# Patient Record
Sex: FEMALE | Race: BLACK OR AFRICAN AMERICAN | NOT HISPANIC OR LATINO | URBAN - METROPOLITAN AREA
[De-identification: names, ages, dates, MRNs, and addresses within clinical notes are randomized per-mention and may not be internally consistent; named-entity substitution may affect disease eponyms.]

---

## 2018-01-01 ENCOUNTER — INPATIENT (INPATIENT)
Facility: HOSPITAL | Age: 73
LOS: 5 days | End: 2018-10-06
Attending: INTERNAL MEDICINE | Admitting: HOSPITALIST
Payer: MEDICARE

## 2018-01-01 VITALS
WEIGHT: 154.1 LBS | HEIGHT: 61 IN | HEART RATE: 134 BPM | TEMPERATURE: 102 F | DIASTOLIC BLOOD PRESSURE: 78 MMHG | OXYGEN SATURATION: 97 % | SYSTOLIC BLOOD PRESSURE: 141 MMHG | RESPIRATION RATE: 17 BRPM

## 2018-01-01 DIAGNOSIS — Z92.3 PERSONAL HISTORY OF IRRADIATION: ICD-10-CM

## 2018-01-01 DIAGNOSIS — R50.9 FEVER, UNSPECIFIED: ICD-10-CM

## 2018-01-01 DIAGNOSIS — Z87.891 PERSONAL HISTORY OF NICOTINE DEPENDENCE: ICD-10-CM

## 2018-01-01 DIAGNOSIS — N17.0 ACUTE KIDNEY FAILURE WITH TUBULAR NECROSIS: ICD-10-CM

## 2018-01-01 DIAGNOSIS — A39.4: ICD-10-CM

## 2018-01-01 DIAGNOSIS — D69.6 THROMBOCYTOPENIA, UNSPECIFIED: ICD-10-CM

## 2018-01-01 DIAGNOSIS — B17.9 ACUTE VIRAL HEPATITIS, UNSPECIFIED: ICD-10-CM

## 2018-01-01 DIAGNOSIS — Z85.3 PERSONAL HISTORY OF MALIGNANT NEOPLASM OF BREAST: ICD-10-CM

## 2018-01-01 DIAGNOSIS — M31.1 THROMBOTIC MICROANGIOPATHY: ICD-10-CM

## 2018-01-01 DIAGNOSIS — I10 ESSENTIAL (PRIMARY) HYPERTENSION: ICD-10-CM

## 2018-01-01 DIAGNOSIS — D72.823 LEUKEMOID REACTION: ICD-10-CM

## 2018-01-01 DIAGNOSIS — E11.9 TYPE 2 DIABETES MELLITUS WITHOUT COMPLICATIONS: ICD-10-CM

## 2018-01-01 DIAGNOSIS — K72.00 ACUTE AND SUBACUTE HEPATIC FAILURE WITHOUT COMA: ICD-10-CM

## 2018-01-01 DIAGNOSIS — A41.9 SEPSIS, UNSPECIFIED ORGANISM: ICD-10-CM

## 2018-01-01 DIAGNOSIS — Z98.890 OTHER SPECIFIED POSTPROCEDURAL STATES: Chronic | ICD-10-CM

## 2018-01-01 DIAGNOSIS — Z92.21 PERSONAL HISTORY OF ANTINEOPLASTIC CHEMOTHERAPY: ICD-10-CM

## 2018-01-01 DIAGNOSIS — D68.9 COAGULATION DEFECT, UNSPECIFIED: ICD-10-CM

## 2018-01-01 LAB
-  CANDIDA ALBICANS: SIGNIFICANT CHANGE UP
-  CANDIDA GLABRATA: SIGNIFICANT CHANGE UP
-  CANDIDA KRUSEI: SIGNIFICANT CHANGE UP
-  CANDIDA PARAPSILOSIS: SIGNIFICANT CHANGE UP
-  CANDIDA TROPICALIS: SIGNIFICANT CHANGE UP
-  CEFTRIAXONE: SIGNIFICANT CHANGE UP
-  COAGULASE NEGATIVE STAPHYLOCOCCUS: SIGNIFICANT CHANGE UP
-  K. PNEUMONIAE GROUP: SIGNIFICANT CHANGE UP
-  KPC RESISTANCE GENE: SIGNIFICANT CHANGE UP
-  PENICILLIN: SIGNIFICANT CHANGE UP
-  STREPTOCOCCUS SP. (NOT GRP A, B OR S PNEUMONIAE): SIGNIFICANT CHANGE UP
A BAUMANNII DNA SPEC QL NAA+PROBE: SIGNIFICANT CHANGE UP
ABO RH CONFIRMATION: SIGNIFICANT CHANGE UP
ADAMTS13 ACTIVITY: 51.9 % — LOW
ALBUMIN SERPL ELPH-MCNC: 1.5 G/DL — LOW (ref 3.3–5)
ALBUMIN SERPL ELPH-MCNC: 1.7 G/DL — LOW (ref 3.3–5)
ALBUMIN SERPL ELPH-MCNC: 2 G/DL — LOW (ref 3.3–5)
ALBUMIN SERPL ELPH-MCNC: 2.1 G/DL — LOW (ref 3.3–5)
ALBUMIN SERPL ELPH-MCNC: 2.2 G/DL — LOW (ref 3.3–5)
ALBUMIN SERPL ELPH-MCNC: 2.3 G/DL — LOW (ref 3.3–5)
ALBUMIN SERPL ELPH-MCNC: 2.3 G/DL — LOW (ref 3.3–5)
ALBUMIN SERPL ELPH-MCNC: 2.4 G/DL — LOW (ref 3.3–5)
ALBUMIN SERPL ELPH-MCNC: 4.1 G/DL — SIGNIFICANT CHANGE UP (ref 3.3–5)
ALP SERPL-CCNC: 120 U/L — SIGNIFICANT CHANGE UP (ref 40–120)
ALP SERPL-CCNC: 129 U/L — HIGH (ref 40–120)
ALP SERPL-CCNC: 132 U/L — HIGH (ref 40–120)
ALP SERPL-CCNC: 134 U/L — HIGH (ref 40–120)
ALP SERPL-CCNC: 241 U/L — HIGH (ref 40–120)
ALP SERPL-CCNC: 403 U/L — HIGH (ref 40–120)
ALP SERPL-CCNC: 459 U/L — HIGH (ref 40–120)
ALP SERPL-CCNC: 469 U/L — HIGH (ref 40–120)
ALP SERPL-CCNC: 58 U/L — SIGNIFICANT CHANGE UP (ref 40–120)
ALP SERPL-CCNC: 64 U/L — SIGNIFICANT CHANGE UP (ref 40–120)
ALP SERPL-CCNC: 67 U/L — SIGNIFICANT CHANGE UP (ref 40–120)
ALP SERPL-CCNC: 74 U/L — SIGNIFICANT CHANGE UP (ref 40–120)
ALP SERPL-CCNC: 74 U/L — SIGNIFICANT CHANGE UP (ref 40–120)
ALP SERPL-CCNC: 77 U/L — SIGNIFICANT CHANGE UP (ref 40–120)
ALT FLD-CCNC: 1109 U/L — HIGH (ref 12–78)
ALT FLD-CCNC: 1139 U/L — HIGH (ref 12–78)
ALT FLD-CCNC: 18 U/L — SIGNIFICANT CHANGE UP (ref 12–78)
ALT FLD-CCNC: 2529 U/L — HIGH (ref 12–78)
ALT FLD-CCNC: 2787 U/L — HIGH (ref 12–78)
ALT FLD-CCNC: 29 U/L — SIGNIFICANT CHANGE UP (ref 12–78)
ALT FLD-CCNC: 471 U/L — HIGH (ref 12–78)
ALT FLD-CCNC: 475 U/L — HIGH (ref 12–78)
ALT FLD-CCNC: 575 U/L — HIGH (ref 12–78)
ALT FLD-CCNC: 65 U/L — SIGNIFICANT CHANGE UP (ref 12–78)
ALT FLD-CCNC: 69 U/L — SIGNIFICANT CHANGE UP (ref 12–78)
ALT FLD-CCNC: 80 U/L — HIGH (ref 12–78)
ALT FLD-CCNC: 80 U/L — HIGH (ref 12–78)
ALT FLD-CCNC: 906 U/L — HIGH (ref 12–78)
AMMONIA BLD-MCNC: 44 UMOL/L — HIGH (ref 11–32)
AMMONIA BLD-MCNC: 77 UMOL/L — HIGH (ref 11–32)
AMPHET UR-MCNC: NEGATIVE — SIGNIFICANT CHANGE UP
AMYLASE P1 CFR SERPL: 231 U/L — HIGH (ref 25–115)
ANION GAP SERPL CALC-SCNC: 12 MMOL/L — SIGNIFICANT CHANGE UP (ref 5–17)
ANION GAP SERPL CALC-SCNC: 15 MMOL/L — SIGNIFICANT CHANGE UP (ref 5–17)
ANION GAP SERPL CALC-SCNC: 17 MMOL/L — SIGNIFICANT CHANGE UP (ref 5–17)
ANION GAP SERPL CALC-SCNC: 18 MMOL/L — HIGH (ref 5–17)
ANION GAP SERPL CALC-SCNC: 20 MMOL/L — HIGH (ref 5–17)
ANION GAP SERPL CALC-SCNC: 21 MMOL/L — HIGH (ref 5–17)
ANION GAP SERPL CALC-SCNC: 23 MMOL/L — HIGH (ref 5–17)
ANION GAP SERPL CALC-SCNC: 25 MMOL/L — HIGH (ref 5–17)
ANION GAP SERPL CALC-SCNC: 27 MMOL/L — HIGH (ref 5–17)
ANION GAP SERPL CALC-SCNC: 29 MMOL/L — HIGH (ref 5–17)
ANISOCYTOSIS BLD QL: SLIGHT — SIGNIFICANT CHANGE UP
APAP SERPL-MCNC: 10 UG/ML — SIGNIFICANT CHANGE UP (ref 10–30)
APPEARANCE UR: CLEAR — SIGNIFICANT CHANGE UP
APTT BLD: 187.2 SEC — CRITICAL HIGH (ref 27.5–37.4)
APTT BLD: 23 SEC — LOW (ref 27.5–37.4)
APTT BLD: 24.9 SEC — LOW (ref 27.5–37.4)
APTT BLD: 37.4 SEC — SIGNIFICANT CHANGE UP (ref 27.5–37.4)
APTT BLD: 38.1 SEC — HIGH (ref 27.5–37.4)
APTT BLD: 40.3 SEC — HIGH (ref 27.5–37.4)
APTT BLD: 40.7 SEC — HIGH (ref 27.5–37.4)
APTT BLD: 44.1 SEC — HIGH (ref 27.5–37.4)
AST SERPL-CCNC: 1107 U/L — HIGH (ref 15–37)
AST SERPL-CCNC: 114 U/L — HIGH (ref 15–37)
AST SERPL-CCNC: 17 U/L — SIGNIFICANT CHANGE UP (ref 15–37)
AST SERPL-CCNC: 2244 U/L — HIGH (ref 15–37)
AST SERPL-CCNC: 2910 U/L — HIGH (ref 15–37)
AST SERPL-CCNC: 5685 U/L — HIGH (ref 15–37)
AST SERPL-CCNC: 62 U/L — HIGH (ref 15–37)
AST SERPL-CCNC: 802 U/L — HIGH (ref 15–37)
AST SERPL-CCNC: 809 U/L — HIGH (ref 15–37)
AST SERPL-CCNC: 8267 U/L — HIGH (ref 15–37)
AST SERPL-CCNC: 85 U/L — HIGH (ref 15–37)
AST SERPL-CCNC: 85 U/L — HIGH (ref 15–37)
AST SERPL-CCNC: 88 U/L — HIGH (ref 15–37)
AST SERPL-CCNC: 8823 U/L — HIGH (ref 15–37)
BACTERIA # UR AUTO: ABNORMAL
BARBITURATES UR SCN-MCNC: NEGATIVE — SIGNIFICANT CHANGE UP
BASE EXCESS BLDA CALC-SCNC: -10.1 MMOL/L — LOW (ref -2–2)
BASE EXCESS BLDA CALC-SCNC: -10.5 MMOL/L — LOW (ref -2–2)
BASE EXCESS BLDA CALC-SCNC: -10.8 MMOL/L — LOW (ref -2–2)
BASE EXCESS BLDA CALC-SCNC: -11 MMOL/L — LOW (ref -2–2)
BASE EXCESS BLDA CALC-SCNC: -11.1 MMOL/L — LOW (ref -2–2)
BASE EXCESS BLDA CALC-SCNC: -13.4 MMOL/L — LOW (ref -2–2)
BASE EXCESS BLDA CALC-SCNC: -16.5 MMOL/L — LOW (ref -2–2)
BASE EXCESS BLDA CALC-SCNC: -17 MMOL/L — LOW (ref -2–2)
BASE EXCESS BLDA CALC-SCNC: -18 MMOL/L — LOW (ref -2–2)
BASE EXCESS BLDA CALC-SCNC: -21 MMOL/L — LOW (ref -2–2)
BASE EXCESS BLDA CALC-SCNC: -9.4 MMOL/L — LOW (ref -2–2)
BASE EXCESS BLDA CALC-SCNC: -9.6 MMOL/L — LOW (ref -2–2)
BASE EXCESS BLDV CALC-SCNC: -11.7 MMOL/L — LOW (ref -2–2)
BASE EXCESS BLDV CALC-SCNC: -9.6 MMOL/L — LOW (ref -2–2)
BASOPHILS # BLD AUTO: 0 K/UL — SIGNIFICANT CHANGE UP (ref 0–0.2)
BASOPHILS # BLD AUTO: 0.01 K/UL — SIGNIFICANT CHANGE UP (ref 0–0.2)
BASOPHILS # BLD AUTO: 0.11 K/UL — SIGNIFICANT CHANGE UP (ref 0–0.2)
BASOPHILS NFR BLD AUTO: 0 % — SIGNIFICANT CHANGE UP (ref 0–2)
BASOPHILS NFR BLD AUTO: 0.1 % — SIGNIFICANT CHANGE UP (ref 0–2)
BASOPHILS NFR BLD AUTO: 0.4 % — SIGNIFICANT CHANGE UP (ref 0–2)
BENZODIAZ UR-MCNC: NEGATIVE — SIGNIFICANT CHANGE UP
BILIRUB DIRECT SERPL-MCNC: 0.59 MG/DL — HIGH (ref 0.05–0.2)
BILIRUB DIRECT SERPL-MCNC: 0.77 MG/DL — HIGH (ref 0.05–0.2)
BILIRUB DIRECT SERPL-MCNC: 1.31 MG/DL — HIGH (ref 0.05–0.2)
BILIRUB DIRECT SERPL-MCNC: 1.35 MG/DL — HIGH (ref 0.05–0.2)
BILIRUB DIRECT SERPL-MCNC: 1.36 MG/DL — HIGH (ref 0.05–0.2)
BILIRUB DIRECT SERPL-MCNC: 5.49 MG/DL — HIGH (ref 0.05–0.2)
BILIRUB DIRECT SERPL-MCNC: 5.56 MG/DL — HIGH (ref 0.05–0.2)
BILIRUB DIRECT SERPL-MCNC: 6.61 MG/DL — HIGH (ref 0.05–0.2)
BILIRUB INDIRECT FLD-MCNC: 0.1 MG/DL — LOW (ref 0.2–1)
BILIRUB INDIRECT FLD-MCNC: 0.2 MG/DL — SIGNIFICANT CHANGE UP (ref 0.2–1)
BILIRUB INDIRECT FLD-MCNC: 0.3 MG/DL — SIGNIFICANT CHANGE UP (ref 0.2–1)
BILIRUB INDIRECT FLD-MCNC: 0.4 MG/DL — SIGNIFICANT CHANGE UP (ref 0.2–1)
BILIRUB INDIRECT FLD-MCNC: 1 MG/DL — SIGNIFICANT CHANGE UP (ref 0.2–1)
BILIRUB INDIRECT FLD-MCNC: 1.3 MG/DL — HIGH (ref 0.2–1)
BILIRUB SERPL-MCNC: 0.5 MG/DL — SIGNIFICANT CHANGE UP (ref 0.2–1.2)
BILIRUB SERPL-MCNC: 0.5 MG/DL — SIGNIFICANT CHANGE UP (ref 0.2–1.2)
BILIRUB SERPL-MCNC: 0.8 MG/DL — SIGNIFICANT CHANGE UP (ref 0.2–1.2)
BILIRUB SERPL-MCNC: 1.1 MG/DL — SIGNIFICANT CHANGE UP (ref 0.2–1.2)
BILIRUB SERPL-MCNC: 1.1 MG/DL — SIGNIFICANT CHANGE UP (ref 0.2–1.2)
BILIRUB SERPL-MCNC: 1.2 MG/DL — SIGNIFICANT CHANGE UP (ref 0.2–1.2)
BILIRUB SERPL-MCNC: 1.6 MG/DL — HIGH (ref 0.2–1.2)
BILIRUB SERPL-MCNC: 1.7 MG/DL — HIGH (ref 0.2–1.2)
BILIRUB SERPL-MCNC: 1.7 MG/DL — HIGH (ref 0.2–1.2)
BILIRUB SERPL-MCNC: 2.2 MG/DL — HIGH (ref 0.2–1.2)
BILIRUB SERPL-MCNC: 4.3 MG/DL — HIGH (ref 0.2–1.2)
BILIRUB SERPL-MCNC: 5.7 MG/DL — HIGH (ref 0.2–1.2)
BILIRUB SERPL-MCNC: 6.8 MG/DL — HIGH (ref 0.2–1.2)
BILIRUB SERPL-MCNC: 7.6 MG/DL — HIGH (ref 0.2–1.2)
BILIRUB UR-MCNC: NEGATIVE — SIGNIFICANT CHANGE UP
BLD GP AB SCN SERPL QL: SIGNIFICANT CHANGE UP
BLD GP AB SCN SERPL QL: SIGNIFICANT CHANGE UP
BLOOD GAS COMMENTS, VENOUS: SIGNIFICANT CHANGE UP
BLOOD GAS COMMENTS: SIGNIFICANT CHANGE UP
BLOOD GAS SOURCE: SIGNIFICANT CHANGE UP
BUN SERPL-MCNC: 11 MG/DL — SIGNIFICANT CHANGE UP (ref 7–23)
BUN SERPL-MCNC: 14 MG/DL — SIGNIFICANT CHANGE UP (ref 7–23)
BUN SERPL-MCNC: 14 MG/DL — SIGNIFICANT CHANGE UP (ref 7–23)
BUN SERPL-MCNC: 20 MG/DL — SIGNIFICANT CHANGE UP (ref 7–23)
BUN SERPL-MCNC: 22 MG/DL — SIGNIFICANT CHANGE UP (ref 7–23)
BUN SERPL-MCNC: 22 MG/DL — SIGNIFICANT CHANGE UP (ref 7–23)
BUN SERPL-MCNC: 23 MG/DL — SIGNIFICANT CHANGE UP (ref 7–23)
BUN SERPL-MCNC: 24 MG/DL — HIGH (ref 7–23)
BUN SERPL-MCNC: 30 MG/DL — HIGH (ref 7–23)
BUN SERPL-MCNC: 35 MG/DL — HIGH (ref 7–23)
BUN SERPL-MCNC: 38 MG/DL — HIGH (ref 7–23)
BUN SERPL-MCNC: 38 MG/DL — HIGH (ref 7–23)
BUN SERPL-MCNC: 39 MG/DL — HIGH (ref 7–23)
BUN SERPL-MCNC: 42 MG/DL — HIGH (ref 7–23)
C3 SERPL-MCNC: 80 MG/DL — LOW (ref 81–157)
C4 SERPL-MCNC: 17 MG/DL — SIGNIFICANT CHANGE UP (ref 13–39)
CA-I BLD-SCNC: 1.12 MMOL/L — SIGNIFICANT CHANGE UP (ref 1.12–1.3)
CALCIUM SERPL-MCNC: 6.3 MG/DL — CRITICAL LOW (ref 8.5–10.1)
CALCIUM SERPL-MCNC: 6.5 MG/DL — CRITICAL LOW (ref 8.5–10.1)
CALCIUM SERPL-MCNC: 6.5 MG/DL — CRITICAL LOW (ref 8.5–10.1)
CALCIUM SERPL-MCNC: 6.6 MG/DL — LOW (ref 8.5–10.1)
CALCIUM SERPL-MCNC: 6.7 MG/DL — LOW (ref 8.5–10.1)
CALCIUM SERPL-MCNC: 6.8 MG/DL — LOW (ref 8.5–10.1)
CALCIUM SERPL-MCNC: 6.8 MG/DL — LOW (ref 8.5–10.1)
CALCIUM SERPL-MCNC: 6.9 MG/DL — LOW (ref 8.5–10.1)
CALCIUM SERPL-MCNC: 7 MG/DL — LOW (ref 8.5–10.1)
CALCIUM SERPL-MCNC: 7 MG/DL — LOW (ref 8.5–10.1)
CALCIUM SERPL-MCNC: 7.6 MG/DL — LOW (ref 8.5–10.1)
CALCIUM SERPL-MCNC: 7.6 MG/DL — LOW (ref 8.5–10.1)
CALCIUM SERPL-MCNC: 8.1 MG/DL — LOW (ref 8.5–10.1)
CALCIUM SERPL-MCNC: 8.9 MG/DL — SIGNIFICANT CHANGE UP (ref 8.5–10.1)
CHLORIDE SERPL-SCNC: 100 MMOL/L — SIGNIFICANT CHANGE UP (ref 96–108)
CHLORIDE SERPL-SCNC: 102 MMOL/L — SIGNIFICANT CHANGE UP (ref 96–108)
CHLORIDE SERPL-SCNC: 104 MMOL/L — SIGNIFICANT CHANGE UP (ref 96–108)
CHLORIDE SERPL-SCNC: 105 MMOL/L — SIGNIFICANT CHANGE UP (ref 96–108)
CHLORIDE SERPL-SCNC: 108 MMOL/L — SIGNIFICANT CHANGE UP (ref 96–108)
CHLORIDE SERPL-SCNC: 109 MMOL/L — HIGH (ref 96–108)
CHLORIDE SERPL-SCNC: 110 MMOL/L — HIGH (ref 96–108)
CHLORIDE SERPL-SCNC: 111 MMOL/L — HIGH (ref 96–108)
CHLORIDE SERPL-SCNC: 111 MMOL/L — HIGH (ref 96–108)
CHLORIDE SERPL-SCNC: 112 MMOL/L — HIGH (ref 96–108)
CHLORIDE SERPL-SCNC: 113 MMOL/L — HIGH (ref 96–108)
CHLORIDE SERPL-SCNC: 113 MMOL/L — HIGH (ref 96–108)
CHLORIDE SERPL-SCNC: 116 MMOL/L — HIGH (ref 96–108)
CHLORIDE SERPL-SCNC: 93 MMOL/L — LOW (ref 96–108)
CK MB BLD-MCNC: 0.5 % — SIGNIFICANT CHANGE UP (ref 0–3.5)
CK MB BLD-MCNC: 1 % — SIGNIFICANT CHANGE UP (ref 0–3.5)
CK MB BLD-MCNC: 2 % — SIGNIFICANT CHANGE UP (ref 0–3.5)
CK MB BLD-MCNC: 2.8 % — SIGNIFICANT CHANGE UP (ref 0–3.5)
CK MB BLD-MCNC: <1.1 % — SIGNIFICANT CHANGE UP (ref 0–3.5)
CK MB CFR SERPL CALC: 1.5 NG/ML — SIGNIFICANT CHANGE UP (ref 0.5–3.6)
CK MB CFR SERPL CALC: 26.8 NG/ML — HIGH (ref 0.5–3.6)
CK MB CFR SERPL CALC: 4.8 NG/ML — HIGH (ref 0.5–3.6)
CK MB CFR SERPL CALC: 53.3 NG/ML — HIGH (ref 0.5–3.6)
CK MB CFR SERPL CALC: <1 NG/ML — SIGNIFICANT CHANGE UP (ref 0.5–3.6)
CK SERPL-CCNC: 149 U/L — SIGNIFICANT CHANGE UP (ref 26–192)
CK SERPL-CCNC: 1831 U/L — HIGH (ref 26–192)
CK SERPL-CCNC: 245 U/L — HIGH (ref 26–192)
CK SERPL-CCNC: 479 U/L — HIGH (ref 26–192)
CK SERPL-CCNC: 90 U/L — SIGNIFICANT CHANGE UP (ref 26–192)
CK SERPL-CCNC: 953 U/L — HIGH (ref 26–192)
CK SERPL-CCNC: CRITICAL HIGH U/L (ref 26–192)
CO2 SERPL-SCNC: 10 MMOL/L — CRITICAL LOW (ref 22–31)
CO2 SERPL-SCNC: 11 MMOL/L — LOW (ref 22–31)
CO2 SERPL-SCNC: 14 MMOL/L — LOW (ref 22–31)
CO2 SERPL-SCNC: 14 MMOL/L — LOW (ref 22–31)
CO2 SERPL-SCNC: 15 MMOL/L — LOW (ref 22–31)
CO2 SERPL-SCNC: 16 MMOL/L — LOW (ref 22–31)
CO2 SERPL-SCNC: 17 MMOL/L — LOW (ref 22–31)
CO2 SERPL-SCNC: 18 MMOL/L — LOW (ref 22–31)
CO2 SERPL-SCNC: 25 MMOL/L — SIGNIFICANT CHANGE UP (ref 22–31)
CO2 SERPL-SCNC: 8 MMOL/L — CRITICAL LOW (ref 22–31)
COCAINE METAB.OTHER UR-MCNC: NEGATIVE — SIGNIFICANT CHANGE UP
COLOR SPEC: YELLOW — SIGNIFICANT CHANGE UP
COMMENT - URINE: SIGNIFICANT CHANGE UP
CREAT ?TM UR-MCNC: 78 MG/DL — SIGNIFICANT CHANGE UP
CREAT SERPL-MCNC: 1 MG/DL — SIGNIFICANT CHANGE UP (ref 0.5–1.3)
CREAT SERPL-MCNC: 1.31 MG/DL — HIGH (ref 0.5–1.3)
CREAT SERPL-MCNC: 1.54 MG/DL — HIGH (ref 0.5–1.3)
CREAT SERPL-MCNC: 1.68 MG/DL — HIGH (ref 0.5–1.3)
CREAT SERPL-MCNC: 1.76 MG/DL — HIGH (ref 0.5–1.3)
CREAT SERPL-MCNC: 1.88 MG/DL — HIGH (ref 0.5–1.3)
CREAT SERPL-MCNC: 2.02 MG/DL — HIGH (ref 0.5–1.3)
CREAT SERPL-MCNC: 2.19 MG/DL — HIGH (ref 0.5–1.3)
CREAT SERPL-MCNC: 2.36 MG/DL — HIGH (ref 0.5–1.3)
CREAT SERPL-MCNC: 3.73 MG/DL — HIGH (ref 0.5–1.3)
CREAT SERPL-MCNC: 3.73 MG/DL — HIGH (ref 0.5–1.3)
CREAT SERPL-MCNC: 3.82 MG/DL — HIGH (ref 0.5–1.3)
CREAT SERPL-MCNC: 4.06 MG/DL — HIGH (ref 0.5–1.3)
CREAT SERPL-MCNC: 4.78 MG/DL — HIGH (ref 0.5–1.3)
CRP SERPL-MCNC: 31.55 MG/DL — HIGH (ref 0–0.4)
CRP SERPL-MCNC: 35.47 MG/DL — HIGH (ref 0–0.4)
CULTURE RESULTS: NO GROWTH — SIGNIFICANT CHANGE UP
CULTURE RESULTS: SIGNIFICANT CHANGE UP
D DIMER BLD IA.RAPID-MCNC: 7543 NG/ML DDU — HIGH
DIFF PNL FLD: ABNORMAL
E CLOAC COMP DNA BLD POS QL NAA+PROBE: SIGNIFICANT CHANGE UP
E COLI DNA BLD POS QL NAA+NON-PROBE: SIGNIFICANT CHANGE UP
ELLIPTOCYTES BLD QL SMEAR: SLIGHT — SIGNIFICANT CHANGE UP
ENTEROCOC DNA BLD POS QL NAA+NON-PROBE: SIGNIFICANT CHANGE UP
ENTEROCOC DNA BLD POS QL NAA+NON-PROBE: SIGNIFICANT CHANGE UP
EOSINOPHIL # BLD AUTO: 0 K/UL — SIGNIFICANT CHANGE UP (ref 0–0.5)
EOSINOPHIL # BLD AUTO: 0.01 K/UL — SIGNIFICANT CHANGE UP (ref 0–0.5)
EOSINOPHIL # BLD AUTO: 0.01 K/UL — SIGNIFICANT CHANGE UP (ref 0–0.5)
EOSINOPHIL NFR BLD AUTO: 0 % — SIGNIFICANT CHANGE UP (ref 0–6)
EOSINOPHIL NFR BLD AUTO: 0.1 % — SIGNIFICANT CHANGE UP (ref 0–6)
EPI CELLS # UR: SIGNIFICANT CHANGE UP
ERYTHROCYTE [SEDIMENTATION RATE] IN BLOOD: 23 MM/HR — HIGH (ref 0–20)
ERYTHROCYTE [SEDIMENTATION RATE] IN BLOOD: 9 MM/HR — SIGNIFICANT CHANGE UP (ref 0–20)
ETHANOL SERPL-MCNC: <10 MG/DL — SIGNIFICANT CHANGE UP (ref 0–10)
FERRITIN SERPL-MCNC: 2539 NG/ML — HIGH (ref 15–150)
FERRITIN SERPL-MCNC: 285 NG/ML — HIGH (ref 15–150)
FERRITIN SERPL-MCNC: 3478 NG/ML — HIGH (ref 15–150)
FIBRINOGEN PPP-MCNC: 255 MG/DL — LOW (ref 310–510)
FIBRINOGEN PPP-MCNC: 294 MG/DL — LOW (ref 310–510)
FIBRINOGEN PPP-MCNC: 294 MG/DL — LOW (ref 310–510)
FIBRINOGEN PPP-MCNC: 524 MG/DL — HIGH (ref 310–510)
FIBRINOGEN PPP-MCNC: 691 MG/DL — HIGH (ref 310–510)
FLUAV SPEC QL CULT: NEGATIVE — SIGNIFICANT CHANGE UP
FLUAV SPEC QL CULT: NEGATIVE — SIGNIFICANT CHANGE UP
FLUBV AG SPEC QL IA: NEGATIVE — SIGNIFICANT CHANGE UP
FLUBV AG SPEC QL IA: NEGATIVE — SIGNIFICANT CHANGE UP
GAS PNL BLDV: SIGNIFICANT CHANGE UP
GIANT PLATELETS BLD QL SMEAR: PRESENT — SIGNIFICANT CHANGE UP
GLUCOSE BLDC GLUCOMTR-MCNC: 106 MG/DL — HIGH (ref 70–99)
GLUCOSE BLDC GLUCOMTR-MCNC: 144 MG/DL — HIGH (ref 70–99)
GLUCOSE BLDC GLUCOMTR-MCNC: 152 MG/DL — HIGH (ref 70–99)
GLUCOSE BLDC GLUCOMTR-MCNC: 166 MG/DL — HIGH (ref 70–99)
GLUCOSE BLDC GLUCOMTR-MCNC: 168 MG/DL — HIGH (ref 70–99)
GLUCOSE BLDC GLUCOMTR-MCNC: 170 MG/DL — HIGH (ref 70–99)
GLUCOSE BLDC GLUCOMTR-MCNC: 180 MG/DL — HIGH (ref 70–99)
GLUCOSE BLDC GLUCOMTR-MCNC: 181 MG/DL — HIGH (ref 70–99)
GLUCOSE BLDC GLUCOMTR-MCNC: 188 MG/DL — HIGH (ref 70–99)
GLUCOSE BLDC GLUCOMTR-MCNC: 190 MG/DL — HIGH (ref 70–99)
GLUCOSE BLDC GLUCOMTR-MCNC: 207 MG/DL — HIGH (ref 70–99)
GLUCOSE BLDC GLUCOMTR-MCNC: 246 MG/DL — HIGH (ref 70–99)
GLUCOSE BLDC GLUCOMTR-MCNC: 252 MG/DL — HIGH (ref 70–99)
GLUCOSE BLDC GLUCOMTR-MCNC: 262 MG/DL — HIGH (ref 70–99)
GLUCOSE BLDC GLUCOMTR-MCNC: 269 MG/DL — HIGH (ref 70–99)
GLUCOSE BLDC GLUCOMTR-MCNC: 274 MG/DL — HIGH (ref 70–99)
GLUCOSE BLDC GLUCOMTR-MCNC: 278 MG/DL — HIGH (ref 70–99)
GLUCOSE BLDC GLUCOMTR-MCNC: 28 MG/DL — CRITICAL LOW (ref 70–99)
GLUCOSE BLDC GLUCOMTR-MCNC: 288 MG/DL — HIGH (ref 70–99)
GLUCOSE BLDC GLUCOMTR-MCNC: 290 MG/DL — HIGH (ref 70–99)
GLUCOSE BLDC GLUCOMTR-MCNC: 297 MG/DL — HIGH (ref 70–99)
GLUCOSE BLDC GLUCOMTR-MCNC: 329 MG/DL — HIGH (ref 70–99)
GLUCOSE BLDC GLUCOMTR-MCNC: 334 MG/DL — HIGH (ref 70–99)
GLUCOSE BLDC GLUCOMTR-MCNC: 423 MG/DL — HIGH (ref 70–99)
GLUCOSE BLDC GLUCOMTR-MCNC: 53 MG/DL — LOW (ref 70–99)
GLUCOSE BLDC GLUCOMTR-MCNC: 57 MG/DL — LOW (ref 70–99)
GLUCOSE SERPL-MCNC: 134 MG/DL — HIGH (ref 70–99)
GLUCOSE SERPL-MCNC: 150 MG/DL — HIGH (ref 70–99)
GLUCOSE SERPL-MCNC: 151 MG/DL — HIGH (ref 70–99)
GLUCOSE SERPL-MCNC: 172 MG/DL — HIGH (ref 70–99)
GLUCOSE SERPL-MCNC: 186 MG/DL — HIGH (ref 70–99)
GLUCOSE SERPL-MCNC: 223 MG/DL — HIGH (ref 70–99)
GLUCOSE SERPL-MCNC: 223 MG/DL — HIGH (ref 70–99)
GLUCOSE SERPL-MCNC: 226 MG/DL — HIGH (ref 70–99)
GLUCOSE SERPL-MCNC: 229 MG/DL — HIGH (ref 70–99)
GLUCOSE SERPL-MCNC: 247 MG/DL — HIGH (ref 70–99)
GLUCOSE SERPL-MCNC: 252 MG/DL — HIGH (ref 70–99)
GLUCOSE SERPL-MCNC: 284 MG/DL — HIGH (ref 70–99)
GLUCOSE SERPL-MCNC: 314 MG/DL — HIGH (ref 70–99)
GLUCOSE SERPL-MCNC: 691 MG/DL — CRITICAL HIGH (ref 70–99)
GLUCOSE UR QL: NEGATIVE MG/DL — SIGNIFICANT CHANGE UP
GP B STREP DNA BLD POS QL NAA+NON-PROBE: SIGNIFICANT CHANGE UP
GRAM STN FLD: SIGNIFICANT CHANGE UP
GRAN CASTS # UR COMP ASSIST: ABNORMAL /LPF
HAEM INFLU DNA BLD POS QL NAA+NON-PROBE: SIGNIFICANT CHANGE UP
HAPTOGLOB SERPL-MCNC: 95 MG/DL — SIGNIFICANT CHANGE UP (ref 34–200)
HAV IGM SER-ACNC: SIGNIFICANT CHANGE UP
HAV IGM SER-ACNC: SIGNIFICANT CHANGE UP
HBA1C BLD-MCNC: 6.9 % — HIGH (ref 4–5.6)
HBV CORE IGM SER-ACNC: SIGNIFICANT CHANGE UP
HBV CORE IGM SER-ACNC: SIGNIFICANT CHANGE UP
HBV SURFACE AB SER-ACNC: 13.1 MIU/ML — SIGNIFICANT CHANGE UP
HBV SURFACE AB SER-ACNC: REACTIVE
HBV SURFACE AG SER-ACNC: SIGNIFICANT CHANGE UP
HCO3 BLDA-SCNC: 10 MMOL/L — LOW (ref 21–29)
HCO3 BLDA-SCNC: 11 MMOL/L — LOW (ref 21–29)
HCO3 BLDA-SCNC: 14 MMOL/L — LOW (ref 21–29)
HCO3 BLDA-SCNC: 14 MMOL/L — LOW (ref 21–29)
HCO3 BLDA-SCNC: 15 MMOL/L — LOW (ref 21–29)
HCO3 BLDA-SCNC: 16 MMOL/L — LOW (ref 21–29)
HCO3 BLDA-SCNC: 17 MMOL/L — LOW (ref 21–29)
HCO3 BLDA-SCNC: 8 MMOL/L — LOW (ref 21–29)
HCO3 BLDV-SCNC: 16 MMOL/L — LOW (ref 21–29)
HCO3 BLDV-SCNC: 16 MMOL/L — LOW (ref 21–29)
HCT VFR BLD CALC: 15.9 % — CRITICAL LOW (ref 34.5–45)
HCT VFR BLD CALC: 21.3 % — LOW (ref 34.5–45)
HCT VFR BLD CALC: 23.6 % — LOW (ref 34.5–45)
HCT VFR BLD CALC: 29.2 % — LOW (ref 34.5–45)
HCT VFR BLD CALC: 29.4 % — LOW (ref 34.5–45)
HCT VFR BLD CALC: 30.5 % — LOW (ref 34.5–45)
HCT VFR BLD CALC: 32.3 % — LOW (ref 34.5–45)
HCT VFR BLD CALC: 33 % — LOW (ref 34.5–45)
HCT VFR BLD CALC: 33.3 % — LOW (ref 34.5–45)
HCT VFR BLD CALC: 37.8 % — SIGNIFICANT CHANGE UP (ref 34.5–45)
HCT VFR BLD CALC: 40.9 % — SIGNIFICANT CHANGE UP (ref 34.5–45)
HCV AB S/CO SERPL IA: 0.08 S/CO — SIGNIFICANT CHANGE UP
HCV AB S/CO SERPL IA: 0.08 S/CO — SIGNIFICANT CHANGE UP
HCV AB S/CO SERPL IA: 0.09 S/CO — SIGNIFICANT CHANGE UP
HCV AB SERPL-IMP: SIGNIFICANT CHANGE UP
HGB BLD-MCNC: 10 G/DL — LOW (ref 11.5–15.5)
HGB BLD-MCNC: 10.7 G/DL — LOW (ref 11.5–15.5)
HGB BLD-MCNC: 10.8 G/DL — LOW (ref 11.5–15.5)
HGB BLD-MCNC: 11.3 G/DL — LOW (ref 11.5–15.5)
HGB BLD-MCNC: 12.8 G/DL — SIGNIFICANT CHANGE UP (ref 11.5–15.5)
HGB BLD-MCNC: 4.8 G/DL — CRITICAL LOW (ref 11.5–15.5)
HGB BLD-MCNC: 6.6 G/DL — CRITICAL LOW (ref 11.5–15.5)
HGB BLD-MCNC: 7.5 G/DL — LOW (ref 11.5–15.5)
HGB BLD-MCNC: 8.6 G/DL — LOW (ref 11.5–15.5)
HGB BLD-MCNC: 8.9 G/DL — LOW (ref 11.5–15.5)
HGB BLD-MCNC: 9.6 G/DL — LOW (ref 11.5–15.5)
HOROWITZ INDEX BLDA+IHG-RTO: 0.35 — SIGNIFICANT CHANGE UP
HOROWITZ INDEX BLDA+IHG-RTO: 0.5 — SIGNIFICANT CHANGE UP
HOROWITZ INDEX BLDA+IHG-RTO: 21 — SIGNIFICANT CHANGE UP
HOROWITZ INDEX BLDA+IHG-RTO: 30 — SIGNIFICANT CHANGE UP
HOROWITZ INDEX BLDA+IHG-RTO: 30 — SIGNIFICANT CHANGE UP
HOROWITZ INDEX BLDA+IHG-RTO: 35 — SIGNIFICANT CHANGE UP
HOROWITZ INDEX BLDA+IHG-RTO: 40 — SIGNIFICANT CHANGE UP
HOROWITZ INDEX BLDA+IHG-RTO: 40 — SIGNIFICANT CHANGE UP
HOROWITZ INDEX BLDA+IHG-RTO: 45 — SIGNIFICANT CHANGE UP
HOROWITZ INDEX BLDV+IHG-RTO: 100 — SIGNIFICANT CHANGE UP
HOROWITZ INDEX BLDV+IHG-RTO: 28 — SIGNIFICANT CHANGE UP
HOROWITZ INDEX BLDV+IHG-RTO: 28 — SIGNIFICANT CHANGE UP
IMM GRANULOCYTES NFR BLD AUTO: 0.2 % — SIGNIFICANT CHANGE UP (ref 0–1.5)
IMM GRANULOCYTES NFR BLD AUTO: 1.1 % — SIGNIFICANT CHANGE UP (ref 0–1.5)
IMM GRANULOCYTES NFR BLD AUTO: 8.8 % — HIGH (ref 0–1.5)
INR BLD: 1.03 RATIO — SIGNIFICANT CHANGE UP (ref 0.88–1.16)
INR BLD: 1.55 RATIO — HIGH (ref 0.88–1.16)
INR BLD: 1.7 RATIO — HIGH (ref 0.88–1.16)
INR BLD: 1.8 RATIO — HIGH (ref 0.88–1.16)
INR BLD: 2.08 RATIO — HIGH (ref 0.88–1.16)
INR BLD: 2.33 RATIO — HIGH (ref 0.88–1.16)
INR BLD: 2.48 RATIO — HIGH (ref 0.88–1.16)
INR BLD: 3.22 RATIO — HIGH (ref 0.88–1.16)
INR BLD: 3.47 RATIO — HIGH (ref 0.88–1.16)
K OXYTOCA DNA BLD POS QL NAA+NON-PROBE: SIGNIFICANT CHANGE UP
KETONES UR-MCNC: NEGATIVE — SIGNIFICANT CHANGE UP
L MONOCYTOG DNA BLD POS QL NAA+NON-PROBE: SIGNIFICANT CHANGE UP
LACTATE SERPL-SCNC: 10.3 MMOL/L — CRITICAL HIGH (ref 0.7–2)
LACTATE SERPL-SCNC: 10.4 MMOL/L — CRITICAL HIGH (ref 0.7–2)
LACTATE SERPL-SCNC: 13.5 MMOL/L — CRITICAL HIGH (ref 0.7–2)
LACTATE SERPL-SCNC: 14.3 MMOL/L — CRITICAL HIGH (ref 0.7–2)
LACTATE SERPL-SCNC: 24.7 MMOL/L — CRITICAL HIGH (ref 0.7–2)
LACTATE SERPL-SCNC: 3 MMOL/L — HIGH (ref 0.7–2)
LACTATE SERPL-SCNC: 3 MMOL/L — HIGH (ref 0.7–2)
LACTATE SERPL-SCNC: 6.8 MMOL/L — CRITICAL HIGH (ref 0.7–2)
LACTATE SERPL-SCNC: 7.3 MMOL/L — CRITICAL HIGH (ref 0.7–2)
LACTATE SERPL-SCNC: 7.5 MMOL/L — CRITICAL HIGH (ref 0.7–2)
LACTATE SERPL-SCNC: 7.5 MMOL/L — CRITICAL HIGH (ref 0.7–2)
LACTATE SERPL-SCNC: 8.1 MMOL/L — CRITICAL HIGH (ref 0.7–2)
LACTATE SERPL-SCNC: 8.2 MMOL/L — CRITICAL HIGH (ref 0.7–2)
LACTATE SERPL-SCNC: 8.4 MMOL/L — CRITICAL HIGH (ref 0.7–2)
LACTATE SERPL-SCNC: 8.9 MMOL/L — CRITICAL HIGH (ref 0.7–2)
LACTATE SERPL-SCNC: 9.8 MMOL/L — CRITICAL HIGH (ref 0.7–2)
LDH SERPL L TO P-CCNC: 1307 U/L — HIGH (ref 50–242)
LEUKOCYTE ESTERASE UR-ACNC: NEGATIVE — SIGNIFICANT CHANGE UP
LG PLATELETS BLD QL AUTO: SLIGHT — SIGNIFICANT CHANGE UP
LIDOCAIN IGE QN: 49 U/L — LOW (ref 73–393)
LYMPHOCYTES # BLD AUTO: 0.22 K/UL — LOW (ref 1–3.3)
LYMPHOCYTES # BLD AUTO: 0.67 K/UL — LOW (ref 1–3.3)
LYMPHOCYTES # BLD AUTO: 0.87 K/UL — LOW (ref 1–3.3)
LYMPHOCYTES # BLD AUTO: 1 % — LOW (ref 13–44)
LYMPHOCYTES # BLD AUTO: 1.13 K/UL — SIGNIFICANT CHANGE UP (ref 1–3.3)
LYMPHOCYTES # BLD AUTO: 1.78 K/UL — SIGNIFICANT CHANGE UP (ref 1–3.3)
LYMPHOCYTES # BLD AUTO: 14 % — SIGNIFICANT CHANGE UP (ref 13–44)
LYMPHOCYTES # BLD AUTO: 20 % — SIGNIFICANT CHANGE UP (ref 13–44)
LYMPHOCYTES # BLD AUTO: 4.5 % — LOW (ref 13–44)
LYMPHOCYTES # BLD AUTO: 6.8 % — LOW (ref 13–44)
MACROCYTES BLD QL: SLIGHT — SIGNIFICANT CHANGE UP
MAGNESIUM SERPL-MCNC: 0.9 MG/DL — CRITICAL LOW (ref 1.6–2.6)
MAGNESIUM SERPL-MCNC: 1.2 MG/DL — LOW (ref 1.6–2.6)
MAGNESIUM SERPL-MCNC: 1.3 MG/DL — LOW (ref 1.6–2.6)
MAGNESIUM SERPL-MCNC: 1.6 MG/DL — SIGNIFICANT CHANGE UP (ref 1.6–2.6)
MAGNESIUM SERPL-MCNC: 1.7 MG/DL — SIGNIFICANT CHANGE UP (ref 1.6–2.6)
MAGNESIUM SERPL-MCNC: 1.9 MG/DL — SIGNIFICANT CHANGE UP (ref 1.6–2.6)
MAGNESIUM SERPL-MCNC: 2 MG/DL — SIGNIFICANT CHANGE UP (ref 1.6–2.6)
MAGNESIUM SERPL-MCNC: 2.2 MG/DL — SIGNIFICANT CHANGE UP (ref 1.6–2.6)
MAGNESIUM SERPL-MCNC: 2.4 MG/DL — SIGNIFICANT CHANGE UP (ref 1.6–2.6)
MAGNESIUM SERPL-MCNC: 2.9 MG/DL — HIGH (ref 1.6–2.6)
MANUAL SMEAR VERIFICATION: SIGNIFICANT CHANGE UP
MANUAL SMEAR VERIFICATION: SIGNIFICANT CHANGE UP
MCHC RBC-ENTMCNC: 26.8 PG — LOW (ref 27–34)
MCHC RBC-ENTMCNC: 27.2 PG — SIGNIFICANT CHANGE UP (ref 27–34)
MCHC RBC-ENTMCNC: 27.3 PG — SIGNIFICANT CHANGE UP (ref 27–34)
MCHC RBC-ENTMCNC: 27.4 PG — SIGNIFICANT CHANGE UP (ref 27–34)
MCHC RBC-ENTMCNC: 27.5 PG — SIGNIFICANT CHANGE UP (ref 27–34)
MCHC RBC-ENTMCNC: 27.5 PG — SIGNIFICANT CHANGE UP (ref 27–34)
MCHC RBC-ENTMCNC: 27.7 PG — SIGNIFICANT CHANGE UP (ref 27–34)
MCHC RBC-ENTMCNC: 28.1 PG — SIGNIFICANT CHANGE UP (ref 27–34)
MCHC RBC-ENTMCNC: 28.2 PG — SIGNIFICANT CHANGE UP (ref 27–34)
MCHC RBC-ENTMCNC: 28.8 PG — SIGNIFICANT CHANGE UP (ref 27–34)
MCHC RBC-ENTMCNC: 29.5 GM/DL — LOW (ref 32–36)
MCHC RBC-ENTMCNC: 29.9 GM/DL — LOW (ref 32–36)
MCHC RBC-ENTMCNC: 30.2 GM/DL — LOW (ref 32–36)
MCHC RBC-ENTMCNC: 30.3 GM/DL — LOW (ref 32–36)
MCHC RBC-ENTMCNC: 31 GM/DL — LOW (ref 32–36)
MCHC RBC-ENTMCNC: 31 GM/DL — LOW (ref 32–36)
MCHC RBC-ENTMCNC: 31 PG — SIGNIFICANT CHANGE UP (ref 27–34)
MCHC RBC-ENTMCNC: 31.3 GM/DL — LOW (ref 32–36)
MCHC RBC-ENTMCNC: 31.5 GM/DL — LOW (ref 32–36)
MCHC RBC-ENTMCNC: 31.8 GM/DL — LOW (ref 32–36)
MCHC RBC-ENTMCNC: 32.4 GM/DL — SIGNIFICANT CHANGE UP (ref 32–36)
MCHC RBC-ENTMCNC: 32.4 GM/DL — SIGNIFICANT CHANGE UP (ref 32–36)
MCV RBC AUTO: 102.6 FL — HIGH (ref 80–100)
MCV RBC AUTO: 85.7 FL — SIGNIFICANT CHANGE UP (ref 80–100)
MCV RBC AUTO: 86.6 FL — SIGNIFICANT CHANGE UP (ref 80–100)
MCV RBC AUTO: 86.9 FL — SIGNIFICANT CHANGE UP (ref 80–100)
MCV RBC AUTO: 87.1 FL — SIGNIFICANT CHANGE UP (ref 80–100)
MCV RBC AUTO: 88.7 FL — SIGNIFICANT CHANGE UP (ref 80–100)
MCV RBC AUTO: 89.5 FL — SIGNIFICANT CHANGE UP (ref 80–100)
MCV RBC AUTO: 90.7 FL — SIGNIFICANT CHANGE UP (ref 80–100)
MCV RBC AUTO: 90.8 FL — SIGNIFICANT CHANGE UP (ref 80–100)
MCV RBC AUTO: 91.1 FL — SIGNIFICANT CHANGE UP (ref 80–100)
MCV RBC AUTO: 92.7 FL — SIGNIFICANT CHANGE UP (ref 80–100)
METAMYELOCYTES # FLD: 2 % — HIGH (ref 0–0)
METAMYELOCYTES # FLD: 3 % — HIGH (ref 0–0)
METFORMIN LEVEL REPORTING LIMIT: 0.1 MCG/ML — SIGNIFICANT CHANGE UP
METFORMIN SERPL-MCNC: 0.25 MCG/ML — SIGNIFICANT CHANGE UP
METHADONE UR-MCNC: NEGATIVE — SIGNIFICANT CHANGE UP
METHOD TYPE: SIGNIFICANT CHANGE UP
METHOD TYPE: SIGNIFICANT CHANGE UP
MICROCYTES BLD QL: SLIGHT — SIGNIFICANT CHANGE UP
MONOCYTES # BLD AUTO: 0 K/UL — SIGNIFICANT CHANGE UP (ref 0–0.9)
MONOCYTES # BLD AUTO: 0.03 K/UL — SIGNIFICANT CHANGE UP (ref 0–0.9)
MONOCYTES # BLD AUTO: 0.25 K/UL — SIGNIFICANT CHANGE UP (ref 0–0.9)
MONOCYTES # BLD AUTO: 0.28 K/UL — SIGNIFICANT CHANGE UP (ref 0–0.9)
MONOCYTES # BLD AUTO: 1.01 K/UL — HIGH (ref 0–0.9)
MONOCYTES NFR BLD AUTO: 0 % — LOW (ref 2–14)
MONOCYTES NFR BLD AUTO: 0.7 % — LOW (ref 2–14)
MONOCYTES NFR BLD AUTO: 2 % — SIGNIFICANT CHANGE UP (ref 2–14)
MONOCYTES NFR BLD AUTO: 2.8 % — SIGNIFICANT CHANGE UP (ref 2–14)
MONOCYTES NFR BLD AUTO: 4 % — SIGNIFICANT CHANGE UP (ref 2–14)
MRSA SPEC QL CULT: SIGNIFICANT CHANGE UP
MSSA DNA SPEC QL NAA+PROBE: SIGNIFICANT CHANGE UP
N MEN ISLT CULT: SIGNIFICANT CHANGE UP
NEUTROPHILS # BLD AUTO: 10.45 K/UL — HIGH (ref 1.8–7.4)
NEUTROPHILS # BLD AUTO: 20.58 K/UL — HIGH (ref 1.8–7.4)
NEUTROPHILS # BLD AUTO: 21.56 K/UL — HIGH (ref 1.8–7.4)
NEUTROPHILS # BLD AUTO: 3.41 K/UL — SIGNIFICANT CHANGE UP (ref 1.8–7.4)
NEUTROPHILS # BLD AUTO: 8.85 K/UL — HIGH (ref 1.8–7.4)
NEUTROPHILS NFR BLD AUTO: 68 % — SIGNIFICANT CHANGE UP (ref 43–77)
NEUTROPHILS NFR BLD AUTO: 77 % — SIGNIFICANT CHANGE UP (ref 43–77)
NEUTROPHILS NFR BLD AUTO: 78.2 % — HIGH (ref 43–77)
NEUTROPHILS NFR BLD AUTO: 82.3 % — HIGH (ref 43–77)
NEUTROPHILS NFR BLD AUTO: 90 % — HIGH (ref 43–77)
NEUTS BAND # BLD: 14 % — HIGH (ref 0–8)
NEUTS BAND # BLD: 19 % — HIGH (ref 0–8)
NITRITE UR-MCNC: NEGATIVE — SIGNIFICANT CHANGE UP
NRBC # BLD: 0 /100 WBCS — SIGNIFICANT CHANGE UP (ref 0–0)
NRBC # BLD: 0 /100 — SIGNIFICANT CHANGE UP (ref 0–0)
NRBC # BLD: 0 /100 — SIGNIFICANT CHANGE UP (ref 0–0)
NRBC # BLD: 10 /100 WBCS — HIGH (ref 0–0)
NRBC # BLD: 12 /100 WBCS — HIGH (ref 0–0)
NRBC # BLD: 3 /100 WBCS — HIGH (ref 0–0)
NRBC # BLD: 7 /100 WBCS — HIGH (ref 0–0)
NRBC # BLD: SIGNIFICANT CHANGE UP /100 WBCS (ref 0–0)
NT-PROBNP SERPL-SCNC: HIGH PG/ML (ref 0–125)
OPIATES UR-MCNC: NEGATIVE — SIGNIFICANT CHANGE UP
ORGANISM # SPEC MICROSCOPIC CNT: SIGNIFICANT CHANGE UP
OSMOLALITY SERPL: 310 MOS/KG — HIGH (ref 275–300)
OVALOCYTES BLD QL SMEAR: SLIGHT — SIGNIFICANT CHANGE UP
P AERUGINOSA DNA BLD POS NAA+NON-PROBE: SIGNIFICANT CHANGE UP
PCO2 BLDA: 24 MMHG — LOW (ref 32–46)
PCO2 BLDA: 24 MMHG — LOW (ref 32–46)
PCO2 BLDA: 28 MMHG — LOW (ref 32–46)
PCO2 BLDA: 29 MMHG — LOW (ref 32–46)
PCO2 BLDA: 30 MMHG — LOW (ref 32–46)
PCO2 BLDA: 34 MMHG — SIGNIFICANT CHANGE UP (ref 32–46)
PCO2 BLDA: 35 MMHG — SIGNIFICANT CHANGE UP (ref 32–46)
PCO2 BLDA: 36 MMHG — SIGNIFICANT CHANGE UP (ref 32–46)
PCO2 BLDA: 36 MMHG — SIGNIFICANT CHANGE UP (ref 32–46)
PCO2 BLDA: 42 MMHG — SIGNIFICANT CHANGE UP (ref 32–46)
PCO2 BLDV: 37 MMHG — SIGNIFICANT CHANGE UP (ref 35–50)
PCO2 BLDV: 42 MMHG — SIGNIFICANT CHANGE UP (ref 35–50)
PCO2 BLDV: 48 MMHG — SIGNIFICANT CHANGE UP (ref 35–50)
PCP SPEC-MCNC: SIGNIFICANT CHANGE UP
PCP UR-MCNC: NEGATIVE — SIGNIFICANT CHANGE UP
PH BLD: 7.02 — CRITICAL LOW (ref 7.35–7.45)
PH BLD: 7.15 — CRITICAL LOW (ref 7.35–7.45)
PH BLD: 7.18 — CRITICAL LOW (ref 7.35–7.45)
PH BLD: 7.23 — LOW (ref 7.35–7.45)
PH BLD: 7.24 — LOW (ref 7.35–7.45)
PH BLD: 7.25 — LOW (ref 7.35–7.45)
PH BLD: 7.25 — LOW (ref 7.35–7.45)
PH BLD: 7.27 — LOW (ref 7.35–7.45)
PH BLD: 7.3 — LOW (ref 7.35–7.45)
PH BLD: 7.32 — LOW (ref 7.35–7.45)
PH BLDV: 7.19 — CRITICAL LOW (ref 7.35–7.45)
PH BLDV: 7.27 — LOW (ref 7.35–7.45)
PH BLDV: <6.8 — CRITICAL LOW (ref 7.35–7.45)
PH UR: 5 — SIGNIFICANT CHANGE UP (ref 5–8)
PHOSPHATE SERPL-MCNC: 1.5 MG/DL — LOW (ref 2.5–4.5)
PHOSPHATE SERPL-MCNC: 13.1 MG/DL — SIGNIFICANT CHANGE UP (ref 2.5–4.5)
PHOSPHATE SERPL-MCNC: 2.6 MG/DL — SIGNIFICANT CHANGE UP (ref 2.5–4.5)
PHOSPHATE SERPL-MCNC: 2.7 MG/DL — SIGNIFICANT CHANGE UP (ref 2.5–4.5)
PHOSPHATE SERPL-MCNC: 3.7 MG/DL — SIGNIFICANT CHANGE UP (ref 2.5–4.5)
PHOSPHATE SERPL-MCNC: 4 MG/DL — SIGNIFICANT CHANGE UP (ref 2.5–4.5)
PHOSPHATE SERPL-MCNC: 4 MG/DL — SIGNIFICANT CHANGE UP (ref 2.5–4.5)
PHOSPHATE SERPL-MCNC: 6 MG/DL — HIGH (ref 2.5–4.5)
PHOSPHATE SERPL-MCNC: 7 MG/DL — HIGH (ref 2.5–4.5)
PLAT MORPH BLD: ABNORMAL
PLAT MORPH BLD: NORMAL — SIGNIFICANT CHANGE UP
PLATELET # BLD AUTO: 210 K/UL — SIGNIFICANT CHANGE UP (ref 150–400)
PLATELET # BLD AUTO: 25 K/UL — LOW (ref 150–400)
PLATELET # BLD AUTO: 52 K/UL — LOW (ref 150–400)
PLATELET # BLD AUTO: 52 K/UL — LOW (ref 150–400)
PLATELET # BLD AUTO: 56 K/UL — LOW (ref 150–400)
PLATELET # BLD AUTO: 63 K/UL — LOW (ref 150–400)
PLATELET # BLD AUTO: 69 K/UL — LOW (ref 150–400)
PLATELET # BLD AUTO: 71 K/UL — LOW (ref 150–400)
PLATELET # BLD AUTO: 77 K/UL — LOW (ref 150–400)
PLATELET # BLD AUTO: 84 K/UL — LOW (ref 150–400)
PLATELET # BLD AUTO: 91 K/UL — LOW (ref 150–400)
PLATELET COUNT - ESTIMATE: ABNORMAL
PO2 BLDA: 100 MMHG — SIGNIFICANT CHANGE UP (ref 74–108)
PO2 BLDA: 112 MMHG — HIGH (ref 74–108)
PO2 BLDA: 139 MMHG — HIGH (ref 74–108)
PO2 BLDA: 159 MMHG — HIGH (ref 74–108)
PO2 BLDA: 53 MMHG — LOW (ref 74–108)
PO2 BLDA: 67 MMHG — LOW (ref 74–108)
PO2 BLDA: 72 MMHG — LOW (ref 74–108)
PO2 BLDA: 89 MMHG — SIGNIFICANT CHANGE UP (ref 74–108)
PO2 BLDA: 90 MMHG — SIGNIFICANT CHANGE UP (ref 74–108)
PO2 BLDA: 90 MMHG — SIGNIFICANT CHANGE UP (ref 74–108)
PO2 BLDA: 91 MMHG — SIGNIFICANT CHANGE UP (ref 74–108)
PO2 BLDA: 94 MMHG — SIGNIFICANT CHANGE UP (ref 74–108)
PO2 BLDV: <44 MMHG — SIGNIFICANT CHANGE UP (ref 25–45)
POLYCHROMASIA BLD QL SMEAR: SLIGHT — SIGNIFICANT CHANGE UP
POTASSIUM SERPL-MCNC: 2.7 MMOL/L — CRITICAL LOW (ref 3.5–5.3)
POTASSIUM SERPL-MCNC: 3.3 MMOL/L — LOW (ref 3.5–5.3)
POTASSIUM SERPL-MCNC: 3.6 MMOL/L — SIGNIFICANT CHANGE UP (ref 3.5–5.3)
POTASSIUM SERPL-MCNC: 3.9 MMOL/L — SIGNIFICANT CHANGE UP (ref 3.5–5.3)
POTASSIUM SERPL-MCNC: 4.1 MMOL/L — SIGNIFICANT CHANGE UP (ref 3.5–5.3)
POTASSIUM SERPL-MCNC: 4.3 MMOL/L — SIGNIFICANT CHANGE UP (ref 3.5–5.3)
POTASSIUM SERPL-MCNC: 4.4 MMOL/L — SIGNIFICANT CHANGE UP (ref 3.5–5.3)
POTASSIUM SERPL-MCNC: 4.6 MMOL/L — SIGNIFICANT CHANGE UP (ref 3.5–5.3)
POTASSIUM SERPL-MCNC: 4.6 MMOL/L — SIGNIFICANT CHANGE UP (ref 3.5–5.3)
POTASSIUM SERPL-MCNC: 4.9 MMOL/L — SIGNIFICANT CHANGE UP (ref 3.5–5.3)
POTASSIUM SERPL-MCNC: 5 MMOL/L — SIGNIFICANT CHANGE UP (ref 3.5–5.3)
POTASSIUM SERPL-MCNC: 5.3 MMOL/L — SIGNIFICANT CHANGE UP (ref 3.5–5.3)
POTASSIUM SERPL-MCNC: 5.5 MMOL/L — HIGH (ref 3.5–5.3)
POTASSIUM SERPL-MCNC: 8.5 MMOL/L — CRITICAL HIGH (ref 3.5–5.3)
POTASSIUM SERPL-SCNC: 2.7 MMOL/L — CRITICAL LOW (ref 3.5–5.3)
POTASSIUM SERPL-SCNC: 3.3 MMOL/L — LOW (ref 3.5–5.3)
POTASSIUM SERPL-SCNC: 3.6 MMOL/L — SIGNIFICANT CHANGE UP (ref 3.5–5.3)
POTASSIUM SERPL-SCNC: 3.9 MMOL/L — SIGNIFICANT CHANGE UP (ref 3.5–5.3)
POTASSIUM SERPL-SCNC: 4.1 MMOL/L — SIGNIFICANT CHANGE UP (ref 3.5–5.3)
POTASSIUM SERPL-SCNC: 4.3 MMOL/L — SIGNIFICANT CHANGE UP (ref 3.5–5.3)
POTASSIUM SERPL-SCNC: 4.4 MMOL/L — SIGNIFICANT CHANGE UP (ref 3.5–5.3)
POTASSIUM SERPL-SCNC: 4.6 MMOL/L — SIGNIFICANT CHANGE UP (ref 3.5–5.3)
POTASSIUM SERPL-SCNC: 4.6 MMOL/L — SIGNIFICANT CHANGE UP (ref 3.5–5.3)
POTASSIUM SERPL-SCNC: 4.9 MMOL/L — SIGNIFICANT CHANGE UP (ref 3.5–5.3)
POTASSIUM SERPL-SCNC: 5 MMOL/L — SIGNIFICANT CHANGE UP (ref 3.5–5.3)
POTASSIUM SERPL-SCNC: 5.3 MMOL/L — SIGNIFICANT CHANGE UP (ref 3.5–5.3)
POTASSIUM SERPL-SCNC: 5.5 MMOL/L — HIGH (ref 3.5–5.3)
POTASSIUM SERPL-SCNC: 8.5 MMOL/L — CRITICAL HIGH (ref 3.5–5.3)
PROCALCITONIN SERPL-MCNC: >100 NG/ML — HIGH (ref 0.02–0.1)
PROT ?TM UR-MCNC: 109 MG/DL — HIGH (ref 0–12)
PROT SERPL-MCNC: 3.4 GM/DL — LOW (ref 6–8.3)
PROT SERPL-MCNC: 3.7 GM/DL — LOW (ref 6–8.3)
PROT SERPL-MCNC: 4.6 GM/DL — LOW (ref 6–8.3)
PROT SERPL-MCNC: 4.7 GM/DL — LOW (ref 6–8.3)
PROT SERPL-MCNC: 4.7 GM/DL — LOW (ref 6–8.3)
PROT SERPL-MCNC: 4.9 GM/DL — LOW (ref 6–8.3)
PROT SERPL-MCNC: 4.9 GM/DL — LOW (ref 6–8.3)
PROT SERPL-MCNC: 5 GM/DL — LOW (ref 6–8.3)
PROT SERPL-MCNC: 5.2 GM/DL — LOW (ref 6–8.3)
PROT SERPL-MCNC: 5.3 GM/DL — LOW (ref 6–8.3)
PROT SERPL-MCNC: 5.5 GM/DL — LOW (ref 6–8.3)
PROT SERPL-MCNC: 7.8 GM/DL — SIGNIFICANT CHANGE UP (ref 6–8.3)
PROT UR-MCNC: 30 MG/DL
PROTHROM AB SERPL-ACNC: 11.2 SEC — SIGNIFICANT CHANGE UP (ref 9.8–12.7)
PROTHROM AB SERPL-ACNC: 17.1 SEC — HIGH (ref 9.8–12.7)
PROTHROM AB SERPL-ACNC: 18.7 SEC — HIGH (ref 9.8–12.7)
PROTHROM AB SERPL-ACNC: 19.9 SEC — HIGH (ref 9.8–12.7)
PROTHROM AB SERPL-ACNC: 23 SEC — HIGH (ref 9.8–12.7)
PROTHROM AB SERPL-ACNC: 25.8 SEC — HIGH (ref 9.8–12.7)
PROTHROM AB SERPL-ACNC: 27.5 SEC — HIGH (ref 9.8–12.7)
PROTHROM AB SERPL-ACNC: 36 SEC — HIGH (ref 9.8–12.7)
PROTHROM AB SERPL-ACNC: 38.8 SEC — HIGH (ref 9.8–12.7)
RBC # BLD: 1.55 M/UL — LOW (ref 3.8–5.2)
RBC # BLD: 2.38 M/UL — LOW (ref 3.8–5.2)
RBC # BLD: 2.6 M/UL — LOW (ref 3.8–5.2)
RBC # BLD: 3.15 M/UL — LOW (ref 3.8–5.2)
RBC # BLD: 3.24 M/UL — LOW (ref 3.8–5.2)
RBC # BLD: 3.5 M/UL — LOW (ref 3.8–5.2)
RBC # BLD: 3.64 M/UL — LOW (ref 3.8–5.2)
RBC # BLD: 3.81 M/UL — SIGNIFICANT CHANGE UP (ref 3.8–5.2)
RBC # BLD: 3.83 M/UL — SIGNIFICANT CHANGE UP (ref 3.8–5.2)
RBC # BLD: 4.15 M/UL — SIGNIFICANT CHANGE UP (ref 3.8–5.2)
RBC # BLD: 4.77 M/UL — SIGNIFICANT CHANGE UP (ref 3.8–5.2)
RBC # FLD: 14.6 % — HIGH (ref 10.3–14.5)
RBC # FLD: 15 % — HIGH (ref 10.3–14.5)
RBC # FLD: 15.2 % — HIGH (ref 10.3–14.5)
RBC # FLD: 16 % — HIGH (ref 10.3–14.5)
RBC # FLD: 16.5 % — HIGH (ref 10.3–14.5)
RBC # FLD: 16.7 % — HIGH (ref 10.3–14.5)
RBC # FLD: 17 % — HIGH (ref 10.3–14.5)
RBC # FLD: 18.7 % — HIGH (ref 10.3–14.5)
RBC # FLD: 19.3 % — HIGH (ref 10.3–14.5)
RBC # FLD: 21 % — HIGH (ref 10.3–14.5)
RBC # FLD: SIGNIFICANT CHANGE UP (ref 10.3–14.5)
RBC BLD AUTO: ABNORMAL
RBC BLD AUTO: ABNORMAL
RBC CASTS # UR COMP ASSIST: SIGNIFICANT CHANGE UP /HPF (ref 0–4)
S MARCESCENS DNA BLD POS NAA+NON-PROBE: SIGNIFICANT CHANGE UP
S PNEUM DNA BLD POS QL NAA+NON-PROBE: SIGNIFICANT CHANGE UP
S PYO DNA BLD POS QL NAA+NON-PROBE: SIGNIFICANT CHANGE UP
SALICYLATES SERPL-MCNC: <1.7 MG/DL — LOW (ref 2.8–20)
SAO2 % BLDA: 79 % — LOW (ref 92–96)
SAO2 % BLDA: 89 % — LOW (ref 92–96)
SAO2 % BLDA: 92 % — SIGNIFICANT CHANGE UP (ref 92–96)
SAO2 % BLDA: 93 % — SIGNIFICANT CHANGE UP (ref 92–96)
SAO2 % BLDA: 95 % — SIGNIFICANT CHANGE UP (ref 92–96)
SAO2 % BLDA: 95 % — SIGNIFICANT CHANGE UP (ref 92–96)
SAO2 % BLDA: 96 % — SIGNIFICANT CHANGE UP (ref 92–96)
SAO2 % BLDA: 96 % — SIGNIFICANT CHANGE UP (ref 92–96)
SAO2 % BLDA: 97 % — HIGH (ref 92–96)
SAO2 % BLDA: 98 % — HIGH (ref 92–96)
SAO2 % BLDA: 98 % — HIGH (ref 92–96)
SAO2 % BLDA: 99 % — HIGH (ref 92–96)
SAO2 % BLDV: 35 % — LOW (ref 67–88)
SAO2 % BLDV: 43 % — LOW (ref 67–88)
SAO2 % BLDV: 55 % — LOW (ref 67–88)
SCHISTOCYTES BLD QL AUTO: SLIGHT — SIGNIFICANT CHANGE UP
SMUDGE CELLS # BLD: PRESENT — SIGNIFICANT CHANGE UP
SMUDGE CELLS # BLD: SIGNIFICANT CHANGE UP
SODIUM SERPL-SCNC: 132 MMOL/L — LOW (ref 135–145)
SODIUM SERPL-SCNC: 139 MMOL/L — SIGNIFICANT CHANGE UP (ref 135–145)
SODIUM SERPL-SCNC: 142 MMOL/L — SIGNIFICANT CHANGE UP (ref 135–145)
SODIUM SERPL-SCNC: 143 MMOL/L — SIGNIFICANT CHANGE UP (ref 135–145)
SODIUM SERPL-SCNC: 144 MMOL/L — SIGNIFICANT CHANGE UP (ref 135–145)
SODIUM SERPL-SCNC: 144 MMOL/L — SIGNIFICANT CHANGE UP (ref 135–145)
SODIUM SERPL-SCNC: 145 MMOL/L — SIGNIFICANT CHANGE UP (ref 135–145)
SODIUM SERPL-SCNC: 145 MMOL/L — SIGNIFICANT CHANGE UP (ref 135–145)
SODIUM SERPL-SCNC: 147 MMOL/L — HIGH (ref 135–145)
SODIUM UR-SCNC: 21 MMOL/L — SIGNIFICANT CHANGE UP
SP GR SPEC: 1 — LOW (ref 1.01–1.02)
SPECIMEN SOURCE: SIGNIFICANT CHANGE UP
THC UR QL: NEGATIVE — SIGNIFICANT CHANGE UP
TRIGL SERPL-MCNC: 562 MG/DL — HIGH (ref 10–149)
TROPONIN I SERPL-MCNC: 0.04 NG/ML — SIGNIFICANT CHANGE UP (ref 0.01–0.04)
TROPONIN I SERPL-MCNC: 0.62 NG/ML — HIGH (ref 0.01–0.04)
TROPONIN I SERPL-MCNC: 3.31 NG/ML — HIGH (ref 0.01–0.04)
TROPONIN I SERPL-MCNC: 4.52 NG/ML — HIGH (ref 0.01–0.04)
TROPONIN I SERPL-MCNC: 4.58 NG/ML — HIGH (ref 0.01–0.04)
TROPONIN I SERPL-MCNC: 4.59 NG/ML — HIGH (ref 0.01–0.04)
TROPONIN I SERPL-MCNC: 5.67 NG/ML — HIGH (ref 0.01–0.04)
TROPONIN I SERPL-MCNC: 6.12 NG/ML — HIGH (ref 0.01–0.04)
TROPONIN I SERPL-MCNC: 6.22 NG/ML — HIGH (ref 0.01–0.04)
TROPONIN I SERPL-MCNC: 7.43 NG/ML — HIGH (ref 0.01–0.04)
TROPONIN I SERPL-MCNC: <.015 NG/ML — SIGNIFICANT CHANGE UP (ref 0.01–0.04)
UROBILINOGEN FLD QL: NEGATIVE MG/DL — SIGNIFICANT CHANGE UP
VANCOMYCIN FLD-MCNC: 5.4 UG/ML — SIGNIFICANT CHANGE UP
VANCOMYCIN FLD-MCNC: 6.6 UG/ML — SIGNIFICANT CHANGE UP
VANCOMYCIN TROUGH SERPL-MCNC: 12.2 UG/ML — SIGNIFICANT CHANGE UP (ref 10–20)
WBC # BLD: 12.74 K/UL — HIGH (ref 3.8–10.5)
WBC # BLD: 16.22 K/UL — HIGH (ref 3.8–10.5)
WBC # BLD: 19.07 K/UL — HIGH (ref 3.8–10.5)
WBC # BLD: 22.46 K/UL — HIGH (ref 3.8–10.5)
WBC # BLD: 22.6 K/UL — HIGH (ref 3.8–10.5)
WBC # BLD: 25.05 K/UL — HIGH (ref 3.8–10.5)
WBC # BLD: 31.49 K/UL — HIGH (ref 3.8–10.5)
WBC # BLD: 4.36 K/UL — SIGNIFICANT CHANGE UP (ref 3.8–10.5)
WBC # BLD: 43.43 K/UL — CRITICAL HIGH (ref 3.8–10.5)
WBC # BLD: 45.97 K/UL — CRITICAL HIGH (ref 3.8–10.5)
WBC # BLD: 9.84 K/UL — SIGNIFICANT CHANGE UP (ref 3.8–10.5)
WBC # FLD AUTO: 12.74 K/UL — HIGH (ref 3.8–10.5)
WBC # FLD AUTO: 16.22 K/UL — HIGH (ref 3.8–10.5)
WBC # FLD AUTO: 19.07 K/UL — HIGH (ref 3.8–10.5)
WBC # FLD AUTO: 22.46 K/UL — HIGH (ref 3.8–10.5)
WBC # FLD AUTO: 22.6 K/UL — HIGH (ref 3.8–10.5)
WBC # FLD AUTO: 25.05 K/UL — HIGH (ref 3.8–10.5)
WBC # FLD AUTO: 31.49 K/UL — HIGH (ref 3.8–10.5)
WBC # FLD AUTO: 4.36 K/UL — SIGNIFICANT CHANGE UP (ref 3.8–10.5)
WBC # FLD AUTO: 43.43 K/UL — CRITICAL HIGH (ref 3.8–10.5)
WBC # FLD AUTO: 45.97 K/UL — CRITICAL HIGH (ref 3.8–10.5)
WBC # FLD AUTO: 9.84 K/UL — SIGNIFICANT CHANGE UP (ref 3.8–10.5)

## 2018-01-01 PROCEDURE — 99291 CRITICAL CARE FIRST HOUR: CPT | Mod: 25

## 2018-01-01 PROCEDURE — 74177 CT ABD & PELVIS W/CONTRAST: CPT | Mod: 26

## 2018-01-01 PROCEDURE — 93010 ELECTROCARDIOGRAM REPORT: CPT

## 2018-01-01 PROCEDURE — 93306 TTE W/DOPPLER COMPLETE: CPT | Mod: 26

## 2018-01-01 PROCEDURE — 76700 US EXAM ABDOM COMPLETE: CPT | Mod: 26

## 2018-01-01 PROCEDURE — 71045 X-RAY EXAM CHEST 1 VIEW: CPT | Mod: 26

## 2018-01-01 PROCEDURE — 99292 CRITICAL CARE ADDL 30 MIN: CPT

## 2018-01-01 PROCEDURE — 99291 CRITICAL CARE FIRST HOUR: CPT

## 2018-01-01 PROCEDURE — 36556 INSERT NON-TUNNEL CV CATH: CPT

## 2018-01-01 PROCEDURE — 71275 CT ANGIOGRAPHY CHEST: CPT | Mod: 26

## 2018-01-01 PROCEDURE — 76937 US GUIDE VASCULAR ACCESS: CPT | Mod: 26

## 2018-01-01 PROCEDURE — 71045 X-RAY EXAM CHEST 1 VIEW: CPT | Mod: 26,76

## 2018-01-01 PROCEDURE — 36800 INSERTION OF CANNULA: CPT

## 2018-01-01 PROCEDURE — 99221 1ST HOSP IP/OBS SF/LOW 40: CPT

## 2018-01-01 PROCEDURE — 74018 RADEX ABDOMEN 1 VIEW: CPT | Mod: 26

## 2018-01-01 PROCEDURE — 71046 X-RAY EXAM CHEST 2 VIEWS: CPT | Mod: 26

## 2018-01-01 PROCEDURE — 76705 ECHO EXAM OF ABDOMEN: CPT | Mod: 26

## 2018-01-01 RX ORDER — SODIUM CHLORIDE 9 MG/ML
2000 INJECTION INTRAMUSCULAR; INTRAVENOUS; SUBCUTANEOUS ONCE
Qty: 0 | Refills: 0 | Status: COMPLETED | OUTPATIENT
Start: 2018-01-01 | End: 2018-01-01

## 2018-01-01 RX ORDER — HYDROCORTISONE 20 MG
50 TABLET ORAL EVERY 6 HOURS
Qty: 0 | Refills: 0 | Status: DISCONTINUED | OUTPATIENT
Start: 2018-01-01 | End: 2018-01-01

## 2018-01-01 RX ORDER — AMIODARONE HYDROCHLORIDE 400 MG/1
1 TABLET ORAL
Qty: 900 | Refills: 0 | Status: DISCONTINUED | OUTPATIENT
Start: 2018-01-01 | End: 2018-01-01

## 2018-01-01 RX ORDER — FENTANYL CITRATE 50 UG/ML
50 INJECTION INTRAVENOUS ONCE
Qty: 0 | Refills: 0 | Status: DISCONTINUED | OUTPATIENT
Start: 2018-01-01 | End: 2018-01-01

## 2018-01-01 RX ORDER — ALTEPLASE 100 MG
62 KIT INTRAVENOUS ONCE
Qty: 0 | Refills: 0 | Status: DISCONTINUED | OUTPATIENT
Start: 2018-01-01 | End: 2018-01-01

## 2018-01-01 RX ORDER — DOBUTAMINE HCL 250MG/20ML
5 VIAL (ML) INTRAVENOUS
Qty: 1000 | Refills: 0 | Status: DISCONTINUED | OUTPATIENT
Start: 2018-01-01 | End: 2018-01-01

## 2018-01-01 RX ORDER — ACETAMINOPHEN 500 MG
650 TABLET ORAL ONCE
Qty: 0 | Refills: 0 | Status: COMPLETED | OUTPATIENT
Start: 2018-01-01 | End: 2018-01-01

## 2018-01-01 RX ORDER — ACETAMINOPHEN 500 MG
650 TABLET ORAL EVERY 6 HOURS
Qty: 0 | Refills: 0 | Status: DISCONTINUED | OUTPATIENT
Start: 2018-01-01 | End: 2018-01-01

## 2018-01-01 RX ORDER — SODIUM CHLORIDE 9 MG/ML
1000 INJECTION INTRAMUSCULAR; INTRAVENOUS; SUBCUTANEOUS ONCE
Qty: 0 | Refills: 0 | Status: COMPLETED | OUTPATIENT
Start: 2018-01-01 | End: 2018-01-01

## 2018-01-01 RX ORDER — SODIUM BICARBONATE 1 MEQ/ML
0.2 SYRINGE (ML) INTRAVENOUS
Qty: 150 | Refills: 0 | Status: DISCONTINUED | OUTPATIENT
Start: 2018-01-01 | End: 2018-01-01

## 2018-01-01 RX ORDER — DEXTROSE 50 % IN WATER 50 %
12.5 SYRINGE (ML) INTRAVENOUS ONCE
Qty: 0 | Refills: 0 | Status: DISCONTINUED | OUTPATIENT
Start: 2018-01-01 | End: 2018-01-01

## 2018-01-01 RX ORDER — MAGNESIUM SULFATE 500 MG/ML
2 VIAL (ML) INJECTION ONCE
Qty: 0 | Refills: 0 | Status: COMPLETED | OUTPATIENT
Start: 2018-01-01 | End: 2018-01-01

## 2018-01-01 RX ORDER — PROPOFOL 10 MG/ML
5 INJECTION, EMULSION INTRAVENOUS
Qty: 1000 | Refills: 0 | Status: DISCONTINUED | OUTPATIENT
Start: 2018-01-01 | End: 2018-01-01

## 2018-01-01 RX ORDER — LANOLIN ALCOHOL/MO/W.PET/CERES
5 CREAM (GRAM) TOPICAL AT BEDTIME
Qty: 0 | Refills: 0 | Status: DISCONTINUED | OUTPATIENT
Start: 2018-01-01 | End: 2018-01-01

## 2018-01-01 RX ORDER — FLUCONAZOLE 150 MG/1
100 TABLET ORAL ONCE
Qty: 0 | Refills: 0 | Status: COMPLETED | OUTPATIENT
Start: 2018-01-01 | End: 2018-01-01

## 2018-01-01 RX ORDER — INSULIN LISPRO 100/ML
VIAL (ML) SUBCUTANEOUS AT BEDTIME
Qty: 0 | Refills: 0 | Status: DISCONTINUED | OUTPATIENT
Start: 2018-01-01 | End: 2018-01-01

## 2018-01-01 RX ORDER — AMIODARONE HYDROCHLORIDE 400 MG/1
200 TABLET ORAL DAILY
Qty: 0 | Refills: 0 | Status: DISCONTINUED | OUTPATIENT
Start: 2018-01-01 | End: 2018-01-01

## 2018-01-01 RX ORDER — SODIUM CHLORIDE 9 MG/ML
2000 INJECTION, SOLUTION INTRAVENOUS
Qty: 0 | Refills: 0 | Status: DISCONTINUED | OUTPATIENT
Start: 2018-01-01 | End: 2018-01-01

## 2018-01-01 RX ORDER — HEPARIN SODIUM 5000 [USP'U]/ML
INJECTION INTRAVENOUS; SUBCUTANEOUS
Qty: 25000 | Refills: 0 | Status: DISCONTINUED | OUTPATIENT
Start: 2018-01-01 | End: 2018-01-01

## 2018-01-01 RX ORDER — SODIUM CHLORIDE 9 MG/ML
1000 INJECTION, SOLUTION INTRAVENOUS
Qty: 0 | Refills: 0 | Status: DISCONTINUED | OUTPATIENT
Start: 2018-01-01 | End: 2018-01-01

## 2018-01-01 RX ORDER — TOBRAMYCIN SULFATE 40 MG/ML
80 VIAL (ML) INJECTION ONCE
Qty: 0 | Refills: 0 | Status: COMPLETED | OUTPATIENT
Start: 2018-01-01 | End: 2018-01-01

## 2018-01-01 RX ORDER — VANCOMYCIN HCL 1 G
750 VIAL (EA) INTRAVENOUS ONCE
Qty: 0 | Refills: 0 | Status: COMPLETED | OUTPATIENT
Start: 2018-01-01 | End: 2018-01-01

## 2018-01-01 RX ORDER — ALTEPLASE 100 MG
100 KIT INTRAVENOUS ONCE
Qty: 0 | Refills: 0 | Status: DISCONTINUED | OUTPATIENT
Start: 2018-01-01 | End: 2018-01-01

## 2018-01-01 RX ORDER — VANCOMYCIN HCL 1 G
VIAL (EA) INTRAVENOUS
Qty: 0 | Refills: 0 | Status: DISCONTINUED | OUTPATIENT
Start: 2018-01-01 | End: 2018-01-01

## 2018-01-01 RX ORDER — SODIUM BICARBONATE 1 MEQ/ML
100 SYRINGE (ML) INTRAVENOUS ONCE
Qty: 0 | Refills: 0 | Status: COMPLETED | OUTPATIENT
Start: 2018-01-01 | End: 2018-01-01

## 2018-01-01 RX ORDER — METOPROLOL TARTRATE 50 MG
5 TABLET ORAL ONCE
Qty: 0 | Refills: 0 | Status: DISCONTINUED | OUTPATIENT
Start: 2018-01-01 | End: 2018-01-01

## 2018-01-01 RX ORDER — PHYTONADIONE (VIT K1) 5 MG
5 TABLET ORAL DAILY
Qty: 0 | Refills: 0 | Status: DISCONTINUED | OUTPATIENT
Start: 2018-01-01 | End: 2018-01-01

## 2018-01-01 RX ORDER — PIPERACILLIN AND TAZOBACTAM 4; .5 G/20ML; G/20ML
3.38 INJECTION, POWDER, LYOPHILIZED, FOR SOLUTION INTRAVENOUS ONCE
Qty: 0 | Refills: 0 | Status: COMPLETED | OUTPATIENT
Start: 2018-01-01 | End: 2018-01-01

## 2018-01-01 RX ORDER — CEFTRIAXONE 500 MG/1
2 INJECTION, POWDER, FOR SOLUTION INTRAMUSCULAR; INTRAVENOUS ONCE
Qty: 0 | Refills: 0 | Status: COMPLETED | OUTPATIENT
Start: 2018-01-01 | End: 2018-01-01

## 2018-01-01 RX ORDER — PHYTONADIONE (VIT K1) 5 MG
10 TABLET ORAL ONCE
Qty: 0 | Refills: 0 | Status: COMPLETED | OUTPATIENT
Start: 2018-01-01 | End: 2018-01-01

## 2018-01-01 RX ORDER — POTASSIUM CHLORIDE 20 MEQ
40 PACKET (EA) ORAL EVERY 4 HOURS
Qty: 0 | Refills: 0 | Status: COMPLETED | OUTPATIENT
Start: 2018-01-01 | End: 2018-01-01

## 2018-01-01 RX ORDER — METRONIDAZOLE 500 MG
500 TABLET ORAL ONCE
Qty: 0 | Refills: 0 | Status: COMPLETED | OUTPATIENT
Start: 2018-01-01 | End: 2018-01-01

## 2018-01-01 RX ORDER — SODIUM BICARBONATE 1 MEQ/ML
50 SYRINGE (ML) INTRAVENOUS
Qty: 0 | Refills: 0 | Status: COMPLETED | OUTPATIENT
Start: 2018-01-01 | End: 2018-01-01

## 2018-01-01 RX ORDER — NOREPINEPHRINE BITARTRATE/D5W 8 MG/250ML
1 PLASTIC BAG, INJECTION (ML) INTRAVENOUS
Qty: 16 | Refills: 0 | Status: DISCONTINUED | OUTPATIENT
Start: 2018-01-01 | End: 2018-01-01

## 2018-01-01 RX ORDER — IBUPROFEN 200 MG
600 TABLET ORAL ONCE
Qty: 0 | Refills: 0 | Status: COMPLETED | OUTPATIENT
Start: 2018-01-01 | End: 2018-01-01

## 2018-01-01 RX ORDER — DEXTROSE 50 % IN WATER 50 %
15 SYRINGE (ML) INTRAVENOUS ONCE
Qty: 0 | Refills: 0 | Status: DISCONTINUED | OUTPATIENT
Start: 2018-01-01 | End: 2018-01-01

## 2018-01-01 RX ORDER — ASCORBIC ACID 60 MG
1500 TABLET,CHEWABLE ORAL EVERY 6 HOURS
Qty: 0 | Refills: 0 | Status: COMPLETED | OUTPATIENT
Start: 2018-01-01 | End: 2018-01-01

## 2018-01-01 RX ORDER — AMIODARONE HYDROCHLORIDE 400 MG/1
1 TABLET ORAL
Qty: 900 | Refills: 0 | Status: COMPLETED | OUTPATIENT
Start: 2018-01-01 | End: 2018-01-01

## 2018-01-01 RX ORDER — ONDANSETRON 8 MG/1
4 TABLET, FILM COATED ORAL ONCE
Qty: 0 | Refills: 0 | Status: COMPLETED | OUTPATIENT
Start: 2018-01-01 | End: 2018-01-01

## 2018-01-01 RX ORDER — PANTOPRAZOLE SODIUM 20 MG/1
40 TABLET, DELAYED RELEASE ORAL DAILY
Qty: 0 | Refills: 0 | Status: DISCONTINUED | OUTPATIENT
Start: 2018-01-01 | End: 2018-01-01

## 2018-01-01 RX ORDER — CALCIUM GLUCONATE 100 MG/ML
2 VIAL (ML) INTRAVENOUS DAILY
Qty: 0 | Refills: 0 | Status: DISCONTINUED | OUTPATIENT
Start: 2018-01-01 | End: 2018-01-01

## 2018-01-01 RX ORDER — ALBUTEROL 90 UG/1
2.5 AEROSOL, METERED ORAL EVERY 6 HOURS
Qty: 0 | Refills: 0 | Status: DISCONTINUED | OUTPATIENT
Start: 2018-01-01 | End: 2018-01-01

## 2018-01-01 RX ORDER — VANCOMYCIN HCL 1 G
1000 VIAL (EA) INTRAVENOUS ONCE
Qty: 0 | Refills: 0 | Status: COMPLETED | OUTPATIENT
Start: 2018-01-01 | End: 2018-01-01

## 2018-01-01 RX ORDER — CALCIUM GLUCONATE 100 MG/ML
1 VIAL (ML) INTRAVENOUS ONCE
Qty: 0 | Refills: 0 | Status: COMPLETED | OUTPATIENT
Start: 2018-01-01 | End: 2018-01-01

## 2018-01-01 RX ORDER — HEPARIN SODIUM 5000 [USP'U]/ML
3000 INJECTION INTRAVENOUS; SUBCUTANEOUS EVERY 6 HOURS
Qty: 0 | Refills: 0 | Status: DISCONTINUED | OUTPATIENT
Start: 2018-01-01 | End: 2018-01-01

## 2018-01-01 RX ORDER — MEROPENEM 1 G/30ML
1000 INJECTION INTRAVENOUS ONCE
Qty: 0 | Refills: 0 | Status: DISCONTINUED | OUTPATIENT
Start: 2018-01-01 | End: 2018-01-01

## 2018-01-01 RX ORDER — AMIODARONE HYDROCHLORIDE 400 MG/1
0.5 TABLET ORAL
Qty: 900 | Refills: 0 | Status: DISCONTINUED | OUTPATIENT
Start: 2018-01-01 | End: 2018-01-01

## 2018-01-01 RX ORDER — IPRATROPIUM/ALBUTEROL SULFATE 18-103MCG
3 AEROSOL WITH ADAPTER (GRAM) INHALATION ONCE
Qty: 0 | Refills: 0 | Status: COMPLETED | OUTPATIENT
Start: 2018-01-01 | End: 2018-01-01

## 2018-01-01 RX ORDER — CHLORHEXIDINE GLUCONATE 213 G/1000ML
15 SOLUTION TOPICAL
Qty: 0 | Refills: 0 | Status: DISCONTINUED | OUTPATIENT
Start: 2018-01-01 | End: 2018-01-01

## 2018-01-01 RX ORDER — ALTEPLASE 100 MG
7 KIT INTRAVENOUS ONCE
Qty: 0 | Refills: 0 | Status: DISCONTINUED | OUTPATIENT
Start: 2018-01-01 | End: 2018-01-01

## 2018-01-01 RX ORDER — SODIUM BICARBONATE 1 MEQ/ML
0.16 SYRINGE (ML) INTRAVENOUS
Qty: 150 | Refills: 0 | Status: DISCONTINUED | OUTPATIENT
Start: 2018-01-01 | End: 2018-01-01

## 2018-01-01 RX ORDER — KETOROLAC TROMETHAMINE 30 MG/ML
15 SYRINGE (ML) INJECTION ONCE
Qty: 0 | Refills: 0 | Status: DISCONTINUED | OUTPATIENT
Start: 2018-01-01 | End: 2018-01-01

## 2018-01-01 RX ORDER — AMIODARONE HYDROCHLORIDE 400 MG/1
150 TABLET ORAL ONCE
Qty: 0 | Refills: 0 | Status: COMPLETED | OUTPATIENT
Start: 2018-01-01 | End: 2018-01-01

## 2018-01-01 RX ORDER — SODIUM BICARBONATE 1 MEQ/ML
50 SYRINGE (ML) INTRAVENOUS ONCE
Qty: 0 | Refills: 0 | Status: COMPLETED | OUTPATIENT
Start: 2018-01-01 | End: 2018-01-01

## 2018-01-01 RX ORDER — LANOLIN ALCOHOL/MO/W.PET/CERES
3 CREAM (GRAM) TOPICAL AT BEDTIME
Qty: 0 | Refills: 0 | Status: COMPLETED | OUTPATIENT
Start: 2018-01-01 | End: 2018-01-01

## 2018-01-01 RX ORDER — FLUCONAZOLE 150 MG/1
100 TABLET ORAL EVERY 24 HOURS
Qty: 0 | Refills: 0 | Status: DISCONTINUED | OUTPATIENT
Start: 2018-01-01 | End: 2018-01-01

## 2018-01-01 RX ORDER — FLUCONAZOLE 150 MG/1
TABLET ORAL
Qty: 0 | Refills: 0 | Status: DISCONTINUED | OUTPATIENT
Start: 2018-01-01 | End: 2018-01-01

## 2018-01-01 RX ORDER — SODIUM BICARBONATE 1 MEQ/ML
50 SYRINGE (ML) INTRAVENOUS ONCE
Qty: 0 | Refills: 0 | Status: DISCONTINUED | OUTPATIENT
Start: 2018-01-01 | End: 2018-01-01

## 2018-01-01 RX ORDER — DOBUTAMINE HCL 250MG/20ML
5 VIAL (ML) INTRAVENOUS
Qty: 500 | Refills: 0 | Status: DISCONTINUED | OUTPATIENT
Start: 2018-01-01 | End: 2018-01-01

## 2018-01-01 RX ORDER — DEXMEDETOMIDINE HYDROCHLORIDE IN 0.9% SODIUM CHLORIDE 4 UG/ML
0.7 INJECTION INTRAVENOUS
Qty: 200 | Refills: 0 | Status: DISCONTINUED | OUTPATIENT
Start: 2018-01-01 | End: 2018-01-01

## 2018-01-01 RX ORDER — ACETAMINOPHEN 500 MG
975 TABLET ORAL ONCE
Qty: 0 | Refills: 0 | Status: COMPLETED | OUTPATIENT
Start: 2018-01-01 | End: 2018-01-01

## 2018-01-01 RX ORDER — DIPHENHYDRAMINE HCL 50 MG
50 CAPSULE ORAL DAILY
Qty: 0 | Refills: 0 | Status: DISCONTINUED | OUTPATIENT
Start: 2018-01-01 | End: 2018-01-01

## 2018-01-01 RX ORDER — MEROPENEM 1 G/30ML
INJECTION INTRAVENOUS
Qty: 0 | Refills: 0 | Status: DISCONTINUED | OUTPATIENT
Start: 2018-01-01 | End: 2018-01-01

## 2018-01-01 RX ORDER — ENOXAPARIN SODIUM 100 MG/ML
40 INJECTION SUBCUTANEOUS EVERY 24 HOURS
Qty: 0 | Refills: 0 | Status: DISCONTINUED | OUTPATIENT
Start: 2018-01-01 | End: 2018-01-01

## 2018-01-01 RX ORDER — CALCIUM CHLORIDE
1000 POWDER (GRAM) MISCELLANEOUS ONCE
Qty: 0 | Refills: 0 | Status: DISCONTINUED | OUTPATIENT
Start: 2018-01-01 | End: 2018-01-01

## 2018-01-01 RX ORDER — MIDODRINE HYDROCHLORIDE 2.5 MG/1
10 TABLET ORAL EVERY 8 HOURS
Qty: 0 | Refills: 0 | Status: DISCONTINUED | OUTPATIENT
Start: 2018-01-01 | End: 2018-01-01

## 2018-01-01 RX ORDER — CEFTRIAXONE 500 MG/1
INJECTION, POWDER, FOR SOLUTION INTRAMUSCULAR; INTRAVENOUS
Qty: 0 | Refills: 0 | Status: DISCONTINUED | OUTPATIENT
Start: 2018-01-01 | End: 2018-01-01

## 2018-01-01 RX ORDER — NOREPINEPHRINE BITARTRATE/D5W 8 MG/250ML
0.1 PLASTIC BAG, INJECTION (ML) INTRAVENOUS
Qty: 8 | Refills: 0 | Status: DISCONTINUED | OUTPATIENT
Start: 2018-01-01 | End: 2018-01-01

## 2018-01-01 RX ORDER — POTASSIUM CHLORIDE 20 MEQ
40 PACKET (EA) ORAL ONCE
Qty: 0 | Refills: 0 | Status: COMPLETED | OUTPATIENT
Start: 2018-01-01 | End: 2018-01-01

## 2018-01-01 RX ORDER — ALBUMIN HUMAN 25 %
50 VIAL (ML) INTRAVENOUS ONCE
Qty: 0 | Refills: 0 | Status: COMPLETED | OUTPATIENT
Start: 2018-01-01 | End: 2018-01-01

## 2018-01-01 RX ORDER — DEXTROSE 50 % IN WATER 50 %
50 SYRINGE (ML) INTRAVENOUS ONCE
Qty: 0 | Refills: 0 | Status: COMPLETED | OUTPATIENT
Start: 2018-01-01 | End: 2018-01-01

## 2018-01-01 RX ORDER — METRONIDAZOLE 500 MG
TABLET ORAL
Qty: 0 | Refills: 0 | Status: DISCONTINUED | OUTPATIENT
Start: 2018-01-01 | End: 2018-01-01

## 2018-01-01 RX ORDER — VASOPRESSIN 20 [USP'U]/ML
0.04 INJECTION INTRAVENOUS
Qty: 100 | Refills: 0 | Status: DISCONTINUED | OUTPATIENT
Start: 2018-01-01 | End: 2018-01-01

## 2018-01-01 RX ORDER — MAGNESIUM SULFATE 500 MG/ML
2 VIAL (ML) INJECTION ONCE
Qty: 0 | Refills: 0 | Status: DISCONTINUED | OUTPATIENT
Start: 2018-01-01 | End: 2018-01-01

## 2018-01-01 RX ORDER — CEFTRIAXONE 500 MG/1
2 INJECTION, POWDER, FOR SOLUTION INTRAMUSCULAR; INTRAVENOUS EVERY 12 HOURS
Qty: 0 | Refills: 0 | Status: DISCONTINUED | OUTPATIENT
Start: 2018-01-01 | End: 2018-01-01

## 2018-01-01 RX ORDER — MIDAZOLAM HYDROCHLORIDE 1 MG/ML
4 INJECTION, SOLUTION INTRAMUSCULAR; INTRAVENOUS ONCE
Qty: 0 | Refills: 0 | Status: DISCONTINUED | OUTPATIENT
Start: 2018-01-01 | End: 2018-01-01

## 2018-01-01 RX ORDER — FENTANYL CITRATE 50 UG/ML
100 INJECTION INTRAVENOUS ONCE
Qty: 0 | Refills: 0 | Status: DISCONTINUED | OUTPATIENT
Start: 2018-01-01 | End: 2018-01-01

## 2018-01-01 RX ORDER — METRONIDAZOLE 500 MG
500 TABLET ORAL EVERY 8 HOURS
Qty: 0 | Refills: 0 | Status: DISCONTINUED | OUTPATIENT
Start: 2018-01-01 | End: 2018-01-01

## 2018-01-01 RX ORDER — CLOPIDOGREL BISULFATE 75 MG/1
300 TABLET, FILM COATED ORAL ONCE
Qty: 0 | Refills: 0 | Status: COMPLETED | OUTPATIENT
Start: 2018-01-01 | End: 2018-01-01

## 2018-01-01 RX ORDER — GLUCAGON INJECTION, SOLUTION 0.5 MG/.1ML
1 INJECTION, SOLUTION SUBCUTANEOUS ONCE
Qty: 0 | Refills: 0 | Status: DISCONTINUED | OUTPATIENT
Start: 2018-01-01 | End: 2018-01-01

## 2018-01-01 RX ORDER — VANCOMYCIN HCL 1 G
750 VIAL (EA) INTRAVENOUS EVERY 24 HOURS
Qty: 0 | Refills: 0 | Status: DISCONTINUED | OUTPATIENT
Start: 2018-01-01 | End: 2018-01-01

## 2018-01-01 RX ORDER — CHLORHEXIDINE GLUCONATE 213 G/1000ML
1 SOLUTION TOPICAL
Qty: 0 | Refills: 0 | Status: DISCONTINUED | OUTPATIENT
Start: 2018-01-01 | End: 2018-01-01

## 2018-01-01 RX ORDER — MEROPENEM 1 G/30ML
1000 INJECTION INTRAVENOUS EVERY 12 HOURS
Qty: 0 | Refills: 0 | Status: DISCONTINUED | OUTPATIENT
Start: 2018-01-01 | End: 2018-01-01

## 2018-01-01 RX ORDER — VANCOMYCIN HCL 1 G
750 VIAL (EA) INTRAVENOUS EVERY 12 HOURS
Qty: 0 | Refills: 0 | Status: DISCONTINUED | OUTPATIENT
Start: 2018-01-01 | End: 2018-01-01

## 2018-01-01 RX ORDER — KETAMINE HYDROCHLORIDE 100 MG/ML
70 INJECTION INTRAMUSCULAR; INTRAVENOUS ONCE
Qty: 0 | Refills: 0 | Status: DISCONTINUED | OUTPATIENT
Start: 2018-01-01 | End: 2018-01-01

## 2018-01-01 RX ORDER — DEXTROSE 50 % IN WATER 50 %
25 SYRINGE (ML) INTRAVENOUS ONCE
Qty: 0 | Refills: 0 | Status: DISCONTINUED | OUTPATIENT
Start: 2018-01-01 | End: 2018-01-01

## 2018-01-01 RX ORDER — THIAMINE MONONITRATE (VIT B1) 100 MG
200 TABLET ORAL
Qty: 0 | Refills: 0 | Status: COMPLETED | OUTPATIENT
Start: 2018-01-01 | End: 2018-01-01

## 2018-01-01 RX ORDER — FENTANYL CITRATE 50 UG/ML
0.5 INJECTION INTRAVENOUS
Qty: 2500 | Refills: 0 | Status: DISCONTINUED | OUTPATIENT
Start: 2018-01-01 | End: 2018-01-01

## 2018-01-01 RX ORDER — PANTOPRAZOLE SODIUM 20 MG/1
40 TABLET, DELAYED RELEASE ORAL
Qty: 0 | Refills: 0 | Status: DISCONTINUED | OUTPATIENT
Start: 2018-01-01 | End: 2018-01-01

## 2018-01-01 RX ORDER — INSULIN LISPRO 100/ML
VIAL (ML) SUBCUTANEOUS
Qty: 0 | Refills: 0 | Status: DISCONTINUED | OUTPATIENT
Start: 2018-01-01 | End: 2018-01-01

## 2018-01-01 RX ORDER — MAGNESIUM SULFATE 500 MG/ML
1 VIAL (ML) INJECTION ONCE
Qty: 0 | Refills: 0 | Status: COMPLETED | OUTPATIENT
Start: 2018-01-01 | End: 2018-01-01

## 2018-01-01 RX ORDER — PIPERACILLIN AND TAZOBACTAM 4; .5 G/20ML; G/20ML
3.38 INJECTION, POWDER, LYOPHILIZED, FOR SOLUTION INTRAVENOUS EVERY 8 HOURS
Qty: 0 | Refills: 0 | Status: DISCONTINUED | OUTPATIENT
Start: 2018-01-01 | End: 2018-01-01

## 2018-01-01 RX ORDER — INSULIN LISPRO 100/ML
VIAL (ML) SUBCUTANEOUS EVERY 6 HOURS
Qty: 0 | Refills: 0 | Status: DISCONTINUED | OUTPATIENT
Start: 2018-01-01 | End: 2018-01-01

## 2018-01-01 RX ORDER — HEPARIN SODIUM 5000 [USP'U]/ML
6500 INJECTION INTRAVENOUS; SUBCUTANEOUS EVERY 6 HOURS
Qty: 0 | Refills: 0 | Status: DISCONTINUED | OUTPATIENT
Start: 2018-01-01 | End: 2018-01-01

## 2018-01-01 RX ADMIN — Medication 1: at 23:46

## 2018-01-01 RX ADMIN — MIDODRINE HYDROCHLORIDE 10 MILLIGRAM(S): 2.5 TABLET ORAL at 16:58

## 2018-01-01 RX ADMIN — Medication 3: at 11:37

## 2018-01-01 RX ADMIN — ALBUTEROL 2.5 MILLIGRAM(S): 90 AEROSOL, METERED ORAL at 13:07

## 2018-01-01 RX ADMIN — ALBUTEROL 2.5 MILLIGRAM(S): 90 AEROSOL, METERED ORAL at 05:41

## 2018-01-01 RX ADMIN — Medication 650 MILLIGRAM(S): at 15:45

## 2018-01-01 RX ADMIN — Medication 103 MILLIGRAM(S): at 23:48

## 2018-01-01 RX ADMIN — Medication 13.11 MICROGRAM(S)/KG/MIN: at 16:58

## 2018-01-01 RX ADMIN — ALBUTEROL 2.5 MILLIGRAM(S): 90 AEROSOL, METERED ORAL at 00:29

## 2018-01-01 RX ADMIN — Medication 103 MILLIGRAM(S): at 05:19

## 2018-01-01 RX ADMIN — FLUCONAZOLE 50 MILLIGRAM(S): 150 TABLET ORAL at 19:55

## 2018-01-01 RX ADMIN — MIDODRINE HYDROCHLORIDE 10 MILLIGRAM(S): 2.5 TABLET ORAL at 13:42

## 2018-01-01 RX ADMIN — Medication 103 MILLIGRAM(S): at 18:33

## 2018-01-01 RX ADMIN — Medication 102 MILLIGRAM(S): at 11:05

## 2018-01-01 RX ADMIN — PROPOFOL 2.3 MICROGRAM(S)/KG/MIN: 10 INJECTION, EMULSION INTRAVENOUS at 04:04

## 2018-01-01 RX ADMIN — Medication 103 MILLIGRAM(S): at 00:10

## 2018-01-01 RX ADMIN — Medication 50 MILLIGRAM(S): at 05:27

## 2018-01-01 RX ADMIN — SODIUM CHLORIDE 2000 MILLILITER(S): 9 INJECTION INTRAMUSCULAR; INTRAVENOUS; SUBCUTANEOUS at 20:42

## 2018-01-01 RX ADMIN — CHLORHEXIDINE GLUCONATE 15 MILLILITER(S): 213 SOLUTION TOPICAL at 18:31

## 2018-01-01 RX ADMIN — Medication 103 MILLIGRAM(S): at 12:47

## 2018-01-01 RX ADMIN — CHLORHEXIDINE GLUCONATE 1 APPLICATION(S): 213 SOLUTION TOPICAL at 16:18

## 2018-01-01 RX ADMIN — Medication 250 MILLIGRAM(S): at 01:37

## 2018-01-01 RX ADMIN — Medication 50 MILLIGRAM(S): at 12:07

## 2018-01-01 RX ADMIN — Medication 103 MILLIGRAM(S): at 18:19

## 2018-01-01 RX ADMIN — Medication 250 MILLIGRAM(S): at 04:03

## 2018-01-01 RX ADMIN — Medication 3 MILLILITER(S): at 03:16

## 2018-01-01 RX ADMIN — Medication 15 MILLIGRAM(S): at 18:31

## 2018-01-01 RX ADMIN — CHLORHEXIDINE GLUCONATE 15 MILLILITER(S): 213 SOLUTION TOPICAL at 05:18

## 2018-01-01 RX ADMIN — Medication 650 MILLIGRAM(S): at 09:30

## 2018-01-01 RX ADMIN — PIPERACILLIN AND TAZOBACTAM 25 GRAM(S): 4; .5 INJECTION, POWDER, LYOPHILIZED, FOR SOLUTION INTRAVENOUS at 21:29

## 2018-01-01 RX ADMIN — SODIUM CHLORIDE 2000 MILLILITER(S): 9 INJECTION INTRAMUSCULAR; INTRAVENOUS; SUBCUTANEOUS at 21:00

## 2018-01-01 RX ADMIN — ALBUTEROL 2.5 MILLIGRAM(S): 90 AEROSOL, METERED ORAL at 05:15

## 2018-01-01 RX ADMIN — Medication 200 GRAM(S): at 16:30

## 2018-01-01 RX ADMIN — SODIUM CHLORIDE 2000 MILLILITER(S): 9 INJECTION INTRAMUSCULAR; INTRAVENOUS; SUBCUTANEOUS at 00:00

## 2018-01-01 RX ADMIN — CEFTRIAXONE 100 GRAM(S): 500 INJECTION, POWDER, FOR SOLUTION INTRAMUSCULAR; INTRAVENOUS at 21:53

## 2018-01-01 RX ADMIN — MIDODRINE HYDROCHLORIDE 10 MILLIGRAM(S): 2.5 TABLET ORAL at 05:26

## 2018-01-01 RX ADMIN — AMIODARONE HYDROCHLORIDE 33.33 MG/MIN: 400 TABLET ORAL at 16:00

## 2018-01-01 RX ADMIN — AMIODARONE HYDROCHLORIDE 33.33 MG/MIN: 400 TABLET ORAL at 14:40

## 2018-01-01 RX ADMIN — Medication 50 MILLIGRAM(S): at 05:25

## 2018-01-01 RX ADMIN — CHLORHEXIDINE GLUCONATE 15 MILLILITER(S): 213 SOLUTION TOPICAL at 18:19

## 2018-01-01 RX ADMIN — Medication 50 MILLIGRAM(S): at 23:27

## 2018-01-01 RX ADMIN — DEXMEDETOMIDINE HYDROCHLORIDE IN 0.9% SODIUM CHLORIDE 13.4 MICROGRAM(S)/KG/HR: 4 INJECTION INTRAVENOUS at 04:50

## 2018-01-01 RX ADMIN — Medication 103 MILLIGRAM(S): at 05:51

## 2018-01-01 RX ADMIN — Medication 50 MILLIGRAM(S): at 12:35

## 2018-01-01 RX ADMIN — FENTANYL CITRATE 50 MICROGRAM(S): 50 INJECTION INTRAVENOUS at 14:22

## 2018-01-01 RX ADMIN — Medication 102 MILLIGRAM(S): at 22:45

## 2018-01-01 RX ADMIN — CHLORHEXIDINE GLUCONATE 15 MILLILITER(S): 213 SOLUTION TOPICAL at 17:12

## 2018-01-01 RX ADMIN — Medication 1: at 22:47

## 2018-01-01 RX ADMIN — Medication 50 MILLIEQUIVALENT(S): at 07:25

## 2018-01-01 RX ADMIN — Medication 100 MILLIGRAM(S): at 07:25

## 2018-01-01 RX ADMIN — Medication 50 MILLIGRAM(S): at 23:00

## 2018-01-01 RX ADMIN — Medication 50 GRAM(S): at 11:36

## 2018-01-01 RX ADMIN — Medication 100 MILLIGRAM(S): at 13:41

## 2018-01-01 RX ADMIN — SODIUM CHLORIDE 1000 MILLILITER(S): 9 INJECTION INTRAMUSCULAR; INTRAVENOUS; SUBCUTANEOUS at 06:11

## 2018-01-01 RX ADMIN — CLOPIDOGREL BISULFATE 300 MILLIGRAM(S): 75 TABLET, FILM COATED ORAL at 15:02

## 2018-01-01 RX ADMIN — Medication 102 MILLIGRAM(S): at 12:47

## 2018-01-01 RX ADMIN — Medication 4: at 16:58

## 2018-01-01 RX ADMIN — PIPERACILLIN AND TAZOBACTAM 25 GRAM(S): 4; .5 INJECTION, POWDER, LYOPHILIZED, FOR SOLUTION INTRAVENOUS at 13:40

## 2018-01-01 RX ADMIN — ENOXAPARIN SODIUM 40 MILLIGRAM(S): 100 INJECTION SUBCUTANEOUS at 06:33

## 2018-01-01 RX ADMIN — Medication 100 MILLIGRAM(S): at 11:17

## 2018-01-01 RX ADMIN — Medication 3: at 07:02

## 2018-01-01 RX ADMIN — FENTANYL CITRATE 100 MICROGRAM(S): 50 INJECTION INTRAVENOUS at 12:01

## 2018-01-01 RX ADMIN — Medication 200 GRAM(S): at 11:48

## 2018-01-01 RX ADMIN — PROPOFOL 2.3 MICROGRAM(S)/KG/MIN: 10 INJECTION, EMULSION INTRAVENOUS at 16:17

## 2018-01-01 RX ADMIN — ALBUTEROL 2.5 MILLIGRAM(S): 90 AEROSOL, METERED ORAL at 00:52

## 2018-01-01 RX ADMIN — PROPOFOL 2.3 MICROGRAM(S)/KG/MIN: 10 INJECTION, EMULSION INTRAVENOUS at 01:54

## 2018-01-01 RX ADMIN — Medication 50 MILLIGRAM(S): at 11:51

## 2018-01-01 RX ADMIN — Medication 40 MILLIEQUIVALENT(S): at 06:33

## 2018-01-01 RX ADMIN — Medication 50 MILLIEQUIVALENT(S): at 06:09

## 2018-01-01 RX ADMIN — Medication 975 MILLIGRAM(S): at 23:19

## 2018-01-01 RX ADMIN — Medication 600 MILLIGRAM(S): at 01:10

## 2018-01-01 RX ADMIN — Medication 102 MILLIGRAM(S): at 21:05

## 2018-01-01 RX ADMIN — MIDODRINE HYDROCHLORIDE 10 MILLIGRAM(S): 2.5 TABLET ORAL at 21:54

## 2018-01-01 RX ADMIN — Medication 5 MILLIGRAM(S): at 11:51

## 2018-01-01 RX ADMIN — FENTANYL CITRATE 100 MICROGRAM(S): 50 INJECTION INTRAVENOUS at 04:27

## 2018-01-01 RX ADMIN — Medication 650 MILLIGRAM(S): at 20:55

## 2018-01-01 RX ADMIN — Medication 13.11 MICROGRAM(S)/KG/MIN: at 13:44

## 2018-01-01 RX ADMIN — Medication 2 MILLIGRAM(S): at 22:50

## 2018-01-01 RX ADMIN — Medication 40 MILLIEQUIVALENT(S): at 13:43

## 2018-01-01 RX ADMIN — Medication 13.11 MICROGRAM(S)/KG/MIN: at 06:59

## 2018-01-01 RX ADMIN — AMIODARONE HYDROCHLORIDE 618 MILLIGRAM(S): 400 TABLET ORAL at 09:36

## 2018-01-01 RX ADMIN — Medication 975 MILLIGRAM(S): at 20:42

## 2018-01-01 RX ADMIN — Medication 1: at 23:19

## 2018-01-01 RX ADMIN — ONDANSETRON 4 MILLIGRAM(S): 8 TABLET, FILM COATED ORAL at 20:42

## 2018-01-01 RX ADMIN — MIDODRINE HYDROCHLORIDE 10 MILLIGRAM(S): 2.5 TABLET ORAL at 06:59

## 2018-01-01 RX ADMIN — FENTANYL CITRATE 100 MICROGRAM(S): 50 INJECTION INTRAVENOUS at 12:15

## 2018-01-01 RX ADMIN — CHLORHEXIDINE GLUCONATE 1 APPLICATION(S): 213 SOLUTION TOPICAL at 05:18

## 2018-01-01 RX ADMIN — SODIUM CHLORIDE 2000 MILLILITER(S): 9 INJECTION, SOLUTION INTRAVENOUS at 04:01

## 2018-01-01 RX ADMIN — ALBUTEROL 2.5 MILLIGRAM(S): 90 AEROSOL, METERED ORAL at 17:10

## 2018-01-01 RX ADMIN — Medication 650 MILLIGRAM(S): at 01:40

## 2018-01-01 RX ADMIN — Medication 50 MILLIGRAM(S): at 12:45

## 2018-01-01 RX ADMIN — ALBUTEROL 2.5 MILLIGRAM(S): 90 AEROSOL, METERED ORAL at 00:21

## 2018-01-01 RX ADMIN — Medication 3: at 11:55

## 2018-01-01 RX ADMIN — MIDODRINE HYDROCHLORIDE 10 MILLIGRAM(S): 2.5 TABLET ORAL at 22:53

## 2018-01-01 RX ADMIN — MIDODRINE HYDROCHLORIDE 10 MILLIGRAM(S): 2.5 TABLET ORAL at 06:03

## 2018-01-01 RX ADMIN — Medication 50 MILLIGRAM(S): at 16:01

## 2018-01-01 RX ADMIN — ALBUTEROL 2.5 MILLIGRAM(S): 90 AEROSOL, METERED ORAL at 11:19

## 2018-01-01 RX ADMIN — Medication 100 MILLIGRAM(S): at 13:49

## 2018-01-01 RX ADMIN — FLUCONAZOLE 50 MILLIGRAM(S): 150 TABLET ORAL at 14:30

## 2018-01-01 RX ADMIN — Medication 50 MILLIGRAM(S): at 15:58

## 2018-01-01 RX ADMIN — CHLORHEXIDINE GLUCONATE 1 APPLICATION(S): 213 SOLUTION TOPICAL at 06:03

## 2018-01-01 RX ADMIN — Medication 13.11 MICROGRAM(S)/KG/MIN: at 18:58

## 2018-01-01 RX ADMIN — CEFTRIAXONE 100 GRAM(S): 500 INJECTION, POWDER, FOR SOLUTION INTRAMUSCULAR; INTRAVENOUS at 21:36

## 2018-01-01 RX ADMIN — Medication 50 MILLIGRAM(S): at 09:10

## 2018-01-01 RX ADMIN — Medication 71.81 MICROGRAM(S)/KG/MIN: at 02:35

## 2018-01-01 RX ADMIN — Medication 71.81 MICROGRAM(S)/KG/MIN: at 23:21

## 2018-01-01 RX ADMIN — Medication 75 MEQ/KG/HR: at 07:39

## 2018-01-01 RX ADMIN — VASOPRESSIN 2.4 UNIT(S)/MIN: 20 INJECTION INTRAVENOUS at 01:00

## 2018-01-01 RX ADMIN — Medication 3: at 00:26

## 2018-01-01 RX ADMIN — CEFTRIAXONE 100 GRAM(S): 500 INJECTION, POWDER, FOR SOLUTION INTRAMUSCULAR; INTRAVENOUS at 09:10

## 2018-01-01 RX ADMIN — Medication 100 MILLIGRAM(S): at 21:54

## 2018-01-01 RX ADMIN — Medication 103 MILLIGRAM(S): at 00:08

## 2018-01-01 RX ADMIN — Medication 3: at 17:11

## 2018-01-01 RX ADMIN — ALBUTEROL 2.5 MILLIGRAM(S): 90 AEROSOL, METERED ORAL at 05:26

## 2018-01-01 RX ADMIN — Medication 1: at 05:37

## 2018-01-01 RX ADMIN — Medication 103 MILLIGRAM(S): at 12:35

## 2018-01-01 RX ADMIN — FENTANYL CITRATE 50 MICROGRAM(S): 50 INJECTION INTRAVENOUS at 13:50

## 2018-01-01 RX ADMIN — MIDODRINE HYDROCHLORIDE 10 MILLIGRAM(S): 2.5 TABLET ORAL at 05:18

## 2018-01-01 RX ADMIN — MIDODRINE HYDROCHLORIDE 10 MILLIGRAM(S): 2.5 TABLET ORAL at 05:25

## 2018-01-01 RX ADMIN — Medication 650 MILLIGRAM(S): at 14:44

## 2018-01-01 RX ADMIN — Medication 11.49 MICROGRAM(S)/KG/MIN: at 09:24

## 2018-01-01 RX ADMIN — FENTANYL CITRATE 0.77 MICROGRAM(S)/KG/HR: 50 INJECTION INTRAVENOUS at 04:46

## 2018-01-01 RX ADMIN — FLUCONAZOLE 50 MILLIGRAM(S): 150 TABLET ORAL at 15:01

## 2018-01-01 RX ADMIN — SODIUM CHLORIDE 2000 MILLILITER(S): 9 INJECTION INTRAMUSCULAR; INTRAVENOUS; SUBCUTANEOUS at 04:54

## 2018-01-01 RX ADMIN — ALBUTEROL 2.5 MILLIGRAM(S): 90 AEROSOL, METERED ORAL at 11:52

## 2018-01-01 RX ADMIN — Medication 50 MILLILITER(S): at 23:40

## 2018-01-01 RX ADMIN — Medication 100 MILLIEQUIVALENT(S): at 02:53

## 2018-01-01 RX ADMIN — MIDODRINE HYDROCHLORIDE 10 MILLIGRAM(S): 2.5 TABLET ORAL at 13:50

## 2018-01-01 RX ADMIN — Medication 71.81 MICROGRAM(S)/KG/MIN: at 08:36

## 2018-01-01 RX ADMIN — FENTANYL CITRATE 0.77 MICROGRAM(S)/KG/HR: 50 INJECTION INTRAVENOUS at 18:20

## 2018-01-01 RX ADMIN — Medication 100 MILLIGRAM(S): at 06:03

## 2018-01-01 RX ADMIN — Medication 102 MILLIGRAM(S): at 01:00

## 2018-01-01 RX ADMIN — Medication 1: at 21:30

## 2018-01-01 RX ADMIN — CEFTRIAXONE 100 GRAM(S): 500 INJECTION, POWDER, FOR SOLUTION INTRAMUSCULAR; INTRAVENOUS at 11:12

## 2018-01-01 RX ADMIN — Medication 650 MILLIGRAM(S): at 03:05

## 2018-01-01 RX ADMIN — PIPERACILLIN AND TAZOBACTAM 200 GRAM(S): 4; .5 INJECTION, POWDER, LYOPHILIZED, FOR SOLUTION INTRAVENOUS at 00:50

## 2018-01-01 RX ADMIN — Medication 650 MILLIGRAM(S): at 03:00

## 2018-01-01 RX ADMIN — Medication 100 MILLIGRAM(S): at 22:51

## 2018-01-01 RX ADMIN — Medication 71.81 MICROGRAM(S)/KG/MIN: at 08:27

## 2018-01-01 RX ADMIN — Medication 50 MILLIGRAM(S): at 18:31

## 2018-01-01 RX ADMIN — Medication 102 MILLIGRAM(S): at 09:37

## 2018-01-01 RX ADMIN — Medication 104 MILLIGRAM(S): at 08:36

## 2018-01-01 RX ADMIN — Medication 103 MILLIGRAM(S): at 12:45

## 2018-01-01 RX ADMIN — ALBUTEROL 2.5 MILLIGRAM(S): 90 AEROSOL, METERED ORAL at 05:25

## 2018-01-01 RX ADMIN — Medication 200 GRAM(S): at 12:07

## 2018-01-01 RX ADMIN — VASOPRESSIN 2.4 UNIT(S)/MIN: 20 INJECTION INTRAVENOUS at 04:50

## 2018-01-01 RX ADMIN — Medication 2: at 23:29

## 2018-01-01 RX ADMIN — CHLORHEXIDINE GLUCONATE 15 MILLILITER(S): 213 SOLUTION TOPICAL at 05:26

## 2018-01-01 RX ADMIN — Medication 50 MILLIGRAM(S): at 02:01

## 2018-01-01 RX ADMIN — Medication 40 MILLIEQUIVALENT(S): at 09:45

## 2018-01-01 RX ADMIN — Medication 100 MEQ/KG/HR: at 00:08

## 2018-01-01 RX ADMIN — MIDODRINE HYDROCHLORIDE 10 MILLIGRAM(S): 2.5 TABLET ORAL at 21:29

## 2018-01-01 RX ADMIN — Medication 50 MILLIEQUIVALENT(S): at 07:38

## 2018-01-01 RX ADMIN — ALBUTEROL 2.5 MILLIGRAM(S): 90 AEROSOL, METERED ORAL at 18:30

## 2018-01-01 RX ADMIN — CHLORHEXIDINE GLUCONATE 15 MILLILITER(S): 213 SOLUTION TOPICAL at 06:59

## 2018-01-01 RX ADMIN — Medication 100 MILLIGRAM(S): at 05:25

## 2018-01-01 RX ADMIN — CEFTRIAXONE 100 GRAM(S): 500 INJECTION, POWDER, FOR SOLUTION INTRAMUSCULAR; INTRAVENOUS at 22:51

## 2018-01-01 RX ADMIN — Medication 13.11 MICROGRAM(S)/KG/MIN: at 22:41

## 2018-01-01 RX ADMIN — MIDAZOLAM HYDROCHLORIDE 4 MILLIGRAM(S): 1 INJECTION, SOLUTION INTRAMUSCULAR; INTRAVENOUS at 04:01

## 2018-01-01 RX ADMIN — Medication 71.81 MICROGRAM(S)/KG/MIN: at 17:00

## 2018-01-01 RX ADMIN — Medication 50 MILLIGRAM(S): at 14:37

## 2018-01-01 RX ADMIN — Medication 650 MILLIGRAM(S): at 13:30

## 2018-01-01 RX ADMIN — Medication 13.11 MICROGRAM(S)/KG/MIN: at 04:51

## 2018-01-01 RX ADMIN — Medication 102 MILLIGRAM(S): at 08:30

## 2018-01-01 RX ADMIN — MEROPENEM 100 MILLIGRAM(S): 1 INJECTION INTRAVENOUS at 03:59

## 2018-01-01 RX ADMIN — CEFTRIAXONE 100 GRAM(S): 500 INJECTION, POWDER, FOR SOLUTION INTRAMUSCULAR; INTRAVENOUS at 11:55

## 2018-01-01 RX ADMIN — PANTOPRAZOLE SODIUM 40 MILLIGRAM(S): 20 TABLET, DELAYED RELEASE ORAL at 14:45

## 2018-01-01 RX ADMIN — MIDODRINE HYDROCHLORIDE 10 MILLIGRAM(S): 2.5 TABLET ORAL at 21:36

## 2018-01-01 RX ADMIN — Medication 250 MILLIGRAM(S): at 02:02

## 2018-01-01 RX ADMIN — Medication 103 MILLIGRAM(S): at 14:36

## 2018-01-01 RX ADMIN — HEPARIN SODIUM 1400 UNIT(S)/HR: 5000 INJECTION INTRAVENOUS; SUBCUTANEOUS at 14:05

## 2018-01-01 RX ADMIN — Medication 13.11 MICROGRAM(S)/KG/MIN: at 21:03

## 2018-01-01 RX ADMIN — PANTOPRAZOLE SODIUM 40 MILLIGRAM(S): 20 TABLET, DELAYED RELEASE ORAL at 12:25

## 2018-01-01 RX ADMIN — CEFTRIAXONE 100 GRAM(S): 500 INJECTION, POWDER, FOR SOLUTION INTRAMUSCULAR; INTRAVENOUS at 10:36

## 2018-01-01 RX ADMIN — Medication 100 MILLIGRAM(S): at 15:00

## 2018-01-01 RX ADMIN — FENTANYL CITRATE 0.77 MICROGRAM(S)/KG/HR: 50 INJECTION INTRAVENOUS at 22:51

## 2018-01-01 RX ADMIN — CEFTRIAXONE 100 GRAM(S): 500 INJECTION, POWDER, FOR SOLUTION INTRAMUSCULAR; INTRAVENOUS at 09:49

## 2018-01-01 RX ADMIN — Medication 103 MILLIGRAM(S): at 23:56

## 2018-01-01 RX ADMIN — ALBUTEROL 2.5 MILLIGRAM(S): 90 AEROSOL, METERED ORAL at 18:33

## 2018-01-01 RX ADMIN — Medication 10 MILLIGRAM(S): at 17:17

## 2018-01-01 RX ADMIN — Medication 71.81 MICROGRAM(S)/KG/MIN: at 22:51

## 2018-01-01 RX ADMIN — MIDODRINE HYDROCHLORIDE 10 MILLIGRAM(S): 2.5 TABLET ORAL at 15:00

## 2018-01-01 RX ADMIN — Medication 3 MILLIGRAM(S): at 02:06

## 2018-01-01 RX ADMIN — Medication 2 MILLIGRAM(S): at 21:32

## 2018-01-01 RX ADMIN — Medication 50 MILLIGRAM(S): at 21:53

## 2018-01-01 RX ADMIN — PROPOFOL 2.3 MICROGRAM(S)/KG/MIN: 10 INJECTION, EMULSION INTRAVENOUS at 06:58

## 2018-01-01 RX ADMIN — Medication 13.11 MICROGRAM(S)/KG/MIN: at 11:37

## 2018-01-01 RX ADMIN — Medication 1: at 13:18

## 2018-01-01 RX ADMIN — FENTANYL CITRATE 50 MICROGRAM(S): 50 INJECTION INTRAVENOUS at 17:45

## 2018-01-01 RX ADMIN — FENTANYL CITRATE 0.77 MICROGRAM(S)/KG/HR: 50 INJECTION INTRAVENOUS at 09:37

## 2018-01-01 RX ADMIN — FENTANYL CITRATE 50 MICROGRAM(S): 50 INJECTION INTRAVENOUS at 18:00

## 2018-01-01 RX ADMIN — PANTOPRAZOLE SODIUM 40 MILLIGRAM(S): 20 TABLET, DELAYED RELEASE ORAL at 17:11

## 2018-01-01 RX ADMIN — MIDODRINE HYDROCHLORIDE 10 MILLIGRAM(S): 2.5 TABLET ORAL at 22:45

## 2018-01-01 RX ADMIN — ALBUTEROL 2.5 MILLIGRAM(S): 90 AEROSOL, METERED ORAL at 11:40

## 2018-01-01 RX ADMIN — Medication 71.81 MICROGRAM(S)/KG/MIN: at 00:09

## 2018-01-01 RX ADMIN — PANTOPRAZOLE SODIUM 40 MILLIGRAM(S): 20 TABLET, DELAYED RELEASE ORAL at 12:47

## 2018-01-01 RX ADMIN — PIPERACILLIN AND TAZOBACTAM 3.38 GRAM(S): 4; .5 INJECTION, POWDER, LYOPHILIZED, FOR SOLUTION INTRAVENOUS at 01:36

## 2018-01-01 RX ADMIN — Medication 71.81 MICROGRAM(S)/KG/MIN: at 12:45

## 2018-01-01 RX ADMIN — Medication 50 MILLIGRAM(S): at 05:18

## 2018-01-01 RX ADMIN — Medication 100 MEQ/KG/HR: at 04:00

## 2018-01-01 RX ADMIN — Medication 71.81 MICROGRAM(S)/KG/MIN: at 08:25

## 2018-01-01 RX ADMIN — CHLORHEXIDINE GLUCONATE 15 MILLILITER(S): 213 SOLUTION TOPICAL at 18:36

## 2018-01-01 RX ADMIN — SODIUM CHLORIDE 2000 MILLILITER(S): 9 INJECTION, SOLUTION INTRAVENOUS at 02:53

## 2018-01-01 RX ADMIN — Medication 103 MILLIGRAM(S): at 17:12

## 2018-01-01 RX ADMIN — Medication 600 MILLIGRAM(S): at 02:00

## 2018-01-01 RX ADMIN — PROPOFOL 2.3 MICROGRAM(S)/KG/MIN: 10 INJECTION, EMULSION INTRAVENOUS at 06:42

## 2018-01-01 RX ADMIN — ALBUTEROL 2.5 MILLIGRAM(S): 90 AEROSOL, METERED ORAL at 23:06

## 2018-01-01 RX ADMIN — CHLORHEXIDINE GLUCONATE 15 MILLILITER(S): 213 SOLUTION TOPICAL at 06:02

## 2018-01-01 RX ADMIN — Medication 102 MILLIGRAM(S): at 10:05

## 2018-01-01 RX ADMIN — ALBUTEROL 2.5 MILLIGRAM(S): 90 AEROSOL, METERED ORAL at 17:48

## 2018-01-01 RX ADMIN — ALBUTEROL 2.5 MILLIGRAM(S): 90 AEROSOL, METERED ORAL at 05:35

## 2018-01-01 RX ADMIN — CHLORHEXIDINE GLUCONATE 1 APPLICATION(S): 213 SOLUTION TOPICAL at 05:28

## 2018-01-01 RX ADMIN — Medication 650 MILLIGRAM(S): at 12:31

## 2018-01-01 RX ADMIN — Medication 103 MILLIGRAM(S): at 19:56

## 2018-01-01 RX ADMIN — Medication 50 MILLIGRAM(S): at 23:36

## 2018-01-01 RX ADMIN — FENTANYL CITRATE 0.77 MICROGRAM(S)/KG/HR: 50 INJECTION INTRAVENOUS at 14:30

## 2018-01-01 RX ADMIN — Medication 650 MILLIGRAM(S): at 08:25

## 2018-01-01 RX ADMIN — FENTANYL CITRATE 100 MICROGRAM(S): 50 INJECTION INTRAVENOUS at 04:12

## 2018-01-01 RX ADMIN — Medication 71.81 MICROGRAM(S)/KG/MIN: at 04:37

## 2018-01-01 RX ADMIN — PIPERACILLIN AND TAZOBACTAM 25 GRAM(S): 4; .5 INJECTION, POWDER, LYOPHILIZED, FOR SOLUTION INTRAVENOUS at 06:40

## 2018-01-01 RX ADMIN — CHLORHEXIDINE GLUCONATE 15 MILLILITER(S): 213 SOLUTION TOPICAL at 05:25

## 2018-01-01 RX ADMIN — Medication 2: at 18:32

## 2018-01-01 RX ADMIN — MIDODRINE HYDROCHLORIDE 10 MILLIGRAM(S): 2.5 TABLET ORAL at 14:44

## 2018-01-01 RX ADMIN — Medication 50 MILLILITER(S): at 13:08

## 2018-01-01 RX ADMIN — Medication 1: at 06:47

## 2018-01-01 RX ADMIN — Medication 100 MILLIGRAM(S): at 06:59

## 2018-01-01 RX ADMIN — Medication 100 GRAM(S): at 11:55

## 2018-01-01 RX ADMIN — Medication 650 MILLIGRAM(S): at 22:26

## 2018-01-01 RX ADMIN — SODIUM CHLORIDE 2000 MILLILITER(S): 9 INJECTION INTRAMUSCULAR; INTRAVENOUS; SUBCUTANEOUS at 23:00

## 2018-01-01 RX ADMIN — Medication 1: at 12:37

## 2018-01-01 RX ADMIN — Medication 50 MILLIGRAM(S): at 17:11

## 2018-01-01 RX ADMIN — Medication 4: at 06:11

## 2018-01-01 RX ADMIN — CHLORHEXIDINE GLUCONATE 1 APPLICATION(S): 213 SOLUTION TOPICAL at 05:25

## 2018-01-01 RX ADMIN — Medication 103 MILLIGRAM(S): at 06:04

## 2018-01-01 RX ADMIN — Medication 100 MEQ/KG/HR: at 08:30

## 2018-01-01 RX ADMIN — PANTOPRAZOLE SODIUM 40 MILLIGRAM(S): 20 TABLET, DELAYED RELEASE ORAL at 14:36

## 2018-01-01 RX ADMIN — ALBUTEROL 2.5 MILLIGRAM(S): 90 AEROSOL, METERED ORAL at 01:37

## 2018-01-01 RX ADMIN — AMIODARONE HYDROCHLORIDE 33.33 MG/MIN: 400 TABLET ORAL at 10:00

## 2018-01-01 RX ADMIN — SODIUM CHLORIDE 75 MILLILITER(S): 9 INJECTION, SOLUTION INTRAVENOUS at 00:49

## 2018-01-01 RX ADMIN — Medication 102 MILLIGRAM(S): at 21:00

## 2018-01-01 RX ADMIN — Medication 13.11 MICROGRAM(S)/KG/MIN: at 04:30

## 2018-01-01 RX ADMIN — ALBUTEROL 2.5 MILLIGRAM(S): 90 AEROSOL, METERED ORAL at 17:52

## 2018-01-01 RX ADMIN — Medication 71.81 MICROGRAM(S)/KG/MIN: at 18:33

## 2018-01-01 RX ADMIN — Medication 13.11 MICROGRAM(S)/KG/MIN: at 01:14

## 2018-01-01 RX ADMIN — Medication 103 MILLIGRAM(S): at 05:27

## 2018-01-01 RX ADMIN — Medication 650 MILLIGRAM(S): at 00:54

## 2018-01-01 RX ADMIN — Medication 200 GRAM(S): at 06:16

## 2018-01-01 RX ADMIN — Medication 71.81 MICROGRAM(S)/KG/MIN: at 15:49

## 2018-01-01 RX ADMIN — MIDODRINE HYDROCHLORIDE 10 MILLIGRAM(S): 2.5 TABLET ORAL at 14:36

## 2018-01-01 RX ADMIN — Medication 250 MILLIGRAM(S): at 09:36

## 2018-01-01 RX ADMIN — PANTOPRAZOLE SODIUM 40 MILLIGRAM(S): 20 TABLET, DELAYED RELEASE ORAL at 07:38

## 2018-01-01 RX ADMIN — CEFTRIAXONE 100 GRAM(S): 500 INJECTION, POWDER, FOR SOLUTION INTRAMUSCULAR; INTRAVENOUS at 22:48

## 2018-01-01 RX ADMIN — Medication 100 MILLIEQUIVALENT(S): at 07:19

## 2018-01-01 RX ADMIN — Medication 100 MILLIGRAM(S): at 21:29

## 2018-09-30 NOTE — ED PROVIDER NOTE - PROGRESS NOTE DETAILS
Pt noted to be tachy to 170s, after 1L IVF, reports 1 episode hx of Afib after sedation for colonoscopy, no subsequent episodes.  Will give more IVF & cardizem Pt initially admitted to tele, noted lactate increased, pt also hypotensive, consulted ICU

## 2018-09-30 NOTE — ED PROVIDER NOTE - OBJECTIVE STATEMENT
Pertinent PMH/PSH/FHx/SHx and Review of Systems contained within:    71yo F w PMH of HTN, DM, breast ca (s/p lumpectomy, radiation/chemo) Pertinent PMH/PSH/FHx/SHx and Review of Systems contained within:    73yo F w PMH of HTN, DM, breast ca (s/p lumpectomy, radiation/chemo) presents to ED for eval Pertinent PMH/PSH/FHx/SHx and Review of Systems contained within:    71yo F w PMH of HTN, DM, breast ca (s/p lumpectomy, radiation/chemo) presents to ED for eval fo fever, malaise Pertinent PMH/PSH/FHx/SHx and Review of Systems contained within:    73yo F w PMH of HTN, DM, breast ca (s/p lumpectomy, radiation/chemo) presents to ED for eval fo fever, malaise w vomiting & loose stool.  Pt states she noted sx last night, worse today.  Denies known sick contacts, recent travel, rash.    +fever/chills, No photophobia/eye pain/changes in vision, No ear pain, +cough, No abdominal pain, No neck/back pain, no rash, no changes in neurological status/function.

## 2018-09-30 NOTE — ED PROVIDER NOTE - CARE PLAN
Principal Discharge DX:	Fever  Secondary Diagnosis:	Afib Principal Discharge DX:	Fever  Secondary Diagnosis:	Afib  Secondary Diagnosis:	Hypotension

## 2018-09-30 NOTE — ED PROVIDER NOTE - PHYSICAL EXAMINATION
Gen: Alert, NAD  Head: NC, AT, PERRL, EOMI, normal lids/conjunctiva  ENT: Bilateral TM WNL, normal hearing, patent oropharynx without erythema/exudate, uvula midline  Neck: supple, no tenderness/meningismus/JVD, Trachea midline  Pulm: Bilateral clear BS, normal resp effort, no wheeze/stridor/retractions  CV: RRR, no M/R/G, +dist pulses  Abd: soft, NT/ND, +BS, no guarding/rebound tenderness  Mskel: no edema/erythema/cyanosis  Skin: no rash  Neuro: AAOx3, no sensory/motor deficits, CN 2-12 intact Gen: Alert, NAD, speaking in complete sentences  Head: NC, AT, EOMI, normal lids/conjunctiva  ENT: normal hearing, patent oropharynx, MMM  Neck: supple, no tenderness/meningismus/JVD, Trachea midline  Pulm: Bilateral clear BS, normal resp effort, no wheeze/stridor/retractions  CV: tachy, no M/R/G, +dist pulses  Abd: soft, NT/ND, +BS, no guarding/rebound tenderness  Mskel: no edema/erythema/cyanosis  Skin: no rash  Neuro: AAOx3, no sensory/motor deficits

## 2018-09-30 NOTE — ED PROVIDER NOTE - MEDICAL DECISION MAKING DETAILS
Pt w above dx, during ED course, pt w episodes rapid afib, then resolved.  Pt then admitted to tele, noted decr BP, ICU consulted, pt admitted for further eval/mgmt.

## 2018-10-01 NOTE — ED ADULT NURSE REASSESSMENT NOTE - NS ED NURSE REASSESS COMMENT FT1
Pt made aware for the need for urine sample. 1st attempt pt had urgency to go to the bathroom and states she forgot. 2nd attempt pt accidently urinated on self. MD schaefer aware. ICU PA made aware.

## 2018-10-01 NOTE — H&P ADULT - HISTORY OF PRESENT ILLNESS
Called to see 71 Y/O female w/ PMHx of HTN, DM, breast CA (s/p lumpectomy, radiation/chemo) BIBEMS w/ c/o N/V, malaise, loose stool since night prior to presentation. Upon evaluation in ED, pt was noted to go into [facility] and given 10mg IV cardizem x 1, followed by 30mg PO x 1. Pt was also noted to have an increase in serum lactate from 3.0 to 6.8 after receiving 4L of NS, and becoming hypotensive w/ pressure in the 80's. ICU called for further evaluation. Pt denies recent travel, recent known sick contacts, or change in bowel habits.

## 2018-10-01 NOTE — ED ADULT NURSE NOTE - NSIMPLEMENTINTERV_GEN_ALL_ED
Implemented All Universal Safety Interventions:  [place] to call system. Call bell, personal items and telephone within reach. Instruct patient to call for assistance. Room bathroom lighting operational. Non-slip footwear when patient is off stretcher. Physically safe environment: no spills, clutter or unnecessary equipment. Stretcher in lowest position, wheels locked, appropriate side rails in place.

## 2018-10-01 NOTE — CONSULT NOTE ADULT - SUBJECTIVE AND OBJECTIVE BOX
Information from chart:  "Patient is a 72y old  Female who presents with a chief complaint of fever, malaise (01 Oct 2018 04:30)    HPI:  Called to see 71 Y/O female w/ PMHx of HTN, DM, breast CA (s/p lumpectomy, radiation/chemo) BIBEMS w/ c/o N/V, malaise, loose stool since night prior to presentation. Upon evaluation in ED, pt was noted to go into REHAN and given 10mg IV cardizem x 1, followed by 30mg PO x 1. Pt was also noted to have an increase in serum lactate from 3.0 to 6.8 after receiving 4L of NS, and becoming hypotensive w/ pressure in the 80's. ICU called for further evaluation. Pt denies recent travel, recent known sick contacts, or change in bowel habits. (01 Oct 2018 04:30)      Patient alert, conversive, no distress.  Takes Metformin only as prescribed, no extra doses;      "    PAST MEDICAL & SURGICAL HISTORY:  HTN (hypertension)  DM (diabetes mellitus)  Breast cancer  H/O lumpectomy    FAMILY HISTORY:    Allergies    No Known Allergies    Intolerances      Home Medications:    MEDICATIONS  (STANDING):  dextrose 5%. 1000 milliLiter(s) (50 mL/Hr) IV Continuous <Continuous>  dextrose 50% Injectable 12.5 Gram(s) IV Push once  dextrose 50% Injectable 25 Gram(s) IV Push once  dextrose 50% Injectable 25 Gram(s) IV Push once  insulin lispro (HumaLOG) corrective regimen sliding scale   SubCutaneous three times a day before meals  insulin lispro (HumaLOG) corrective regimen sliding scale   SubCutaneous at bedtime  metroNIDAZOLE  IVPB      metroNIDAZOLE  IVPB 500 milliGRAM(s) IV Intermittent every 8 hours  norepinephrine Infusion 0.1 MICROgram(s)/kG/Min (13.106 mL/Hr) IV Continuous <Continuous>  pantoprazole    Tablet 40 milliGRAM(s) Oral before breakfast  piperacillin/tazobactam IVPB. 3.375 Gram(s) IV Intermittent every 8 hours  potassium chloride    Tablet ER 40 milliEquivalent(s) Oral every 4 hours    MEDICATIONS  (PRN):  dextrose 40% Gel 15 Gram(s) Oral once PRN Blood Glucose LESS THAN 70 milliGRAM(s)/deciliter  glucagon  Injectable 1 milliGRAM(s) IntraMuscular once PRN Glucose LESS THAN 70 milligrams/deciliter    Vital Signs Last 24 Hrs  T(C): 37.4 (01 Oct 2018 11:49), Max: 38.8 (30 Sep 2018 20:08)  T(F): 99.3 (01 Oct 2018 11:49), Max: 101.9 (30 Sep 2018 20:08)  HR: 105 (01 Oct 2018 12:00) (80 - 134)  BP: 102/66 (01 Oct 2018 12:00) (56/30 - 159/117)  BP(mean): 73 (01 Oct 2018 12:00) (34 - 128)  RR: 28 (01 Oct 2018 12:00) (16 - 28)  SpO2: 100% (01 Oct 2018 12:00) (92% - 100%)    Daily Height in cm: 154.94 (30 Sep 2018 20:08)    Daily     09-30-18 @ 07:01  -  10-01-18 @ 07:00  --------------------------------------------------------  IN: 6.5 mL / OUT: 0 mL / NET: 6.5 mL    10-01-18 @ 07:01  -  10-01-18 @ 12:11  --------------------------------------------------------  IN: 339 mL / OUT: 0 mL / NET: 339 mL      CAPILLARY BLOOD GLUCOSE      POCT Blood Glucose.: 288 mg/dL (01 Oct 2018 11:19)    PHYSICAL EXAM:      T(C): 37.4 (10-01-18 @ 11:49), Max: 38.8 (09-30-18 @ 20:08)  HR: 105 (10-01-18 @ 12:00) (80 - 134)  BP: 102/66 (10-01-18 @ 12:00) (56/30 - 159/117)  RR: 28 (10-01-18 @ 12:00) (16 - 28)  SpO2: 100% (10-01-18 @ 12:00) (92% - 100%)  Wt(kg): --  Respiratory: clear anteriorly, decreased BS at bases  Cardiovascular: S1 S2  Gastrointestinal: soft NT ND +BS  Extremities:   tr edema              10-01    144  |  111<H>  |  14  ----------------------------<  247<H>  3.3<L>   |  16<L>  |  1.54<H>    Ca    6.5<LL>  corrected 7.7    01 Oct 2018 10:24  Phos  2.7     10-01  Mg     0.9     10-01    TPro  4.7<L>  /  Alb  2.4<L>  /  TBili  0.8  /  DBili  .59<H>  /  AST  114<H>  /  ALT  65  /  AlkPhos  77  10-01                          10.0   12.74 )-----------( 84       ( 01 Oct 2018 10:24 )             32.3       ABG - ( 01 Oct 2018 03:43 )  pH, Arterial: x     pH, Blood: 7.32  /  pCO2: 28    /  pO2: 91    / HCO3: 14    / Base Excess: -10.5 /  SaO2: 96            Assessment and Plan    HERBIE septic shock, ATN; Type A and B lactic acidosis suspected;  IVF; IV abx as per cultures;   Discontinue IV bicarbonate drip for now, pH acceptable, Ca lower trend.  Follow lactic acid and CrCl trend, will follow closely for hemodialytic indications.

## 2018-10-01 NOTE — PROVIDER CONTACT NOTE (EICU) - BACKGROUND
71 Y/O female w/ PMHx of HTN, DM, breast CA (s/p lumpectomy, radiation/chemo) BIBEMS w/ c/o N/V, malaise, loose stool since night prior to presentation. While in ED, pts serum lactate went from 3.0 to 6.8 after receiving 4L of NS, and becoming hypotensive w/ pressure in the 80's. Pt remained hypotensive so started on levophed for BP support.

## 2018-10-01 NOTE — ED ADULT NURSE NOTE - OBJECTIVE STATEMENT
pt c/o of "not feeling good' generalized body aches and chills beginning today. pt states  N/V/D loose stools today. pmh lumpectomy right side for Breast CA 2006 Pink arm band applied to right extremity. pt denies any recent sickness, sick relatives, recent travel. Skin intact

## 2018-10-01 NOTE — H&P ADULT - NSHPREVIEWOFSYSTEMS_GEN_ALL_CORE
REVIEW OF SYSTEMS:    CONSTITUTIONAL: No fever, weight loss, or fatigue  EYES: No eye pain, visual disturbances, or discharge  ENMT:  No difficulty hearing, tinnitus, vertigo; No sinus or throat pain  NECK: No pain or stiffness  BREASTS: No pain, masses, or nipple discharge  RESPIRATORY: No cough, wheezing, chills or hemoptysis; + shortness of breath  CARDIOVASCULAR: No chest pain, palpitations, dizziness, or leg swelling  GASTROINTESTINAL: No abdominal or epigastric pain. + nausea, +vomiting, no hematemesis; No diarrhea or constipation. No melena or hematochezia.  GENITOURINARY: No dysuria, frequency, hematuria, or incontinence  NEUROLOGICAL: No headaches, memory loss, loss of strength, numbness, or tremors  SKIN: No itching, burning, rashes, or lesions   LYMPH NODES: No enlarged glands  ENDOCRINE: No heat or cold intolerance; No hair loss  MUSCULOSKELETAL: No joint pain or swelling; No muscle, back, or extremity pain  PSYCHIATRIC: No depression, anxiety, mood swings, or difficulty sleeping  HEME/LYMPH: No easy bruising, or bleeding gums  ALLERGY AND IMMUNOLOGIC: No hives or eczema

## 2018-10-01 NOTE — H&P ADULT - ASSESSMENT
71 Y/O female w/ PMHx of HTN, DM, breast CA (s/p lumpectomy, radiation/chemo) BIBEMS w/ c/o N/V, malaise, loose stool since night prior to presentation. While in ED, pts serum lactate went from 3.0 to 6.8 after receiving 4L of NS, and becoming hypotensive w/ pressure in the 80's. Pt remained hypotensive so started on levophed for BP support. 71 Y/O female w/ PMHx of HTN, DM, breast CA (s/p lumpectomy, radiation/chemo) BIBEMS w/ c/o N/V, malaise, loose stool since night prior to presentation. While in ED, pts serum lactate went from 3.0 to 6.8 after receiving 4L of NS, and becoming hypotensive w/ pressure in the 80's. Pt remained hypotensive so started on levophed for BP support.       Pulm:  1. O2 supplementation as needed  2. CXR, abg as needed  3. continue to monitor O2 SAT  4. neb tx as needed  5. DVT prophylaxis     Cardio:  1. IVF as needed for BP support  2. Pressor support as needed  3. echocardigram  4. continue to monitor BP  5. diuresis as needed    ID:  1. f/u culture results  2. IVF   3. abx coverage     General:  1. repeat labs, replace electrolytes as needed  2. admit pt to ICU for further management

## 2018-10-01 NOTE — H&P ADULT - NSHPPHYSICALEXAM_GEN_ALL_CORE
PHYSICAL EXAM:    GENERAL: NAD, well-groomed, well-developed  HEAD:  Atraumatic, Normocephalic  EYES: EOMI, PERRLA, conjunctiva and sclera clear  ENMT: No tonsillar erythema, exudates, or enlargement; Moist mucous membranes  NECK: Supple, No JVD, Normal thyroid  NERVOUS SYSTEM:  Alert & Oriented X3  CHEST/LUNG: Clear to percussion bilaterally; No rales, rhonchi, wheezing, or rubs  HEART: Regular rate and rhythm; No murmurs, rubs, or gallops  ABDOMEN: Soft, Nontender, Nondistended; Bowel sounds present  EXTREMITIES:  2+ Peripheral Pulses, No clubbing, cyanosis, or edema  LYMPH: No lymphadenopathy noted  SKIN: No rashes or lesions

## 2018-10-01 NOTE — PROVIDER CONTACT NOTE (EICU) - RECOMMENDATIONS
IVF, BS abx  pan culture  levophed to keep MAP >65  fup lactate, pt was on metformin could be contributing to lactic acidosis  check echo  check stool culture and c-diff

## 2018-10-01 NOTE — H&P ADULT - ATTENDING COMMENTS
I have seen and examined the patient. I agree with the above history, physical exam, and plan of care except for as detailed below.    73 y/o F w/hx of breast cancer, DM on metformin presenting with severe sepsis with septic shock, HERBIE, lactic acidosis, coagulopathy likely DIC secondary to infection vs HUS, hypocalcemia, and hypomagnesemia. Lactic acidosis appears out of proportion to degree of illness concerning for metformin toxicity.    - Broad spectrum abx, follow up cultures  - Levophed, titrate to goal MAP >= 65  - Renal consult , for possible HD for metformin clearance  - Serna catheter for monitoring UOP  - Trend Cr, avoid nephrotoxins  - Replete lytes as needed  - Trend coags, no need for correction currently as no sign of active bleeding  - Check complements, blood smear looking for schistocytes, ADAMTS 13    Attending critical care time 55 minutes I have seen and examined the patient. I agree with the above history, physical exam, and plan of care except for as detailed below.    71 y/o F w/hx of breast cancer, DM on metformin presenting with severe sepsis with septic shock, HERBIE, lactic acidosis, coagulopathy likely DIC secondary to infection vs HUS, hypocalcemia, and hypomagnesemia. Lactic acidosis appears out of proportion to degree of illness concerning for metformin toxicity. Pulmonary nodule concerning for possible malignancy.    - Broad spectrum abx, follow up cultures  - Levophed, titrate to goal MAP >= 65  - Renal consult , for possible HD for metformin clearance  - Serna catheter for monitoring UOP  - Trend Cr, avoid nephrotoxins  - Replete lytes as needed  - Trend coags, no need for correction currently as no sign of active bleeding  - Check complements, blood smear looking for schistocytes, ADAMTS 13  - Outpatient follow up of pulmonary nodule    Attending critical care time 55 minutes I have seen and examined the patient. I agree with the above history, physical exam, and plan of care except for as detailed below.    71 y/o F w/hx of breast cancer, DM on metformin presenting with severe sepsis with septic shock, HERBIE, lactic acidosis, coagulopathy likely DIC secondary to infection vs HUS, hypocalcemia, hypokalemia, and hypomagnesemia. Lactic acidosis appears out of proportion to degree of illness concerning for metformin toxicity. Pulmonary nodule concerning for possible malignancy.    - Broad spectrum abx, follow up cultures  - Levophed, titrate to goal MAP >= 65  - Renal consult , for possible HD for metformin clearance  - Serna catheter for monitoring UOP  - Trend Cr, avoid nephrotoxins  - Replete lytes as needed  - Trend coags, no need for correction currently as no sign of active bleeding  - Check complements, blood smear looking for schistocytes, ADAMTS 13  - Outpatient follow up of pulmonary nodule    Attending critical care time 55 minutes

## 2018-10-01 NOTE — ED ADULT NURSE REASSESSMENT NOTE - NS ED NURSE REASSESS COMMENT FT1
Report given to ICU nurse vaibhav. Will redraw lactate now. Urine pending discussed with ICU nurse. 18 g IV intact left ac

## 2018-10-01 NOTE — ED ADULT NURSE REASSESSMENT NOTE - NS ED NURSE REASSESS COMMENT FT1
pt c/o of shortness of breath suddenly. Md schaefer notified. nebulizer treatment initiated as per orders

## 2018-10-01 NOTE — PROVIDER CONTACT NOTE (EICU) - ASSESSMENT
septic? shock, no WBC but febrile. source unknown, CTA with pulm nodule( h/o CA breast) but no obvious source of infection in chest or abdomen.

## 2018-10-02 NOTE — DIETITIAN INITIAL EVALUATION ADULT. - NUTRITIONGOAL OUTCOME1
Pt consumes >75% of energy intake; maintain wt +/-2# during hospitalization Pt tolerates + consumes >75% of energy intake; maintain wt +/-2# during hospitalization Pt meet>75% of energy/protein via TF; maintains glycemic control SDIS=[phone]/dl

## 2018-10-02 NOTE — PROCEDURE NOTE - NSPOSTCAREGUIDE_GEN_A_CORE
Care for catheter as per unit/ICU protocols
Instructed patient/caregiver regarding signs and symptoms of infection/Keep the cast/splint/dressing clean and dry/Verbal/written post procedure instructions were given to patient/caregiver/Instructed patient/caregiver to follow-up with primary care physician/Care for catheter as per unit/ICU protocols
Verbal/written post procedure instructions were given to patient/caregiver

## 2018-10-02 NOTE — CONSULT NOTE ADULT - SUBJECTIVE AND OBJECTIVE BOX
Patient is a 72y old  Female who presents with a chief complaint of fever, malaise (02 Oct 2018 16:21)      Review of Systems:    see above      Discussed with:  PMD, Family    Physical Exam:    Vital Signs:  Vital Signs Last 24 Hrs  T(C): 38.3 (02 Oct 2018 19:50), Max: 38.3 (02 Oct 2018 19:50)  T(F): 100.9 (02 Oct 2018 19:50), Max: 100.9 (02 Oct 2018 19:50)  HR: 102 (02 Oct 2018 21:00) (79 - 166)  BP: 145/78 (02 Oct 2018 09:05) (87/53 - 150/125)  BP(mean): 94 (02 Oct 2018 09:05) (61 - 131)  RR: 32 (02 Oct 2018 21:00) (17 - 35)  SpO2: 100% (02 Oct 2018 21:00) (50% - 100%)  Daily     Daily Weight in k (02 Oct 2018 13:03)  I&O's Summary    01 Oct 2018 07:01  -  02 Oct 2018 07:00  --------------------------------------------------------  IN: 8648 mL / OUT: 1300 mL / NET: 7348 mL    02 Oct 2018 07:01  -  02 Oct 2018 21:41  --------------------------------------------------------  IN: 2346.2 mL / OUT: 650 mL / NET: 1696.2 mL          Chest:  decreased BL  Cardiovascular:  Regular rhythm, S1, S2,    Abdomen:  Soft, non-tender, non-distended, normoactive bowel sounds, no HSM      Laboratory:                          10.8   22.60 )-----------( 71       ( 02 Oct 2018 14:57 )             33.3     10-02    145  |  113<H>  |  22  ----------------------------<  252<H>  4.3   |  15<L>  |  1.68<H>    Ca    7.0<L>      02 Oct 2018 05:47  Phos  2.6     10-02  Mg     1.6     10-    TPro  3.7<L>  /  Alb  1.7<L>  /  TBili  1.2  /  DBili  x   /  AST  88<H>  /  ALT  69  /  AlkPhos  58  10-02    ABG - ( 02 Oct 2018 19:43 )  pH, Arterial: x     pH, Blood: 7.32  /  pCO2: 30    /  pO2: 100   / HCO3: 15    / Base Excess: -9.6  /  SaO2: 97                CARDIAC MARKERS ( 02 Oct 2018 14:57 )  7.430 ng/mL / x     / 479 U/L / x     / x      CARDIAC MARKERS ( 02 Oct 2018 05:47 )  .616 ng/mL / x     / 245 U/L / x     / 4.8 ng/mL  CARDIAC MARKERS ( 01 Oct 2018 04:24 )  .044 ng/mL / x     / 149 U/L / x     / 1.5 ng/mL      CAPILLARY BLOOD GLUCOSE      POCT Blood Glucose.: 269 mg/dL (02 Oct 2018 16:56)  POCT Blood Glucose.: 262 mg/dL (02 Oct 2018 11:51)  POCT Blood Glucose.: 274 mg/dL (02 Oct 2018 06:45)  POCT Blood Glucose.: 290 mg/dL (02 Oct 2018 02:08)    LIVER FUNCTIONS - ( 02 Oct 2018 05:47 )  Alb: 1.7 g/dL / Pro: 3.7 gm/dL / ALK PHOS: 58 U/L / ALT: 69 U/L / AST: 88 U/L / GGT: x           PT/INR - ( 02 Oct 2018 02:38 )   PT: 27.5 sec;   INR: 2.48 ratio         PTT - ( 02 Oct 2018 02:38 )  PTT:44.1 sec  Urinalysis Basic - ( 01 Oct 2018 12:54 )    Color: Yellow / Appearance: Clear / S.005 / pH: x  Gluc: x / Ketone: Negative  / Bili: Negative / Urobili: Negative mg/dL   Blood: x / Protein: 30 mg/dL / Nitrite: Negative   Leuk Esterase: Negative / RBC: 0-2 /HPF / WBC x   Sq Epi: x / Non Sq Epi: Occasional / Bacteria: Occasional      Assessment:  Asked to assess this patient with septic shock.  Patient remains in intensive care unit with full support.  Noted troponin level increased significantly.  This very likely may be a cardiac event however in the setting of acute sepsis with a preserved ejection fraction on a recent diagnostic echocardiogram.  The initiation of a full dose Aspirin as tolerable is recommended.  Continue intravenous antibiotics

## 2018-10-02 NOTE — PROVIDER CONTACT NOTE (EICU) - RECOMMENDATIONS
C/W AC ventilation. IV Vanco, IV flagly  merro, tobramycin for g neg sepsis, on bicarb drip and ph has improved somewhat. c/w vasopressors to keep MAP > 65. May need HD if can tolerate. Currently too unstable to go for CT , quentin had neg CT ABD a little over a day ago.  Prognosis is extremely guarded. C/W AC ventilation. IV Vanco, IV flagly  merro, tobramycin for g neg sepsis, on bicarb drip and ph has improved somewhat. c/w vasopressors to keep MAP > 65. May need HD if can tolerate. Currently too unstable to go for CT , quentin had neg CT ABD a little over a day ago.  Prognosis is extremely guarded.  D/W Asia Berry in detail

## 2018-10-02 NOTE — DIETITIAN INITIAL EVALUATION ADULT. - DIET TYPE
NPO with tube feedings/10/02 10/02/18 Vital Af 1.2 @ 30m/hr to goal rate 55ml/vs=5415df, 1584kcal & 99gm protein/NPO with tube feedings

## 2018-10-02 NOTE — DIETITIAN INITIAL EVALUATION ADULT. - PERTINENT MEDS FT
Humalog ss, Protonix, Proamatine, Rocephin, Peridex, ascorbic acid, precede, dobutamine, solucortef, magnesium sulfate, norepinephrine, propofol, sodium bicarbonate, thiamine, pitressin Humalog ss, Protonix, Proamatine, Rocephin, Peridex, ascorbic acid, precedex, dobutamine, solucortef, magnesium sulfate, norepinephrine, propofol=153kcals/24 hours, sodium bicarbonate, thiamine, pitressin

## 2018-10-02 NOTE — CONSULT NOTE ADULT - SUBJECTIVE AND OBJECTIVE BOX
HPI:  72yFemale    Allergies    No Known Allergies    Intolerances        Social History:  Tobacco:  Alcohol:  Recent Travel:  Immunizations:    MEDICATIONS  (STANDING):  ALBUTerol    0.083% 2.5 milliGRAM(s) Nebulizer every 6 hours  ascorbic acid IVPB 1500 milliGRAM(s) IV Intermittent every 6 hours  cefTRIAXone   IVPB      cefTRIAXone   IVPB 2 Gram(s) IV Intermittent every 12 hours  chlorhexidine 0.12% Liquid 15 milliLiter(s) Swish and Spit two times a day  dexmedetomidine Infusion 0.7 MICROgram(s)/kG/Hr (13.405 mL/Hr) IV Continuous <Continuous>  dextrose 5%. 1000 milliLiter(s) (50 mL/Hr) IV Continuous <Continuous>  dextrose 50% Injectable 12.5 Gram(s) IV Push once  dextrose 50% Injectable 25 Gram(s) IV Push once  dextrose 50% Injectable 25 Gram(s) IV Push once  hydrocortisone sodium succinate Injectable 50 milliGRAM(s) IV Push every 6 hours  insulin lispro (HumaLOG) corrective regimen sliding scale   SubCutaneous three times a day before meals  insulin lispro (HumaLOG) corrective regimen sliding scale   SubCutaneous at bedtime  midodrine 10 milliGRAM(s) Oral every 8 hours  norepinephrine Infusion 1 MICROgram(s)/kG/Min (71.813 mL/Hr) IV Continuous <Continuous>  pantoprazole    Tablet 40 milliGRAM(s) Oral before breakfast  propofol Infusion 5 MICROgram(s)/kG/Min (2.298 mL/Hr) IV Continuous <Continuous>  thiamine IVPB 200 milliGRAM(s) IV Intermittent <User Schedule>  vasopressin Infusion 0.04 Unit(s)/Min (2.4 mL/Hr) IV Continuous <Continuous>    MEDICATIONS  (PRN):  acetaminophen   Tablet .. 650 milliGRAM(s) Oral every 6 hours PRN Temp greater or equal to 38C (100.4F), Mild Pain (1 - 3)  dextrose 40% Gel 15 Gram(s) Oral once PRN Blood Glucose LESS THAN 70 milliGRAM(s)/deciliter  glucagon  Injectable 1 milliGRAM(s) IntraMuscular once PRN Glucose LESS THAN 70 milligrams/deciliter      CBC Full  -  ( 02 Oct 2018 02:38 )  WBC Count : 16.22 K/uL  Hemoglobin : 11.3 g/dL  Hematocrit : 37.8 %  Platelet Count - Automated : 69 K/uL  Mean Cell Volume : 91.1 fl  Mean Cell Hemoglobin : 27.2 pg  Mean Cell Hemoglobin Concentration : 29.9 gm/dL  Auto Neutrophil # : x  Auto Lymphocyte # : x  Auto Monocyte # : x  Auto Eosinophil # : x  Auto Basophil # : x  Auto Neutrophil % : x  Auto Lymphocyte % : x  Auto Monocyte % : x  Auto Eosinophil % : x  Auto Basophil % : x    10    145  |  113<H>  |  22  ----------------------------<  252<H>  4.3   |  15<L>  |  1.68<H>    Ca    7.0<L>      02 Oct 2018 05:47  Phos  2.6     10  Mg     1.3     10    TPro  3.7<L>  /  Alb  1.7<L>  /  TBili  1.2  /  DBili  x   /  AST  88<H>  /  ALT  69  /  AlkPhos  58  10-02    LIVER FUNCTIONS - ( 02 Oct 2018 05:47 )  Alb: 1.7 g/dL / Pro: 3.7 gm/dL / ALK PHOS: 58 U/L / ALT: 69 U/L / AST: 88 U/L / GGT: x           PT/INR - ( 02 Oct 2018 02:38 )   PT: 27.5 sec;   INR: 2.48 ratio       PTT - ( 02 Oct 2018 02:38 )  PTT:44.1 sec      Urinalysis Basic - ( 01 Oct 2018 12:54 )  Color: Yellow / Appearance: Clear / S.005 / pH: x  Gluc: x / Ketone: Negative  / Bili: Negative / Urobili: Negative mg/dL   Blood: x / Protein: 30 mg/dL / Nitrite: Negative   Leuk Esterase: Negative / RBC: 0-2 /HPF / WBC x   Sq Epi: x / Non Sq Epi: Occasional / Bacteria: Occasional      ABG - ( 02 Oct 2018 14:39 )  pH, Arterial: x     pH, Blood: 7.24  /  pCO2: 42    /  pO2: 90    / HCO3: 17    / Base Excess: -9.4  /  SaO2: 95                CARDIAC MARKERS ( 02 Oct 2018 05:47 )  .616 ng/mL / x     / 245 U/L / x     / 4.8 ng/mL  CARDIAC MARKERS ( 01 Oct 2018 04:24 )  .044 ng/mL / x     / 149 U/L / x     / 1.5 ng/mL  CARDIAC MARKERS ( 30 Sep 2018 20:39 )  <.015 ng/mL / x     / 90 U/L / x     / <1.0 ng/mL                MICROBIOLOGY:  Specimen Source: .Urine Clean Catch (Midstream) (10-01 @ 14:43)  Culture Results:   No growth (10-01 @ :43)    Specimen Source: .Blood Blood ( @ 22:52)  Culture Results:   Growth in aerobic bottle: Neisseria meningitidis  "Due to technical problems, Proteus sp. will Not be reported as part of  the BCID panel until further notice"  ***Blood Panel PCR results on this specimen are available  approximately 3 hours after the Gram stain result.***  Gram stain, PCR, and/or culture results may not always  correspond due to difference in methodologies.  ************************************************************  This PCR assay was performed using [employer].  Thefollowing targets are tested for: Enterococcus,  vancomycin resistant enterococci, Listeria monocytogenes,  coagulase negative staphylococci, S. aureus,  methicillin resistant S. aureus, Streptococcus agalactiae  (Group B), S. pneumoniae, S. pyogenes(Group A),  Acinetobacter baumannii, Enterobacter cloacae, E. coli,  Klebsiella oxytoca, K. pneumoniae, Proteus sp.,  Serratia marcescens, Haemophilus influenzae,  Neisseria meningitidis, Pseudomonas aeruginosa, Candida  albicans, C. glabrata, C krusei, C parapsilosis,  C. tropicalis and the KPC resistance gene. ( @ 22:52)    Specimen Source: .Blood Blood ( @ 22:52)  Culture Results:   Growth in aerobic bottle: Gram Negative Coccobacilli ( @ 22:52)                Radiology:   CXR - < from: Xray Chest 2 Views PA/Lat (18 @ 21:05) >    INTERPRETATION:  Fever cough.    PA lateral.    Heart and mediastinum normal.  Lungs clear.  Costophrenic angles sharp   bilaterally.    IMPRESSION: Normal chest                               < from: CT Abdomen and Pelvis w/ IV Cont (18 @ 22:13) >  EXAM:  CT ABDOMEN AND PELVIS IC                          EXAM:  CT ANGIO CHEST (W)AW IC                            PROCEDURE DATE:  2018          INTERPRETATION:  CLINICAL INFORMATION: Fever, vomiting and shortness of   breath. History of breast cancer.    TECHNIQUE: CT angiogram chest and contrast enhanced CT of the abdomen and   pelvis were performed. MIP, coronal and sagittal reformats were   generated. 98 cc Omnipaque-350 were administered intravenously and 2 cc   were discarded. No oral contrast administered.    COMPARISON: There is no prior study available for comparison.    FINDINGS:   CHEST:  Lungs and airways: The trachea and main bronchi are patent.  8 mm   spiculated nodule in the periphery of the anterior basal segment of the   right lower lobe (4-60).  There is no pleural effusion or pneumothorax.     Heart and vessels: The heart size is normal. Coronary artery   calcifications. There is no pericardial effusion. The thoracic aorta and   main pulmonary artery are normal caliber.     Mediastinum and soft tissues: The thyroid gland is unremarkable. There is   no axillary, mediastinal or hilar adenopathy. Partial right mastectomy.   Coarse dystrophic calcifications and soft tissue density in the right   breast soft tissues.    Bones: The bony structures are intact.    ABDOMEN AND PELVIS:  Hepatobiliary: Within normal limits.  Pancreas: Within normal limits.  Spleen: Within normal limits.  Adrenals: Within normal limits.    Kidneys/Ureters/Bladder: Within normal limits.  Reproductive Organs: Hysterectomy.    Bowel/Peritoneum: Portions of the gastrointestinal tract are collapsed,   limiting evaluation. No bowel obstruction, inflammation or   pneumoperitoneum.  Normal appendix. Colonic diverticulosis without   diverticulitis.    Vessels:  Mild calcific atherosclerosis. No abdominal aortic aneurysm.  Lymphatics/Retroperitoneum: No lymphadenopathy. No retroperitoneal   hematoma.      Soft Tissues: Small fat-containing umbilical hernia.  Bones: Spinal degenerative changes. Lucent lesions with central stippled   appearance in the T11 and L4 vertebral bodies, possibly vertebral   hemangiomas.    IMPRESSION:  CT Chest:   8 mm spiculated right lower lobe nodule, concerning for primary or   metastatic neoplasm.     Partial right mastectomy with coarse dystrophic calcifications and soft   tissue density in the right breast; correlate with prior mammographic   imaging.    CT Abdomen and Pelvis:   No bowel obstruction or acute findings.     Lucent lesions in theT11 and L4 vertebral bodies with features favoring   hemangiomas, although further imaging recommended to exclude presence of   metabolic activity (if not already performed).    PET CT may be helpful to further characterize both the right lower lobe   pulmonary nodule and vertebral body lesions.             Last 24 Hrs  T(C): 38.2 (02 Oct 2018 12:30), Max: 38.2 (02 Oct 2018 12:30)  T(F): 100.7 (02 Oct 2018 12:30), Max: 100.7 (02 Oct 2018 12:30)  HR: 106 (02 Oct 2018 14:00) (79 - 166)  BP: 145/78 (02 Oct 2018 09:05) (74/48 - 150/125)  BP(mean): 94 (02 Oct 2018 09:05) (55 - 131)  RR: 28 (02 Oct 2018 14:00) (17 - 38)  SpO2: 97% (02 Oct 2018 14:00) (50% - 100%)  Supplemental O2:  Mode: AC/ CMV (Assist Control/ Continuous Mandatory Ventilation)  RR (machine): 24  TV (machine): 290  FiO2: 35  PEEP: 5  ITime: 0.8  MAP: 9  PIP: 19    PHYSICAL EXAM    General:  HEENT:   Neck:  Heart:  Lungs:  Abdomen:  Extremities:  Skin:          I&O's Summary :    01 Oct 2018 07:01  -  02 Oct 2018 07:00  --------------------------------------------------------  IN: 8648 mL / OUT: 1300 mL / NET: 7348 mL    02 Oct 2018 07:01  -  02 Oct 2018 14:52  --------------------------------------------------------  IN: 1691.4 mL / OUT: 450 mL / NET: 1241.4 mL        Impression:    Suggestions:

## 2018-10-02 NOTE — DIETITIAN INITIAL EVALUATION ADULT. - PERTINENT LABORATORY DATA
10-02 Na 145 mmol/L Glu 252 mg/dL<H> K+ 4.3 mmol/L Cr  1.68 mg/dL<H> BUN 22 mg/dL Phos 2.6 mg/dL Alb 1.7 g/dL<L> PAB n/a   Hgb n/a   Hct n/a  10-01 POCT 209, 274 Gluc 252 09-30 Gluc 172 >H< Alb 4.1 10-02 POCT 262 >H< A1C ? 10-02 Na 145 mmol/L Glu 252 mg/dL<H> K+ 4.3 mmol/L Cr  1.68 mg/dL<H> BUN 22 mg/dL Phos 2.6 mg/dL Alb 1.7 g/dL<L> PAB n/a   Hgb 11.3   Hct 37.8  10-01 POCT 262, 274, 290 Gluc 252 09-30 Gluc 172 >H< Alb 4.1 10-02 , WBC=16.22

## 2018-10-02 NOTE — DIETITIAN INITIAL EVALUATION ADULT. - SIGNS/SYMPTOMS
hypoglycemia upon admission hypoglycemia upon admission; Glucose 172 (9/30) NPO x 1 day; (NGT feeding not started); hyperglycemia (Glucose=252, QPQL=568, 274, 290)

## 2018-10-02 NOTE — DIETITIAN INITIAL EVALUATION ADULT. - SOURCE
family/significant other/other (specify)/pt's ex sister-in-law; review medical chart review medical chart & pt's ex sister in law not familiar with pt's medical hx; other family members not present @ this time/other (specify)

## 2018-10-02 NOTE — DIETITIAN INITIAL EVALUATION ADULT. - OTHER INFO
Pt seen for CCU admit. Pt currently intubated unable to get hx. Per PA pt managed Metformin at home. Pt lives alone per pt's ex sister-in-law. Pending NG TF for Vital 1.2 AF @ 55mL/hr.  Last BM x2 (10/1). Pt seen for CCU admit. Pt currently intubated unable to obtain wt & diet hx. Per PA pt managed Metformin at home. Pt presents with hyperglycemia & unable to assess compliance to diabetic diet restrictions or compliance with diabetic medications PTA.  Pt lives alone per pt's ex sister-in-law. Pt to start NGT feeding Vital 1.2 AF today.  Last BM x2 (10/1).

## 2018-10-02 NOTE — DIETITIAN INITIAL EVALUATION ADULT. - NS AS NUTRI INTERV ENTERAL NUTRITION
recommend modification to enteral support to Glucerna 1.2 @ 50ml/hr (1,440 kcals + gm protein)/Composition recommend modification to enteral support NGT feeding Glucerna 1.2 @ 50ml/hr (1200ml, 1,440 kcals + 72gm protein)/Composition

## 2018-10-02 NOTE — DIETITIAN INITIAL EVALUATION ADULT. - ENERGY NEEDS
Ht: 61"     Wt: 76.6 (10/1)    IBW: 47.7+/-10%       146% IBW      BMI: 31.9 Ht: 61"     Wt: 76.6kg (10/1)    IBW: 47.7kg+/-10%       146% IBW      BMI: 31.9,  UBW=not availalbe

## 2018-10-02 NOTE — PROGRESS NOTE ADULT - ASSESSMENT
73 y/o F w/hx of breast cancer, DM on metformin presenting with severe sepsis with septic shock, HERBIE, lactic acidosis, coagulopathy likely DIC secondary to infection vs HUS, hypocalcemia, hypokalemia, and hypomagnesemia. Lactic acidosis appears out of proportion to degree of illness concerning for metformin toxicity. Pulmonary nodule concerning for possible malignancy.    Neuro: Sedation as needed goal RASS -1 to 0  - Fentanyl PRN, propofol    Resp:   - 6 cc/Kg IBW  - Daily SAT/SBT  - Barrier to extubation is septic shock and metabolic acidosis  - Outpatient follow up of pulmonary nodule    CV: Septic shock due to gram negative bacteremia, likely component of cardiogenic shock as well as there is severely reduced LV function on my bedside echo  - Levophed, vaso, dobutamine titrate to goal MAP >= 65    Renal: HERBIE improving  - Follow up renal recs  - Monitor UOP  - Trend Cr, avoid nephrotoxins  - Persistent lactic acidosis unclear if solely from sepsis vs combination of sepsis + metformin toxicity in setting of HERBIE    ID: Septic shock, GNB  - Broad spectrum abx, follow up sensitivities    Heme: Small amounts of schistocytes seen on smear, unlikely MAHA  - Trend hgb, coags, no current evidence of bleeding. Coagulopathy likely secondary to DIC from infection    FEN  - Tube feeds  - Replete lytes as needed    Attending critical care time 55 minutes .

## 2018-10-02 NOTE — DIETITIAN INITIAL EVALUATION ADULT. - ETIOLOGY
inadequate energy intake related to acute illness + incubation decreased ability to consume sufficient energy

## 2018-10-02 NOTE — PROGRESS NOTE ADULT - SUBJECTIVE AND OBJECTIVE BOX
HPI:  Called to see 73 Y/O female w/ PMHx of HTN, DM, breast CA (s/p lumpectomy, radiation/chemo) BIBEMS w/ c/o N/V, malaise, loose stool since night prior to presentation. Upon evaluation in ED, pt was noted to go into REHAN and given 10mg IV cardizem x 1, followed by 30mg PO x 1. Pt was also noted to have an increase in serum lactate from 3.0 to 6.8 after receiving 4L of NS, and becoming hypotensive w/ pressure in the 80's. ICU called for further evaluation. Pt denies recent travel, recent known sick contacts, or change in bowel habits. (01 Oct 2018 04:30)      24 hr events: Intubated for acute respiratory failure. Unable to obtain history.    ## ROS:  [x ] unable to obtain    ## Vitals  ICU Vital Signs Last 24 Hrs  T(C): 36.8 (02 Oct 2018 08:30), Max: 37.4 (01 Oct 2018 11:49)  T(F): 98.2 (02 Oct 2018 08:30), Max: 99.3 (01 Oct 2018 11:49)  HR: 132 (02 Oct 2018 10:30) (79 - 132)  BP: 145/78 (02 Oct 2018 09:05) (74/48 - 150/125)  BP(mean): 94 (02 Oct 2018 09:05) (55 - 131)  ABP: 106/55 (02 Oct 2018 10:30) (67/34 - 188/84)  ABP(mean): 75 (02 Oct 2018 10:30) (49 - 123)  RR: 27 (02 Oct 2018 10:30) (17 - 38)  SpO2: 91% (02 Oct 2018 10:30) (81% - 100%)      ## Physical Exam:  Gen: Elderly female lying in bed, intubated, NAD  HEENT: NC/AT sclerae anicteric  Resp: Intubated, mechanical breath sounds b/l  CV: S1, S2, tachycardic  Abd: Soft, +BS  Ext: + Edema  Neuro: Awake, confused, intermittently follows commands, moves all extremities    ## Vent Data  Mode: AC/ CMV (Assist Control/ Continuous Mandatory Ventilation)  RR (machine): 24  TV (machine): 290  FiO2: 30  PEEP: 5  ITime: 1  MAP: 14  PIP: 36      ## Labs:  Chem:  10-02    145  |  113<H>  |  22  ----------------------------<  252<H>  4.3   |  15<L>  |  1.68<H>    Ca    7.0<L>      02 Oct 2018 05:47  Phos  2.6     10  Mg     1.3     10    TPro  3.7<L>  /  Alb  1.7<L>  /  TBili  1.2  /  DBili  x   /  AST  88<H>  /  ALT  69  /  AlkPhos  58  10    LIVER FUNCTIONS - ( 02 Oct 2018 05:47 )  Alb: 1.7 g/dL / Pro: 3.7 gm/dL / ALK PHOS: 58 U/L / ALT: 69 U/L / AST: 88 U/L / GGT: x           CBC:                        11.3   16.22 )-----------( 69       ( 02 Oct 2018 02:38 )             37.8     Coags:  PT/INR - ( 02 Oct 2018 02:38 )   PT: 27.5 sec;   INR: 2.48 ratio         PTT - ( 02 Oct 2018 02:38 )  PTT:44.1 sec    Urinalysis Basic - ( 01 Oct 2018 12:54 )    Color: Yellow / Appearance: Clear / S.005 / pH: x  Gluc: x / Ketone: Negative  / Bili: Negative / Urobili: Negative mg/dL   Blood: x / Protein: 30 mg/dL / Nitrite: Negative   Leuk Esterase: Negative / RBC: 0-2 /HPF / WBC x   Sq Epi: x / Non Sq Epi: Occasional / Bacteria: Occasional        ## Cardiac  CARDIAC MARKERS ( 02 Oct 2018 05:47 )  .616 ng/mL / x     / 245 U/L / x     / 4.8 ng/mL  CARDIAC MARKERS ( 01 Oct 2018 04:24 )  .044 ng/mL / x     / 149 U/L / x     / 1.5 ng/mL  CARDIAC MARKERS ( 30 Sep 2018 20:39 )  <.015 ng/mL / x     / 90 U/L / x     / <1.0 ng/mL        ## Blood Gas  ABG - ( 02 Oct 2018 08:02 )  pH, Arterial: x     pH, Blood: 7.30  /  pCO2: 24    /  pO2: 139   / HCO3: 11    / Base Excess: -13.4 /  SaO2: 99                  #I/Os  I&O's Detail    01 Oct 2018 07:  -  02 Oct 2018 07:00  --------------------------------------------------------  IN:    dexmedetomidine Infusion: 7.6 mL    lactated ringers.: 1000 mL    lactated ringers.: 2000 mL    norepinephrine Infusion: 3383.8 mL    Oral Fluid: 480 mL    propofol Infusion: 92 mL    sodium bicarbonate  Infusion: 375 mL    sodium bicarbonate  Infusion: 400 mL    Solution: 100 mL    Solution: 250 mL    Solution: 300 mL    Solution: 50 mL    Solution: 200 mL    vasopressin Infusion: 9.6 mL  Total IN: 8648 mL    OUT:    Indwelling Catheter - Urethral: 1300 mL  Total OUT: 1300 mL    Total NET: 7348 mL      02 Oct 2018 07:  -  02 Oct 2018 10:43  --------------------------------------------------------  IN:    dexmedetomidine Infusion: 1.9 mL    DOBUTamine Infusion: 11.5 mL    norepinephrine Infusion: 129 mL    norepinephrine Infusion: 114.9 mL    propofol Infusion: 29.8 mL    sodium bicarbonate  Infusion: 300 mL    Solution: 50 mL    Solution: 100 mL    vasopressin Infusion: 7.2 mL  Total IN: 744.3 mL    OUT:    Indwelling Catheter - Urethral: 130 mL  Total OUT: 130 mL    Total NET: 614.3 mL          ## Imaging:    ## Medications:  MEDICATIONS  (STANDING):  ALBUTerol    0.083% 2.5 milliGRAM(s) Nebulizer every 6 hours  ascorbic acid IVPB 1500 milliGRAM(s) IV Intermittent every 6 hours  chlorhexidine 0.12% Liquid 15 milliLiter(s) Swish and Spit two times a day  dexmedetomidine Infusion 0.7 MICROgram(s)/kG/Hr (13.405 mL/Hr) IV Continuous <Continuous>  dextrose 5%. 1000 milliLiter(s) (50 mL/Hr) IV Continuous <Continuous>  dextrose 50% Injectable 12.5 Gram(s) IV Push once  dextrose 50% Injectable 25 Gram(s) IV Push once  dextrose 50% Injectable 25 Gram(s) IV Push once  DOBUTamine Infusion 5 MICROgram(s)/kG/Min (11.49 mL/Hr) IV Continuous <Continuous>  hydrocortisone sodium succinate Injectable 50 milliGRAM(s) IV Push every 6 hours  insulin lispro (HumaLOG) corrective regimen sliding scale   SubCutaneous three times a day before meals  insulin lispro (HumaLOG) corrective regimen sliding scale   SubCutaneous at bedtime  meropenem  IVPB 1000 milliGRAM(s) IV Intermittent every 12 hours  metroNIDAZOLE  IVPB      metroNIDAZOLE  IVPB 500 milliGRAM(s) IV Intermittent every 8 hours  midodrine 10 milliGRAM(s) Oral every 8 hours  norepinephrine Infusion 1 MICROgram(s)/kG/Min (71.813 mL/Hr) IV Continuous <Continuous>  pantoprazole    Tablet 40 milliGRAM(s) Oral before breakfast  propofol Infusion 5 MICROgram(s)/kG/Min (2.298 mL/Hr) IV Continuous <Continuous>  sodium bicarbonate  Infusion 0.196 mEq/kG/Hr (100 mL/Hr) IV Continuous <Continuous>  thiamine IVPB 200 milliGRAM(s) IV Intermittent <User Schedule>  vasopressin Infusion 0.04 Unit(s)/Min (2.4 mL/Hr) IV Continuous <Continuous>    MEDICATIONS  (PRN):  dextrose 40% Gel 15 Gram(s) Oral once PRN Blood Glucose LESS THAN 70 milliGRAM(s)/deciliter  glucagon  Injectable 1 milliGRAM(s) IntraMuscular once PRN Glucose LESS THAN 70 milligrams/deciliter

## 2018-10-02 NOTE — PROVIDER CONTACT NOTE (EICU) - BACKGROUND
noted worsening shock atate and hemodynamics overnight. Called by Klaus Carolina for increasing resp rate, lethargic. noted to have increasing lactic acidosis and severe acidemia. We made decision to intubate for worsening sepsis, lactate elevated at 10. ABX broadened to merro, Vanco, tobramycin 80 mg x 1 as has gram neg coccobacilus in blood, pan scan did not show obvious source of the infection. TLC and a line placed for worsening shock state, now on levo 1 mcg/kg/ min and fixed dose vaso, multiple fluid boluses but minimal UO. HERBIE worsening and UO minimal. AXR ordered and did not show dilated loops of bowel.

## 2018-10-02 NOTE — CHART NOTE - NSCHARTNOTEFT_GEN_A_CORE
Patient is a 72y old  Female who presents with a chief complaint of fever, malaise (01 Oct 2018 12:10)    24 hour events: Pt became tachypneic to the 40's and hypotensive with SBP in 70's.  STAT ABG noted to have severe metabolic acidosis with pH 7.18/28/67/-17.0/89%, and LA 10.4.  Pt subsequently and intubated 2/2 increased work of breathing and tachypnea in the setting of respiratory compensation with severe HAGMA metabolic.  Pt was started on high dose levophed gtt for HD BP support, LIJ central line placed, and pt started on vasopressin for BP/renal support.  UOP poor at this time.        PAST MEDICAL & SURGICAL HISTORY:  HTN (hypertension)  DM (diabetes mellitus)  Breast cancer  H/O lumpectomy      Review of Systems:  ROS not able to be obtained as pt is intubated and sedated     T(F): 96.8 (10-02-18 @ 00:21), Max: 99.3 (10-01-18 @ 11:49)  HR: 116 (10-02-18 @ 04:00) (85 - 127)  BP: 133/116 (10-02-18 @ 04:00) (56/30 - 159/117)  RR: 24 (10-02-18 @ 04:00) (16 - 38)  SpO2: 100% (10-02-18 @ 04:00) (94% - 100%)  Wt(kg): --    Mode: AC/ CMV (Assist Control/ Continuous Mandatory Ventilation), RR (machine): 24, TV (machine): 450, FiO2: 100, PEEP: 5    CAPILLARY BLOOD GLUCOSE      POCT Blood Glucose.: 290 mg/dL (02 Oct 2018 02:08)      I&O's Summary    30 Sep 2018 07:  -  01 Oct 2018 07:00  --------------------------------------------------------  IN: 6.5 mL / OUT: 0 mL / NET: 6.5 mL    01 Oct 2018 07:  -  02 Oct 2018 05:20  --------------------------------------------------------  IN: 7484.1 mL / OUT: 1100 mL / NET: 6384.1 mL        Physical Exam:     Gen: intubated and sedated, moves extremities and awakens off sedation   Neuro: A&Ox3, non-focal moves extremities and awakens off sedation   HEENT: NC/AT  Resp: CTA b/l  CVS: nl S1/S2, RRR  Abd: distended and firm, BS distant to ausculation   Ext: no edema, +pulses, distal extremities cold to touch  Skin: skin in distal extremities poorly perfused     Meds:  meropenem  IVPB IV Intermittent  metroNIDAZOLE  IVPB   metroNIDAZOLE  IVPB IV Intermittent  vancomycin  IVPB   vancomycin  IVPB IV Intermittent  midodrine Oral  norepinephrine Infusion IV Continuous  dextrose 40% Gel Oral PRN  dextrose 50% Injectable IV Push  dextrose 50% Injectable IV Push  dextrose 50% Injectable IV Push  glucagon  Injectable IntraMuscular PRN  insulin lispro (HumaLOG) corrective regimen sliding scale SubCutaneous  insulin lispro (HumaLOG) corrective regimen sliding scale SubCutaneous  vasopressin Infusion IV Continuous  ALBUTerol    0.083% Nebulizer  dexmedetomidine Infusion IV Continuous  propofol Infusion IV Continuous  pantoprazole    Tablet Oral  dextrose 5%. IV Continuous  lactated ringers. IV Continuous  lactated ringers. IV Continuous  sodium bicarbonate  Infusion IV Continuous  chlorhexidine 0.12% Liquid Swish and Spit                              11.3   16.22 )-----------( 69       ( 02 Oct 2018 02:38 )             37.8     Bands 14.0    10-    147<H>  |  116<H>  |  22  ----------------------------<  284<H>  4.1   |  11<L>  |  2.19<H>    Ca    7.6<L>      02 Oct 2018 02:38  Phos  4.0     10-  Mg     2.0     10-02    TPro  5.3<L>  /  Alb  2.4<L>  /  TBili  1.1  /  DBili  .77<H>  /  AST  85<H>  /  ALT  80<H>  /  AlkPhos  74  10    Lactate 10.4           10 @ 02:40    Lactate 8.2           10-01 @ 10:24    Lactate 8.1           10-01 @ 05:44      CARDIAC MARKERS ( 01 Oct 2018 04:24 )  .044 ng/mL / x     / 149 U/L / x     / 1.5 ng/mL  CARDIAC MARKERS ( 30 Sep 2018 20:39 )  <.015 ng/mL / x     / 90 U/L / x     / <1.0 ng/mL      PT/INR - ( 02 Oct 2018 02:38 )   PT: 27.5 sec;   INR: 2.48 ratio         PTT - ( 02 Oct 2018 02:38 )  PTT:44.1 sec  Urinalysis Basic - ( 01 Oct 2018 12:54 )    Color: Yellow / Appearance: Clear / S.005 / pH: x  Gluc: x / Ketone: Negative  / Bili: Negative / Urobili: Negative mg/dL   Blood: x / Protein: 30 mg/dL / Nitrite: Negative   Leuk Esterase: Negative / RBC: 0-2 /HPF / WBC x   Sq Epi: x / Non Sq Epi: Occasional / Bacteria: Occasional      .Blood Blood   Growth in aerobic bottle: Gram Negative Coccobacilli  "Due to technical problems, Proteus sp. will Not be reported as part of  the BCID panel until further notice"  ***Blood Panel PCR results on this specimen are available  approximately 3 hours after the Gram stain result.***  Gram stain, PCR, and/or culture results may not always  correspond due to difference in methodologies.  ************************************************************  This PCR assay was performed using [employer].  The following targets are tested for: Enterococcus,  vancomycin resistant enterococci, Listeria monocytogenes,  coagulase negative staphylococci, S. aureus,  methicillin resistant S. aureus, Streptococcus agalactiae  (Group B), S. pneumoniae, S. pyogenes (Group A),  Acinetobacter baumannii, Enterobacter cloacae, E. coli,  Klebsiella oxytoca, K. pneumoniae, Proteus sp.,  Serratia marcescens, Haemophilus influenzae,  Neisseria meningitidis, Pseudomonas aeruginosa, Candida  albicans, C. glabrata, C krusei, C parapsilosis,  C. tropicalis and the KPC resistance gene.   Growth in aerobic bottle: Gram Negative Coccobacilli  @ 22:52      Rapid RVP Result: Not Detec ( @ 22:41)      Radiology:     Bedside lung ultrasound: B-lines throughout anteriorly, no noted pleural effusions at the bases, ?right consolidation vs. atelectasis at R base    Bedside ECHO: LV function WNL, no noted pericardial effusion     CENTRAL LINE: Y/N          DATE INSERTED:              REMOVE: Y/N    SHAH: Y/N                        DATE INSERTED:              REMOVE: Y/N    A-LINE: Y/N                       DATE INSERTED:              REMOVE: Y/N    GLOBAL ISSUE/BEST PRACTICE:  Analgesia: Propofol  Sedation: Propofol/precedex  HOB elevation: yes  Stress ulcer prophylaxis: protonix   VTE prophylaxis: SCD  Glycemic control: N/A  Nutrition: NPO      CODE STATUS: Full Code   [facility] discussion: Y    Critical Care time: ***assessing presenting problems of acute illness that poses high probability of life threatening deterioration or end organ damage/dysfunction.  Medical decision making including Initiating plan of care, reviewing data, reviewing radiology, direct patient bedside evaluation and interpretation of vital signs, any necessary ventilator management , discussion with multidisciplinary team, discussing goals of care with patient/family, all non inclusive of procedures Patient is a 72y old  Female who presents with a chief complaint of fever, malaise (01 Oct 2018 12:10)    24 hour events: Pt became tachypneic to the 40's and hypotensive with SBP in 70's.  STAT ABG noted to have severe metabolic acidosis with pH 7.18/28/67/-17.0/89%, and LA 10.4.  Pt subsequently and intubated 2/2 increased work of breathing and tachypnea in the setting of respiratory compensation with severe HAGMA metabolic.  Pt was started on high dose levophed gtt for HD BP support, LIJ central line and arterial line placed, and pt started on vasopressin for BP/renal support.  UOP poor at this time.        PAST MEDICAL & SURGICAL HISTORY:  HTN (hypertension)  DM (diabetes mellitus)  Breast cancer  H/O lumpectomy      Review of Systems:  ROS not able to be obtained as pt is intubated and sedated     T(F): 96.8 (10-02-18 @ 00:21), Max: 99.3 (10-01-18 @ 11:49)  HR: 116 (10-02-18 @ 04:00) (85 - 127)  BP: 133/116 (10-02-18 @ 04:00) (56/30 - 159/117)  RR: 24 (10-02-18 @ 04:00) (16 - 38)  SpO2: 100% (10-02-18 @ 04:00) (94% - 100%)  Wt(kg): --    Mode: AC/ CMV (Assist Control/ Continuous Mandatory Ventilation), RR (machine): 24, TV (machine): 450, FiO2: 100, PEEP: 5    CAPILLARY BLOOD GLUCOSE      POCT Blood Glucose.: 290 mg/dL (02 Oct 2018 02:08)      I&O's Summary    30 Sep 2018 07:  -  01 Oct 2018 07:00  --------------------------------------------------------  IN: 6.5 mL / OUT: 0 mL / NET: 6.5 mL    01 Oct 2018 07:  -  02 Oct 2018 05:20  --------------------------------------------------------  IN: 7484.1 mL / OUT: 1100 mL / NET: 6384.1 mL        Physical Exam:     Gen: intubated and sedated, moves extremities and awakens off sedation   Neuro: A&Ox3, non-focal moves extremities and awakens off sedation   HEENT: NC/AT  Resp: CTA b/l  CVS: nl S1/S2, RRR  Abd: distended and firm, BS distant to ausculation   Ext: no edema, +pulses, distal extremities cold to touch  Skin: skin in distal extremities poorly perfused     Meds:  meropenem  IVPB IV Intermittent  metroNIDAZOLE  IVPB   metroNIDAZOLE  IVPB IV Intermittent  vancomycin  IVPB   vancomycin  IVPB IV Intermittent  midodrine Oral  norepinephrine Infusion IV Continuous  dextrose 40% Gel Oral PRN  dextrose 50% Injectable IV Push  dextrose 50% Injectable IV Push  dextrose 50% Injectable IV Push  glucagon  Injectable IntraMuscular PRN  insulin lispro (HumaLOG) corrective regimen sliding scale SubCutaneous  insulin lispro (HumaLOG) corrective regimen sliding scale SubCutaneous  vasopressin Infusion IV Continuous  ALBUTerol    0.083% Nebulizer  dexmedetomidine Infusion IV Continuous  propofol Infusion IV Continuous  pantoprazole    Tablet Oral  dextrose 5%. IV Continuous  lactated ringers. IV Continuous  lactated ringers. IV Continuous  sodium bicarbonate  Infusion IV Continuous  chlorhexidine 0.12% Liquid Swish and Spit                              11.3   16.22 )-----------( 69       ( 02 Oct 2018 02:38 )             37.8     Bands 14.0    10-    147<H>  |  116<H>  |  22  ----------------------------<  284<H>  4.1   |  11<L>  |  2.19<H>    Ca    7.6<L>      02 Oct 2018 02:38  Phos  4.0     10-  Mg     2.0     10    TPro  5.3<L>  /  Alb  2.4<L>  /  TBili  1.1  /  DBili  .77<H>  /  AST  85<H>  /  ALT  80<H>  /  AlkPhos  74  10-    Lactate 10.4           10 @ 02:40    Lactate 8.2           10 @ 10:24    Lactate 8.1           10 @ 05:44      CARDIAC MARKERS ( 01 Oct 2018 04:24 )  .044 ng/mL / x     / 149 U/L / x     / 1.5 ng/mL  CARDIAC MARKERS ( 30 Sep 2018 20:39 )  <.015 ng/mL / x     / 90 U/L / x     / <1.0 ng/mL      PT/INR - ( 02 Oct 2018 02:38 )   PT: 27.5 sec;   INR: 2.48 ratio         PTT - ( 02 Oct 2018 02:38 )  PTT:44.1 sec  Urinalysis Basic - ( 01 Oct 2018 12:54 )    Color: Yellow / Appearance: Clear / S.005 / pH: x  Gluc: x / Ketone: Negative  / Bili: Negative / Urobili: Negative mg/dL   Blood: x / Protein: 30 mg/dL / Nitrite: Negative   Leuk Esterase: Negative / RBC: 0-2 /HPF / WBC x   Sq Epi: x / Non Sq Epi: Occasional / Bacteria: Occasional      .Blood Blood   Growth in aerobic bottle: Gram Negative Coccobacilli  "Due to technical problems, Proteus sp. will Not be reported as part of  the BCID panel until further notice"  ***Blood Panel PCR results on this specimen are available  approximately 3 hours after the Gram stain result.***  Gram stain, PCR, and/or culture results may not always  correspond due to difference in methodologies.  ************************************************************  This PCR assay was performed using [employer].  The following targets are tested for: Enterococcus,  vancomycin resistant enterococci, Listeria monocytogenes,  coagulase negative staphylococci, S. aureus,  methicillin resistant S. aureus, Streptococcus agalactiae  (Group B), S. pneumoniae, S. pyogenes (Group A),  Acinetobacter baumannii, Enterobacter cloacae, E. coli,  Klebsiella oxytoca, K. pneumoniae, Proteus sp.,  Serratia marcescens, Haemophilus influenzae,  Neisseria meningitidis, Pseudomonas aeruginosa, Candida  albicans, C. glabrata, C krusei, C parapsilosis,  C. tropicalis and the KPC resistance gene.   Growth in aerobic bottle: Gram Negative Coccobacilli 09-30 @ 22:52      Rapid RVP Result: Not Detec ( @ 22:41)      Radiology:     Bedside lung ultrasound: B-lines throughout anteriorly, no noted pleural effusions at the bases, ?right consolidation vs. atelectasis at R base    Bedside ECHO: LV contractility decreased from earlier in shift, small pericardial effusion, no signs of RV strain or septal flattening.       CENTRAL LINE: Y          DATE INSERTED: 10/2/18             REMOVE: Y/N    SHAH: Y                       DATE INSERTED:  10/2/18            REMOVE: Y/N    A-LINE: Y                       DATE INSERTED:  10/2/18            REMOVE: Y/N    GLOBAL ISSUE/BEST PRACTICE:  Analgesia: Propofol  Sedation: Propofol/precedex  HOB elevation: yes  Stress ulcer prophylaxis: protonix   VTE prophylaxis: SCD  Glycemic control: N/A  Nutrition: NPO      CODE STATUS: Full Code   [facility] discussion: Y    ASSESSMENT:   73 y/o F w/hx of breast cancer, DM on metformin presenting with severe sepsis with septic shock, lactic acidosis with ?metformin toxicity, became tachypneic to the 40's, subsequently and intubated 2/2 increased work of breathing and tachypnea in the setting of respiratory compensation with severe HAGMA metabolic on Bicarb , septic shock on dual pressors         Critical Care time: 75 min assessing presenting problems of acute illness that poses high probability of life threatening deterioration or end organ damage/dysfunction.  Medical decision making including Initiating plan of care, reviewing data, reviewing radiology, direct patient bedside evaluation and interpretation of vital signs, any necessary ventilator management , discussion with multidisciplinary team, discussing goals of care with patient/family, all non inclusive of procedures Patient is a 72y old  Female who presents with a chief complaint of fever, malaise (01 Oct 2018 12:10)    24 hour events: Pt began c/o diffuse abdominal pain, > in RUQ, generalized ill feeling, became tachypneic to the 40's and hypotensive with SBP in 70's.  STAT ABG noted to have severe metabolic acidosis with pH 7.18/28/67/-17.0/89%, and LA 10.4.  Pt subsequently and intubated 2/2 increased work of breathing and tachypnea in the setting of respiratory compensation with severe HAGMA metabolic.  Pt was started on high dose levophed gtt for HD BP support, given 3L LR bolus, abx changed from zosyn to meropenem, given 2 amps of bicarb then started bicarb drip, LIJ central line and arterial line placed, and pt started on vasopressin for BP/renal support.  UOP poor at this time. Niece called and made aware of events.         PAST MEDICAL & SURGICAL HISTORY:  HTN (hypertension)  DM (diabetes mellitus)  Breast cancer  H/O lumpectomy      Review of Systems:  ROS not able to be obtained as pt is intubated and sedated     T(F): 96.8 (10-02-18 @ 00:21), Max: 99.3 (10-01-18 @ 11:49)  HR: 116 (10-02-18 @ 04:00) (85 - 127)  BP: 133/116 (10-02-18 @ 04:00) (56/30 - 159/117)  RR: 24 (10-02-18 @ 04:00) (16 - 38)  SpO2: 100% (10-02-18 @ 04:00) (94% - 100%)  Wt(kg): --    Mode: AC/ CMV (Assist Control/ Continuous Mandatory Ventilation), RR (machine): 24, TV (machine): 450, FiO2: 100, PEEP: 5    CAPILLARY BLOOD GLUCOSE      POCT Blood Glucose.: 290 mg/dL (02 Oct 2018 02:08)      I&O's Summary    30 Sep 2018 07:01  -  01 Oct 2018 07:00  --------------------------------------------------------  IN: 6.5 mL / OUT: 0 mL / NET: 6.5 mL    01 Oct 2018 07:01  -  02 Oct 2018 05:20  --------------------------------------------------------  IN: 7484.1 mL / OUT: 1100 mL / NET: 6384.1 mL        Physical Exam:     Gen: intubated and sedated, moves extremities and awakens off sedation   Neuro: A&Ox3, non-focal moves extremities and awakens off sedation   HEENT: NC/AT  Resp: CTA b/l  CVS: nl S1/S2, RRR  Abd: distended and firm, BS distant to ausculation   Ext: no edema, +pulses, distal extremities cold to touch  Skin: skin in distal extremities poorly perfused     Meds:  meropenem  IVPB IV Intermittent  metroNIDAZOLE  IVPB   metroNIDAZOLE  IVPB IV Intermittent  vancomycin  IVPB   vancomycin  IVPB IV Intermittent  midodrine Oral  norepinephrine Infusion IV Continuous  dextrose 40% Gel Oral PRN  dextrose 50% Injectable IV Push  dextrose 50% Injectable IV Push  dextrose 50% Injectable IV Push  glucagon  Injectable IntraMuscular PRN  insulin lispro (HumaLOG) corrective regimen sliding scale SubCutaneous  insulin lispro (HumaLOG) corrective regimen sliding scale SubCutaneous  vasopressin Infusion IV Continuous  ALBUTerol    0.083% Nebulizer  dexmedetomidine Infusion IV Continuous  propofol Infusion IV Continuous  pantoprazole    Tablet Oral  dextrose 5%. IV Continuous  lactated ringers. IV Continuous  lactated ringers. IV Continuous  sodium bicarbonate  Infusion IV Continuous  chlorhexidine 0.12% Liquid Swish and Spit                              11.3   16.22 )-----------( 69       ( 02 Oct 2018 02:38 )             37.8     Bands 14.0    10-02    147<H>  |  116<H>  |  22  ----------------------------<  284<H>  4.1   |  11<L>  |  2.19<H>    Ca    7.6<L>      02 Oct 2018 02:38  Phos  4.0     10-  Mg     2.0     10-    TPro  5.3<L>  /  Alb  2.4<L>  /  TBili  1.1  /  DBili  .77<H>  /  AST  85<H>  /  ALT  80<H>  /  AlkPhos  74  10-02    Lactate 10.4           10-02 @ 02:40    Lactate 8.2           10-01 @ 10:24    Lactate 8.1           10-01 @ 05:44      CARDIAC MARKERS ( 01 Oct 2018 04:24 )  .044 ng/mL / x     / 149 U/L / x     / 1.5 ng/mL  CARDIAC MARKERS ( 30 Sep 2018 20:39 )  <.015 ng/mL / x     / 90 U/L / x     / <1.0 ng/mL      PT/INR - ( 02 Oct 2018 02:38 )   PT: 27.5 sec;   INR: 2.48 ratio         PTT - ( 02 Oct 2018 02:38 )  PTT:44.1 sec  Urinalysis Basic - ( 01 Oct 2018 12:54 )    Color: Yellow / Appearance: Clear / S.005 / pH: x  Gluc: x / Ketone: Negative  / Bili: Negative / Urobili: Negative mg/dL   Blood: x / Protein: 30 mg/dL / Nitrite: Negative   Leuk Esterase: Negative / RBC: 0-2 /HPF / WBC x   Sq Epi: x / Non Sq Epi: Occasional / Bacteria: Occasional      .Blood Blood   Growth in aerobic bottle: Gram Negative Coccobacilli  "Due to technical problems, Proteus sp. will Not be reported as part of  the BCID panel until further notice"  ***Blood Panel PCR results on this specimen are available  approximately 3 hours after the Gram stain result.***  Gram stain, PCR, and/or culture results may not always  correspond due to difference in methodologies.  ************************************************************  This PCR assay was performed using [employer].  The following targets are tested for: Enterococcus,  vancomycin resistant enterococci, Listeria monocytogenes,  coagulase negative staphylococci, S. aureus,  methicillin resistant S. aureus, Streptococcus agalactiae  (Group B), S. pneumoniae, S. pyogenes (Group A),  Acinetobacter baumannii, Enterobacter cloacae, E. coli,  Klebsiella oxytoca, K. pneumoniae, Proteus sp.,  Serratia marcescens, Haemophilus influenzae,  Neisseria meningitidis, Pseudomonas aeruginosa, Candida  albicans, C. glabrata, C krusei, C parapsilosis,  C. tropicalis and the KPC resistance gene.   Growth in aerobic bottle: Gram Negative Coccobacilli  @ 22:52      Rapid RVP Result: Not Detec ( @ 22:41)      Radiology:     Bedside lung ultrasound: B-lines throughout anteriorly, no noted pleural effusions at the bases, ?right consolidation vs. atelectasis at R base    Bedside ECHO: LV contractility decreased from earlier in shift, small pericardial effusion, no signs of RV strain or septal flattening.       CENTRAL LINE: Y          DATE INSERTED: 10/2/18             REMOVE: Y/N    SHAH: Y                       DATE INSERTED:  10/2/18            REMOVE: Y/N    A-LINE: Y                       DATE INSERTED:  10/2/18            REMOVE: Y/N    GLOBAL ISSUE/BEST PRACTICE:  Analgesia: Propofol  Sedation: Propofol/precedex  HOB elevation: yes  Stress ulcer prophylaxis: protonix   VTE prophylaxis: SCD  Glycemic control: N/A  Nutrition: NPO      CODE STATUS: Full Code   [facility] discussion: Y    ASSESSMENT:   73 y/o F w/hx of breast cancer, DM on metformin presenting with severe sepsis with septic shock, lactic acidosis with ?metformin toxicity, became tachypneic to the 40's, subsequently and intubated 2/2 increased work of breathing and tachypnea in the setting of respiratory compensation with severe HAGMA metabolic on Bicarb , septic shock on dual pressors         Critical Care time: 75 min assessing presenting problems of acute illness that poses high probability of life threatening deterioration or end organ damage/dysfunction.  Medical decision making including Initiating plan of care, reviewing data, reviewing radiology, direct patient bedside evaluation and interpretation of vital signs, any necessary ventilator management , discussion with multidisciplinary team, discussing goals of care with patient/family, all non inclusive of procedures Patient is a 72y old  Female who presents with a chief complaint of fever, malaise (01 Oct 2018 12:10)    24 hour events: Pt began c/o diffuse abdominal pain, > in RUQ, generalized ill feeling, became tachypneic to the 40's and hypotensive with SBP in 70's.  STAT ABG noted to have severe metabolic acidosis with pH 7.18/28/67/-17.0/89%, and LA 10.4.  Pt subsequently and intubated 2/2 increased work of breathing and tachypnea in the setting of respiratory compensation with severe HAGMA metabolic.  Pt was started on high dose levophed gtt for HD BP support, given 3L LR bolus, abx changed from zosyn to meropenem, given 2 amps of bicarb then started bicarb drip, LIJ central line and arterial line placed, and pt started on vasopressin for BP/renal support.  UOP poor at this time. Niece called and made aware of events.         PAST MEDICAL & SURGICAL HISTORY:  HTN (hypertension)  DM (diabetes mellitus)  Breast cancer  H/O lumpectomy      Review of Systems:  ROS not able to be obtained as pt is intubated and sedated     T(F): 96.8 (10-02-18 @ 00:21), Max: 99.3 (10-01-18 @ 11:49)  HR: 116 (10-02-18 @ 04:00) (85 - 127)  BP: 133/116 (10-02-18 @ 04:00) (56/30 - 159/117)  RR: 24 (10-02-18 @ 04:00) (16 - 38)  SpO2: 100% (10-02-18 @ 04:00) (94% - 100%)  Wt(kg): --    Mode: AC/ CMV (Assist Control/ Continuous Mandatory Ventilation), RR (machine): 24, TV (machine): 450, FiO2: 100, PEEP: 5    CAPILLARY BLOOD GLUCOSE      POCT Blood Glucose.: 290 mg/dL (02 Oct 2018 02:08)      I&O's Summary    30 Sep 2018 07:01  -  01 Oct 2018 07:00  --------------------------------------------------------  IN: 6.5 mL / OUT: 0 mL / NET: 6.5 mL    01 Oct 2018 07:01  -  02 Oct 2018 05:20  --------------------------------------------------------  IN: 7484.1 mL / OUT: 1100 mL / NET: 6384.1 mL        Physical Exam:     Gen: intubated and sedated, moves extremities and awakens off sedation   Neuro: A&Ox3, non-focal moves extremities and awakens off sedation   HEENT: NC/AT  Resp: CTA b/l  CVS: nl S1/S2, RRR  Abd: distended and firm, BS distant to ausculation   Ext: no edema, +pulses, distal extremities cold to touch  Skin: skin in distal extremities poorly perfused     Meds:  meropenem  IVPB IV Intermittent  metroNIDAZOLE  IVPB   metroNIDAZOLE  IVPB IV Intermittent  vancomycin  IVPB   vancomycin  IVPB IV Intermittent  midodrine Oral  norepinephrine Infusion IV Continuous  dextrose 40% Gel Oral PRN  dextrose 50% Injectable IV Push  dextrose 50% Injectable IV Push  dextrose 50% Injectable IV Push  glucagon  Injectable IntraMuscular PRN  insulin lispro (HumaLOG) corrective regimen sliding scale SubCutaneous  insulin lispro (HumaLOG) corrective regimen sliding scale SubCutaneous  vasopressin Infusion IV Continuous  ALBUTerol    0.083% Nebulizer  dexmedetomidine Infusion IV Continuous  propofol Infusion IV Continuous  pantoprazole    Tablet Oral  dextrose 5%. IV Continuous  lactated ringers. IV Continuous  lactated ringers. IV Continuous  sodium bicarbonate  Infusion IV Continuous  chlorhexidine 0.12% Liquid Swish and Spit                              11.3   16.22 )-----------( 69       ( 02 Oct 2018 02:38 )             37.8     Bands 14.0    10-02    147<H>  |  116<H>  |  22  ----------------------------<  284<H>  4.1   |  11<L>  |  2.19<H>    Ca    7.6<L>      02 Oct 2018 02:38  Phos  4.0     10-  Mg     2.0     10-    TPro  5.3<L>  /  Alb  2.4<L>  /  TBili  1.1  /  DBili  .77<H>  /  AST  85<H>  /  ALT  80<H>  /  AlkPhos  74  10-02    Lactate 10.4           10-02 @ 02:40    Lactate 8.2           10-01 @ 10:24    Lactate 8.1           10-01 @ 05:44      CARDIAC MARKERS ( 01 Oct 2018 04:24 )  .044 ng/mL / x     / 149 U/L / x     / 1.5 ng/mL  CARDIAC MARKERS ( 30 Sep 2018 20:39 )  <.015 ng/mL / x     / 90 U/L / x     / <1.0 ng/mL      PT/INR - ( 02 Oct 2018 02:38 )   PT: 27.5 sec;   INR: 2.48 ratio         PTT - ( 02 Oct 2018 02:38 )  PTT:44.1 sec  Urinalysis Basic - ( 01 Oct 2018 12:54 )    Color: Yellow / Appearance: Clear / S.005 / pH: x  Gluc: x / Ketone: Negative  / Bili: Negative / Urobili: Negative mg/dL   Blood: x / Protein: 30 mg/dL / Nitrite: Negative   Leuk Esterase: Negative / RBC: 0-2 /HPF / WBC x   Sq Epi: x / Non Sq Epi: Occasional / Bacteria: Occasional      .Blood Blood   Growth in aerobic bottle: Gram Negative Coccobacilli  "Due to technical problems, Proteus sp. will Not be reported as part of  the BCID panel until further notice"  ***Blood Panel PCR results on this specimen are available  approximately 3 hours after the Gram stain result.***  Gram stain, PCR, and/or culture results may not always  correspond due to difference in methodologies.  ************************************************************  This PCR assay was performed using [employer].  The following targets are tested for: Enterococcus,  vancomycin resistant enterococci, Listeria monocytogenes,  coagulase negative staphylococci, S. aureus,  methicillin resistant S. aureus, Streptococcus agalactiae  (Group B), S. pneumoniae, S. pyogenes (Group A),  Acinetobacter baumannii, Enterobacter cloacae, E. coli,  Klebsiella oxytoca, K. pneumoniae, Proteus sp.,  Serratia marcescens, Haemophilus influenzae,  Neisseria meningitidis, Pseudomonas aeruginosa, Candida  albicans, C. glabrata, C krusei, C parapsilosis,  C. tropicalis and the KPC resistance gene.   Growth in aerobic bottle: Gram Negative Coccobacilli  @ 22:52      Rapid RVP Result: Not Detec ( @ 22:41)      Radiology: pending     Bedside lung ultrasound: B-lines throughout anteriorly, no noted pleural effusions at the bases, ?right consolidation vs. atelectasis at R base    Bedside ECHO: LV contractility decreased from earlier in shift, small pericardial effusion, no signs of RV strain or septal flattening.       CENTRAL LINE: Y          DATE INSERTED: 10/2/18             REMOVE: Y/N    SHAH: Y                       DATE INSERTED:  10/2/18            REMOVE: Y/N    A-LINE: Y                       DATE INSERTED:  10/2/18            REMOVE: Y/N    GLOBAL ISSUE/BEST PRACTICE:  Analgesia: Propofol  Sedation: Propofol/precedex  HOB elevation: yes  Stress ulcer prophylaxis: protonix   VTE prophylaxis: SCD  Glycemic control: N/A  Nutrition: NPO      CODE STATUS: Full Code   GO discussion: Y    ASSESSMENT:   73 y/o F w/hx of breast cancer, DM on metformin presenting with severe sepsis with septic shock, lactic acidosis with ?metformin toxicity, became tachypneic to the 40's, subsequently and intubated 2/2 increased work of breathing and tachypnea in the setting of respiratory compensation with severe HAGMA metabolic on Bicarb gtt, lactic acidosis, cardiogenic vs. septic shock on dual pressors with GN bacteremia, HERBIE likely in the setting of ischemic ATN.      PLAN     1.  Hypoxic respiratory Failure with tachypnea and increased WOB, now intubated on MV   -Patient currently on Full vent support  -sedated on propofol and precedex   -titrate settings to maintain SaO2 >90%, or pH >7.25  -Goal Vt 6-8 cc/kg of ideal body weight  -plateau pressure goal <30  -Peridex oral care and VAP prophylaxis with HOB 30 degrees   -aggressive chest PT and suctioning   -daily sedation vacation with spontaneous breathing trial if clinical condition warrants, discuss with respiratory therapy    2.  Sepsis/septic shock 2/2 to GN bacteremia, unknown source at this time, ?acute abdomen   -CT scan to be performed when stable   -Abdominal U/S in the setting of c/o RUQ during acute event   -30 cc/kg fluid challenge without improvement in blood pressure  -patient currently on Levophed, will titrate for MAP 65-70  -repeat lactate  -blood/urine/sputum cultures, then initiate broad spectrum antibiotics  -Pt now on Meropenam, Vanco, Tobra X1   -follow cultures and narrow antibiotics as able  -goal UOP >0.5 cc/kg/hr  -procalcitonin    3.  Cardiogenic Shock   -Bedside POCUS performed at the bedside with notable decrease in LV function compared to earlier in night  -CE's sent will trend   -          Critical Care time: 75 min assessing presenting problems of acute illness that poses high probability of life threatening deterioration or end organ damage/dysfunction.  Medical decision making including Initiating plan of care, reviewing data, reviewing radiology, direct patient bedside evaluation and interpretation of vital signs, any necessary ventilator management , discussion with multidisciplinary team, discussing goals of care with patient/family, all non inclusive of procedures Patient is a 72y old  Female who presents with a chief complaint of fever, malaise (01 Oct 2018 12:10)    24 hour events: Pt began c/o diffuse abdominal pain, > in RUQ, generalized ill feeling, became tachypneic to the 40's and hypotensive with SBP in 70's.  STAT ABG noted to have severe metabolic acidosis with pH 7.18/28/67/-17.0/89%, and LA 10.4.  Pt subsequently and intubated 2/2 increased work of breathing and tachypnea in the setting of respiratory compensation with severe HAGMA metabolic.  Pt was started on high dose levophed gtt for HD BP support, given 3L LR bolus, abx changed from zosyn to meropenem, given 2 amps of bicarb then started bicarb drip, LIJ central line and arterial line placed, and pt started on vasopressin for BP/renal support.  UOP poor at this time. Niece called and made aware of events.         PAST MEDICAL & SURGICAL HISTORY:  HTN (hypertension)  DM (diabetes mellitus)  Breast cancer  H/O lumpectomy      Review of Systems:  ROS not able to be obtained as pt is intubated and sedated     T(F): 96.8 (10-02-18 @ 00:21), Max: 99.3 (10-01-18 @ 11:49)  HR: 116 (10-02-18 @ 04:00) (85 - 127)  BP: 133/116 (10-02-18 @ 04:00) (56/30 - 159/117)  RR: 24 (10-02-18 @ 04:00) (16 - 38)  SpO2: 100% (10-02-18 @ 04:00) (94% - 100%)  Wt(kg): --    Mode: AC/ CMV (Assist Control/ Continuous Mandatory Ventilation), RR (machine): 24, TV (machine): 450, FiO2: 100, PEEP: 5    CAPILLARY BLOOD GLUCOSE      POCT Blood Glucose.: 290 mg/dL (02 Oct 2018 02:08)      I&O's Summary    30 Sep 2018 07:01  -  01 Oct 2018 07:00  --------------------------------------------------------  IN: 6.5 mL / OUT: 0 mL / NET: 6.5 mL    01 Oct 2018 07:01  -  02 Oct 2018 05:20  --------------------------------------------------------  IN: 7484.1 mL / OUT: 1100 mL / NET: 6384.1 mL        Physical Exam:     Gen: intubated and sedated, moves extremities and awakens off sedation   Neuro: A&Ox3, non-focal moves extremities and awakens off sedation   HEENT: NC/AT  Resp: CTA b/l  CVS: nl S1/S2, RRR  Abd: distended and firm, BS distant to ausculation   Ext: no edema, +pulses, distal extremities cold to touch  Skin: skin in distal extremities poorly perfused     Meds:  meropenem  IVPB IV Intermittent  metroNIDAZOLE  IVPB   metroNIDAZOLE  IVPB IV Intermittent  vancomycin  IVPB   vancomycin  IVPB IV Intermittent  midodrine Oral  norepinephrine Infusion IV Continuous  dextrose 40% Gel Oral PRN  dextrose 50% Injectable IV Push  dextrose 50% Injectable IV Push  dextrose 50% Injectable IV Push  glucagon  Injectable IntraMuscular PRN  insulin lispro (HumaLOG) corrective regimen sliding scale SubCutaneous  insulin lispro (HumaLOG) corrective regimen sliding scale SubCutaneous  vasopressin Infusion IV Continuous  ALBUTerol    0.083% Nebulizer  dexmedetomidine Infusion IV Continuous  propofol Infusion IV Continuous  pantoprazole    Tablet Oral  dextrose 5%. IV Continuous  lactated ringers. IV Continuous  lactated ringers. IV Continuous  sodium bicarbonate  Infusion IV Continuous  chlorhexidine 0.12% Liquid Swish and Spit                              11.3   16.22 )-----------( 69       ( 02 Oct 2018 02:38 )             37.8     Bands 14.0    10-02    147<H>  |  116<H>  |  22  ----------------------------<  284<H>  4.1   |  11<L>  |  2.19<H>    Ca    7.6<L>      02 Oct 2018 02:38  Phos  4.0     10-  Mg     2.0     10-    TPro  5.3<L>  /  Alb  2.4<L>  /  TBili  1.1  /  DBili  .77<H>  /  AST  85<H>  /  ALT  80<H>  /  AlkPhos  74  10-02    Lactate 10.4           10-02 @ 02:40    Lactate 8.2           10-01 @ 10:24    Lactate 8.1           10-01 @ 05:44      CARDIAC MARKERS ( 01 Oct 2018 04:24 )  .044 ng/mL / x     / 149 U/L / x     / 1.5 ng/mL  CARDIAC MARKERS ( 30 Sep 2018 20:39 )  <.015 ng/mL / x     / 90 U/L / x     / <1.0 ng/mL      PT/INR - ( 02 Oct 2018 02:38 )   PT: 27.5 sec;   INR: 2.48 ratio         PTT - ( 02 Oct 2018 02:38 )  PTT:44.1 sec  Urinalysis Basic - ( 01 Oct 2018 12:54 )    Color: Yellow / Appearance: Clear / S.005 / pH: x  Gluc: x / Ketone: Negative  / Bili: Negative / Urobili: Negative mg/dL   Blood: x / Protein: 30 mg/dL / Nitrite: Negative   Leuk Esterase: Negative / RBC: 0-2 /HPF / WBC x   Sq Epi: x / Non Sq Epi: Occasional / Bacteria: Occasional      .Blood Blood   Growth in aerobic bottle: Gram Negative Coccobacilli  "Due to technical problems, Proteus sp. will Not be reported as part of  the BCID panel until further notice"  ***Blood Panel PCR results on this specimen are available  approximately 3 hours after the Gram stain result.***  Gram stain, PCR, and/or culture results may not always  correspond due to difference in methodologies.  ************************************************************  This PCR assay was performed using [employer].  The following targets are tested for: Enterococcus,  vancomycin resistant enterococci, Listeria monocytogenes,  coagulase negative staphylococci, S. aureus,  methicillin resistant S. aureus, Streptococcus agalactiae  (Group B), S. pneumoniae, S. pyogenes (Group A),  Acinetobacter baumannii, Enterobacter cloacae, E. coli,  Klebsiella oxytoca, K. pneumoniae, Proteus sp.,  Serratia marcescens, Haemophilus influenzae,  Neisseria meningitidis, Pseudomonas aeruginosa, Candida  albicans, C. glabrata, C krusei, C parapsilosis,  C. tropicalis and the KPC resistance gene.   Growth in aerobic bottle: Gram Negative Coccobacilli  @ 22:52      Rapid RVP Result: Not Detec ( @ 22:41)      Radiology: pending     Bedside lung ultrasound: B-lines throughout anteriorly, no noted pleural effusions at the bases, ?right consolidation vs. atelectasis at R base    Bedside ECHO: LV contractility decreased from earlier in shift, small pericardial effusion, no signs of RV strain or septal flattening.       CENTRAL LINE: Y          DATE INSERTED: 10/2/18             REMOVE: Y/N    SHAH: Y                       DATE INSERTED:  10/2/18            REMOVE: Y/N    A-LINE: Y                       DATE INSERTED:  10/2/18            REMOVE: Y/N    GLOBAL ISSUE/BEST PRACTICE:  Analgesia: Propofol  Sedation: Propofol/precedex  HOB elevation: yes  Stress ulcer prophylaxis: protonix   VTE prophylaxis: SCD  Glycemic control: N/A  Nutrition: NPO      CODE STATUS: Full Code   GO discussion: Y    ASSESSMENT:   73 y/o F w/hx of breast cancer, DM on metformin presenting with severe sepsis with septic shock, lactic acidosis with ?metformin toxicity, became tachypneic to the 40's, subsequently and intubated 2/2 increased work of breathing and tachypnea in the setting of respiratory compensation with severe HAGMA metabolic on Bicarb gtt, lactic acidosis, cardiogenic vs. septic shock on dual pressors with GN bacteremia, HERBIE likely in the setting of ischemic ATN.      PLAN     1.  Hypoxic respiratory Failure with tachypnea and increased WOB, now intubated on MV   -Patient currently on Full vent support  -sedated on propofol and precedex   -titrate settings to maintain SaO2 >90%, or pH >7.25  -Goal Vt 6-8 cc/kg of ideal body weight  -plateau pressure goal <30  -Peridex oral care and VAP prophylaxis with HOB 30 degrees   -aggressive chest PT and suctioning   -daily sedation vacation with spontaneous breathing trial if clinical condition warrants, discuss with respiratory therapy    2.  Sepsis/septic shock 2/2 to GN bacteremia, unknown source at this time, ?acute abdomen   -CT scan to be performed when stable   -Abdominal U/S in the setting of c/o RUQ during acute event   -30 cc/kg fluid challenge without improvement in blood pressure  -patient currently on Levophed, will titrate for MAP 65-70, and Vaso   -repeat lactate  -blood/urine/sputum cultures, then initiate broad spectrum antibiotics  -Pt now on Meropenam, Vanco, Tobra X1   -follow cultures and narrow antibiotics as able  -goal UOP >0.5 cc/kg/hr  -procalcitonin    3.  Cardiogenic Shock, 2/2 ?NSTEMI vs. MSOF in the setting of septic shock   -Bedside POCUS performed at the bedside with notable decrease in LV function compared to earlier in night  -CE's sent will trend if elevated   -Currently on Levophed for inotropic support   -Cardiology c/s   -TTE    4. Severe HAGMA on bicarb gtt             Critical Care time: 75 min assessing presenting problems of acute illness that poses high probability of life threatening deterioration or end organ damage/dysfunction.  Medical decision making including Initiating plan of care, reviewing data, reviewing radiology, direct patient bedside evaluation and interpretation of vital signs, any necessary ventilator management , discussion with multidisciplinary team, discussing goals of care with patient/family, all non inclusive of procedures Patient is a 72y old  Female who presents with a chief complaint of fever, malaise (01 Oct 2018 12:10)    24 hour events: Pt began c/o diffuse abdominal pain, > in RUQ, generalized ill feeling, became tachypneic to the 40's and hypotensive with SBP in 70's.  STAT ABG noted to have severe metabolic acidosis with pH 7.18/28/67/-17.0/89%, and LA 10.4.  Pt subsequently and intubated 2/2 increased work of breathing and tachypnea in the setting of respiratory compensation with severe HAGMA metabolic.  Pt was started on high dose levophed gtt for HD BP support, given 3L LR bolus, abx changed from zosyn to meropenem, given 2 amps of bicarb then started bicarb drip, LIJ central line and arterial line placed, and pt started on vasopressin for BP/renal support.  UOP poor at this time. Niece called and made aware of events.         PAST MEDICAL & SURGICAL HISTORY:  HTN (hypertension)  DM (diabetes mellitus)  Breast cancer  H/O lumpectomy      Review of Systems:  ROS not able to be obtained as pt is intubated and sedated     T(F): 96.8 (10-02-18 @ 00:21), Max: 99.3 (10-01-18 @ 11:49)  HR: 116 (10-02-18 @ 04:00) (85 - 127)  BP: 133/116 (10-02-18 @ 04:00) (56/30 - 159/117)  RR: 24 (10-02-18 @ 04:00) (16 - 38)  SpO2: 100% (10-02-18 @ 04:00) (94% - 100%)  Wt(kg): --    Mode: AC/ CMV (Assist Control/ Continuous Mandatory Ventilation), RR (machine): 24, TV (machine): 450, FiO2: 100, PEEP: 5    CAPILLARY BLOOD GLUCOSE      POCT Blood Glucose.: 290 mg/dL (02 Oct 2018 02:08)      I&O's Summary    30 Sep 2018 07:01  -  01 Oct 2018 07:00  --------------------------------------------------------  IN: 6.5 mL / OUT: 0 mL / NET: 6.5 mL    01 Oct 2018 07:01  -  02 Oct 2018 05:20  --------------------------------------------------------  IN: 7484.1 mL / OUT: 1100 mL / NET: 6384.1 mL        Physical Exam:     Gen: intubated and sedated, moves extremities and awakens off sedation   Neuro: A&Ox3, non-focal moves extremities and awakens off sedation   HEENT: NC/AT  Resp: CTA b/l  CVS: nl S1/S2, RRR  Abd: distended and firm, BS distant to ausculation   Ext: no edema, +pulses, distal extremities cold to touch  Skin: skin in distal extremities poorly perfused     Meds:  meropenem  IVPB IV Intermittent  metroNIDAZOLE  IVPB   metroNIDAZOLE  IVPB IV Intermittent  vancomycin  IVPB   vancomycin  IVPB IV Intermittent  midodrine Oral  norepinephrine Infusion IV Continuous  dextrose 40% Gel Oral PRN  dextrose 50% Injectable IV Push  dextrose 50% Injectable IV Push  dextrose 50% Injectable IV Push  glucagon  Injectable IntraMuscular PRN  insulin lispro (HumaLOG) corrective regimen sliding scale SubCutaneous  insulin lispro (HumaLOG) corrective regimen sliding scale SubCutaneous  vasopressin Infusion IV Continuous  ALBUTerol    0.083% Nebulizer  dexmedetomidine Infusion IV Continuous  propofol Infusion IV Continuous  pantoprazole    Tablet Oral  dextrose 5%. IV Continuous  lactated ringers. IV Continuous  lactated ringers. IV Continuous  sodium bicarbonate  Infusion IV Continuous  chlorhexidine 0.12% Liquid Swish and Spit                              11.3   16.22 )-----------( 69       ( 02 Oct 2018 02:38 )             37.8     Bands 14.0    10-02    147<H>  |  116<H>  |  22  ----------------------------<  284<H>  4.1   |  11<L>  |  2.19<H>    Ca    7.6<L>      02 Oct 2018 02:38  Phos  4.0     10-  Mg     2.0     10-    TPro  5.3<L>  /  Alb  2.4<L>  /  TBili  1.1  /  DBili  .77<H>  /  AST  85<H>  /  ALT  80<H>  /  AlkPhos  74  10-02    Lactate 10.4           10-02 @ 02:40    Lactate 8.2           10-01 @ 10:24    Lactate 8.1           10-01 @ 05:44      CARDIAC MARKERS ( 01 Oct 2018 04:24 )  .044 ng/mL / x     / 149 U/L / x     / 1.5 ng/mL  CARDIAC MARKERS ( 30 Sep 2018 20:39 )  <.015 ng/mL / x     / 90 U/L / x     / <1.0 ng/mL      PT/INR - ( 02 Oct 2018 02:38 )   PT: 27.5 sec;   INR: 2.48 ratio         PTT - ( 02 Oct 2018 02:38 )  PTT:44.1 sec  Urinalysis Basic - ( 01 Oct 2018 12:54 )    Color: Yellow / Appearance: Clear / S.005 / pH: x  Gluc: x / Ketone: Negative  / Bili: Negative / Urobili: Negative mg/dL   Blood: x / Protein: 30 mg/dL / Nitrite: Negative   Leuk Esterase: Negative / RBC: 0-2 /HPF / WBC x   Sq Epi: x / Non Sq Epi: Occasional / Bacteria: Occasional      .Blood Blood   Growth in aerobic bottle: Gram Negative Coccobacilli  "Due to technical problems, Proteus sp. will Not be reported as part of  the BCID panel until further notice"  ***Blood Panel PCR results on this specimen are available  approximately 3 hours after the Gram stain result.***  Gram stain, PCR, and/or culture results may not always  correspond due to difference in methodologies.  ************************************************************  This PCR assay was performed using [employer].  The following targets are tested for: Enterococcus,  vancomycin resistant enterococci, Listeria monocytogenes,  coagulase negative staphylococci, S. aureus,  methicillin resistant S. aureus, Streptococcus agalactiae  (Group B), S. pneumoniae, S. pyogenes (Group A),  Acinetobacter baumannii, Enterobacter cloacae, E. coli,  Klebsiella oxytoca, K. pneumoniae, Proteus sp.,  Serratia marcescens, Haemophilus influenzae,  Neisseria meningitidis, Pseudomonas aeruginosa, Candida  albicans, C. glabrata, C krusei, C parapsilosis,  C. tropicalis and the KPC resistance gene.   Growth in aerobic bottle: Gram Negative Coccobacilli  @ 22:52      Rapid RVP Result: Not Detec ( @ 22:41)      Radiology: pending     Bedside lung ultrasound: B-lines throughout anteriorly, no noted pleural effusions at the bases, ?right consolidation vs. atelectasis at R base    Bedside ECHO: LV contractility decreased from earlier in shift, small pericardial effusion, no signs of RV strain or septal flattening.       CENTRAL LINE: Y          DATE INSERTED: 10/2/18             REMOVE: Y/N    SHAH: Y                       DATE INSERTED:  10/2/18            REMOVE: Y/N    A-LINE: Y                       DATE INSERTED:  10/2/18            REMOVE: Y/N    GLOBAL ISSUE/BEST PRACTICE:  Analgesia: Propofol  Sedation: Propofol/precedex  HOB elevation: yes  Stress ulcer prophylaxis: protonix   VTE prophylaxis: SCD  Glycemic control: N/A  Nutrition: NPO      CODE STATUS: Full Code   GO discussion: Y    ASSESSMENT:   73 y/o F w/hx of breast cancer, DM on metformin presenting with severe sepsis with septic shock, lactic acidosis with ?metformin toxicity, became tachypneic to the 40's, subsequently and intubated 2/2 increased work of breathing and tachypnea in the setting of respiratory compensation with severe HAGMA metabolic on Bicarb gtt, lactic acidosis, cardiogenic vs. septic shock on dual pressors with GN bacteremia, HERBIE likely in the setting of ischemic ATN.      PLAN     1.  Hypoxic respiratory Failure with tachypnea and increased WOB, now intubated on MV   -Patient currently on Full vent support  -sedated on propofol and precedex   -titrate settings to maintain SaO2 >90%, or pH >7.25  -Goal Vt 6-8 cc/kg of ideal body weight  -plateau pressure goal <30  -Peridex oral care and VAP prophylaxis with HOB 30 degrees   -aggressive chest PT and suctioning   -daily sedation vacation with spontaneous breathing trial if clinical condition warrants, discuss with respiratory therapy    2.  Sepsis/septic shock 2/2 to GN bacteremia, unknown source at this time, ?acute abdomen   -CT scan to be performed when stable   -Abdominal U/S in the setting of c/o RUQ during acute event   -30 cc/kg fluid challenge without improvement in blood pressure  -patient currently on Levophed, will titrate for MAP 65-70, and Vaso   -repeat lactate  -blood/urine/sputum cultures, then initiate broad spectrum antibiotics  -Pt now on Meropenam, Vanco, Tobra X1   -follow cultures and narrow antibiotics as able  -goal UOP >0.5 cc/kg/hr  -procalcitonin    3.  Cardiogenic Shock, 2/2 ?NSTEMI vs. MSOF in the setting of septic shock   -Bedside POCUS performed at the bedside with notable decrease in LV function compared to earlier in night  -CE's sent will trend if elevated   -Currently on Levophed for inotropic support   -Cardiology c/s   -TTE    4. Severe HAGMA with lactic acidosis 2/2 to poor tissue perfusion in the setting of septic/cardiogenic shock, ?metformin toxicity   -Pt noted to have acute change in RR, tachypneic to the 40's likely 2/2 to respiratory compensation   -Pt noted to have LA 10.4 and and HCO3 of 10, ABG 7.18/28/67/10/-17/89, corrected AG of 24 (for albumin of 2.4), delta gap 16, corrected Bicarb of -6           Critical Care time: 75 min assessing presenting problems of acute illness that poses high probability of life threatening deterioration or end organ damage/dysfunction.  Medical decision making including Initiating plan of care, reviewing data, reviewing radiology, direct patient bedside evaluation and interpretation of vital signs, any necessary ventilator management , discussion with multidisciplinary team, discussing goals of care with patient/family, all non inclusive of procedures Patient is a 72y old  Female who presents with a chief complaint of fever, malaise (01 Oct 2018 12:10)    24 hour events: Pt began c/o diffuse abdominal pain, > in RUQ, generalized ill feeling, became tachypneic to the 40's and hypotensive with SBP in 70's.  STAT ABG noted to have severe metabolic acidosis with pH 7.18/28/67/-17.0/89%, and LA 10.4.  Pt subsequently and intubated 2/2 increased work of breathing and tachypnea in the setting of respiratory compensation with severe HAGMA metabolic.  Pt was started on high dose levophed gtt for HD BP support, given 3L LR bolus, abx changed from zosyn to meropenem, given 2 amps of bicarb then started bicarb drip, LIJ central line and arterial line placed, and pt started on vasopressin for BP/renal support.  UOP poor at this time. Niece called and made aware of events.         PAST MEDICAL & SURGICAL HISTORY:  HTN (hypertension)  DM (diabetes mellitus)  Breast cancer  H/O lumpectomy      Review of Systems:  ROS not able to be obtained as pt is intubated and sedated     T(F): 96.8 (10-02-18 @ 00:21), Max: 99.3 (10-01-18 @ 11:49)  HR: 116 (10-02-18 @ 04:00) (85 - 127)  BP: 133/116 (10-02-18 @ 04:00) (56/30 - 159/117)  RR: 24 (10-02-18 @ 04:00) (16 - 38)  SpO2: 100% (10-02-18 @ 04:00) (94% - 100%)  Wt(kg): --    Mode: AC/ CMV (Assist Control/ Continuous Mandatory Ventilation), RR (machine): 24, TV (machine): 450, FiO2: 100, PEEP: 5    CAPILLARY BLOOD GLUCOSE      POCT Blood Glucose.: 290 mg/dL (02 Oct 2018 02:08)      I&O's Summary    30 Sep 2018 07:01  -  01 Oct 2018 07:00  --------------------------------------------------------  IN: 6.5 mL / OUT: 0 mL / NET: 6.5 mL    01 Oct 2018 07:01  -  02 Oct 2018 05:20  --------------------------------------------------------  IN: 7484.1 mL / OUT: 1100 mL / NET: 6384.1 mL        Physical Exam:     Gen: intubated and sedated, moves extremities and awakens off sedation   Neuro: A&Ox3, non-focal moves extremities and awakens off sedation   HEENT: NC/AT  Resp: CTA b/l  CVS: nl S1/S2, RRR  Abd: distended and firm, BS distant to ausculation   Ext: no edema, +pulses, distal extremities cold to touch  Skin: skin in distal extremities poorly perfused     Meds:  meropenem  IVPB IV Intermittent  metroNIDAZOLE  IVPB   metroNIDAZOLE  IVPB IV Intermittent  vancomycin  IVPB   vancomycin  IVPB IV Intermittent  midodrine Oral  norepinephrine Infusion IV Continuous  dextrose 40% Gel Oral PRN  dextrose 50% Injectable IV Push  dextrose 50% Injectable IV Push  dextrose 50% Injectable IV Push  glucagon  Injectable IntraMuscular PRN  insulin lispro (HumaLOG) corrective regimen sliding scale SubCutaneous  insulin lispro (HumaLOG) corrective regimen sliding scale SubCutaneous  vasopressin Infusion IV Continuous  ALBUTerol    0.083% Nebulizer  dexmedetomidine Infusion IV Continuous  propofol Infusion IV Continuous  pantoprazole    Tablet Oral  dextrose 5%. IV Continuous  lactated ringers. IV Continuous  lactated ringers. IV Continuous  sodium bicarbonate  Infusion IV Continuous  chlorhexidine 0.12% Liquid Swish and Spit                              11.3   16.22 )-----------( 69       ( 02 Oct 2018 02:38 )             37.8     Bands 14.0    10-02    147<H>  |  116<H>  |  22  ----------------------------<  284<H>  4.1   |  11<L>  |  2.19<H>    Ca    7.6<L>      02 Oct 2018 02:38  Phos  4.0     10-  Mg     2.0     10-    TPro  5.3<L>  /  Alb  2.4<L>  /  TBili  1.1  /  DBili  .77<H>  /  AST  85<H>  /  ALT  80<H>  /  AlkPhos  74  10-02    Lactate 10.4           10-02 @ 02:40    Lactate 8.2           10-01 @ 10:24    Lactate 8.1           10-01 @ 05:44      CARDIAC MARKERS ( 01 Oct 2018 04:24 )  .044 ng/mL / x     / 149 U/L / x     / 1.5 ng/mL  CARDIAC MARKERS ( 30 Sep 2018 20:39 )  <.015 ng/mL / x     / 90 U/L / x     / <1.0 ng/mL      PT/INR - ( 02 Oct 2018 02:38 )   PT: 27.5 sec;   INR: 2.48 ratio         PTT - ( 02 Oct 2018 02:38 )  PTT:44.1 sec  Urinalysis Basic - ( 01 Oct 2018 12:54 )    Color: Yellow / Appearance: Clear / S.005 / pH: x  Gluc: x / Ketone: Negative  / Bili: Negative / Urobili: Negative mg/dL   Blood: x / Protein: 30 mg/dL / Nitrite: Negative   Leuk Esterase: Negative / RBC: 0-2 /HPF / WBC x   Sq Epi: x / Non Sq Epi: Occasional / Bacteria: Occasional      .Blood Blood   Growth in aerobic bottle: Gram Negative Coccobacilli  "Due to technical problems, Proteus sp. will Not be reported as part of  the BCID panel until further notice"  ***Blood Panel PCR results on this specimen are available  approximately 3 hours after the Gram stain result.***  Gram stain, PCR, and/or culture results may not always  correspond due to difference in methodologies.  ************************************************************  This PCR assay was performed using "[employer].  The following targets are tested for: Enterococcus,  vancomycin resistant enterococci, Listeria monocytogenes,  coagulase negative staphylococci, S. aureus,  methicillin resistant S. aureus, Streptococcus agalactiae  (Group B), S. pneumoniae, S. pyogenes (Group A),  Acinetobacter baumannii, Enterobacter cloacae, E. coli,  Klebsiella oxytoca, K. pneumoniae, Proteus sp.,  Serratia marcescens, Haemophilus influenzae,  Neisseria meningitidis, Pseudomonas aeruginosa, Candida  albicans, C. glabrata, C krusei, C parapsilosis,  C. tropicalis and the KPC resistance gene.   Growth in aerobic bottle: Gram Negative Coccobacilli  @ 22:52      Rapid RVP Result: Not Detec ( @ 22:41)      Radiology: pending     Bedside lung ultrasound: B-lines throughout anteriorly, no noted pleural effusions at the bases, ?right consolidation vs. atelectasis at R base    Bedside ECHO: LV contractility decreased from earlier in shift, small pericardial effusion, no signs of RV strain or septal flattening.       CENTRAL LINE: Y          DATE INSERTED: 10/2/18             REMOVE: Y/N    SHAH: Y                       DATE INSERTED:  10/2/18            REMOVE: Y/N    A-LINE: Y                       DATE INSERTED:  10/2/18            REMOVE: Y/N    GLOBAL ISSUE/BEST PRACTICE:  Analgesia: Propofol  Sedation: Propofol/precedex  HOB elevation: yes  Stress ulcer prophylaxis: protonix   VTE prophylaxis: SCD  Glycemic control: N/A  Nutrition: NPO      CODE STATUS: Full Code   GO discussion: Y    ASSESSMENT:   71 y/o F w/hx of breast cancer, DM on metformin presenting with severe sepsis with septic shock, lactic acidosis with ?metformin toxicity, became tachypneic to the 40's, subsequently and intubated 2/2 increased work of breathing and tachypnea in the setting of respiratory compensation with severe HAGMA metabolic on Bicarb gtt, lactic acidosis, cardiogenic vs. septic shock on dual pressors with GN bacteremia, HERBIE likely in the setting of ischemic ATN.      PLAN     1.  Hypoxic respiratory Failure with tachypnea and increased WOB, now intubated on MV   -Patient currently on Full vent support  -sedated on propofol and precedex   -titrate settings to maintain SaO2 >90%, or pH >7.25  -Goal Vt 6-8 cc/kg of ideal body weight  -plateau pressure goal <30  -Peridex oral care and VAP prophylaxis with HOB 30 degrees   -aggressive chest PT and suctioning   -daily sedation vacation with spontaneous breathing trial if clinical condition warrants, discuss with respiratory therapy    2.  Sepsis/septic shock 2/2 to GN bacteremia, unknown source at this time, ?acute abdomen   -CT scan to be performed when stable   -Abdominal U/S in the setting of c/o RUQ during acute event   -30 cc/kg fluid challenge without improvement in blood pressure  -patient currently on Levophed, will titrate for MAP 65-70, and Vaso   -repeat lactate  -blood/urine/sputum cultures, then initiate broad spectrum antibiotics  -Pt now on Meropenam, Vanco, Tobra X1   -follow cultures and narrow antibiotics as able  -goal UOP >0.5 cc/kg/hr  -procalcitonin    3.  Cardiogenic Shock, 2/2 ?NSTEMI vs. MSOF in the setting of septic shock   -Bedside POCUS performed at the bedside with notable decrease in LV function compared to earlier in night  -CE's sent will trend if elevated   -Currently on Levophed for inotropic support   -Cardiology c/s   -TTE    4. Severe HAGMA with lactic acidosis 2/2 to poor tissue perfusion in the setting of septic/cardiogenic shock, ?metformin toxicity   -Pt noted to have acute change in RR, tachypneic to the 40's likely 2/2 to respiratory compensation   -Pt noted to have LA 10.4 and HCO3 of 10, ABG 7.18/28/67/10/-17/89, corrected AG of 24 (for albumin of 2.4), delta gap 16, corrected Bicarb of -6           Critical Care time: 75 min assessing presenting problems of acute illness that poses high probability of life threatening deterioration or end organ damage/dysfunction.  Medical decision making including Initiating plan of care, reviewing data, reviewing radiology, direct patient bedside evaluation and interpretation of vital signs, any necessary ventilator management , discussion with multidisciplinary team, discussing goals of care with patient/family, all non inclusive of procedures Patient is a 72y old  Female who presents with a chief complaint of fever, malaise (01 Oct 2018 12:10)    24 hour events: Pt began c/o diffuse abdominal pain, > in RUQ, generalized ill feeling, became tachypneic to the 40's and hypotensive with SBP in 70's.  STAT ABG noted to have severe metabolic acidosis with pH 7.18/28/67/-17.0/89%, and LA 10.4.  Pt subsequently and intubated 2/2 increased work of breathing and tachypnea in the setting of respiratory compensation with severe HAGMA metabolic.  Pt was started on high dose levophed gtt for HD BP support, given 3L LR bolus, abx changed from zosyn to meropenem, given 2 amps of bicarb then started bicarb drip, LIJ central line and arterial line placed, and pt started on vasopressin for BP/renal support.  UOP poor at this time. Niece called and made aware of events.         PAST MEDICAL & SURGICAL HISTORY:  HTN (hypertension)  DM (diabetes mellitus)  Breast cancer  H/O lumpectomy      Review of Systems:  ROS not able to be obtained as pt is intubated and sedated     T(F): 96.8 (10-02-18 @ 00:21), Max: 99.3 (10-01-18 @ 11:49)  HR: 116 (10-02-18 @ 04:00) (85 - 127)  BP: 133/116 (10-02-18 @ 04:00) (56/30 - 159/117)  RR: 24 (10-02-18 @ 04:00) (16 - 38)  SpO2: 100% (10-02-18 @ 04:00) (94% - 100%)  Wt(kg): --    Mode: AC/ CMV (Assist Control/ Continuous Mandatory Ventilation), RR (machine): 24, TV (machine): 450, FiO2: 100, PEEP: 5    CAPILLARY BLOOD GLUCOSE      POCT Blood Glucose.: 290 mg/dL (02 Oct 2018 02:08)    I&O's Summary    30 Sep 2018 07:01  -  01 Oct 2018 07:00  --------------------------------------------------------  IN: 6.5 mL / OUT: 0 mL / NET: 6.5 mL    01 Oct 2018 07:01  -  02 Oct 2018 05:20  --------------------------------------------------------  IN: 7484.1 mL / OUT: 1100 mL / NET: 6384.1 mL    Physical Exam:     Gen: intubated and sedated, moves extremities and awakens off sedation   Neuro: A&Ox3, non-focal moves extremities and awakens off sedation   HEENT: NC/AT  Resp: CTA b/l  CVS: nl S1/S2, RRR  Abd: distended and firm, BS distant to ausculation   Ext: no edema, +pulses, distal extremities cold to touch  Skin: skin in distal extremities poorly perfused     Meds:  meropenem  IVPB IV Intermittent  metroNIDAZOLE  IVPB   metroNIDAZOLE  IVPB IV Intermittent  vancomycin  IVPB   vancomycin  IVPB IV Intermittent  midodrine Oral  norepinephrine Infusion IV Continuous  dextrose 40% Gel Oral PRN  dextrose 50% Injectable IV Push  dextrose 50% Injectable IV Push  dextrose 50% Injectable IV Push  glucagon  Injectable IntraMuscular PRN  insulin lispro (HumaLOG) corrective regimen sliding scale SubCutaneous  insulin lispro (HumaLOG) corrective regimen sliding scale SubCutaneous  vasopressin Infusion IV Continuous  ALBUTerol    0.083% Nebulizer  dexmedetomidine Infusion IV Continuous  propofol Infusion IV Continuous  pantoprazole    Tablet Oral  dextrose 5%. IV Continuous  lactated ringers. IV Continuous  lactated ringers. IV Continuous  sodium bicarbonate  Infusion IV Continuous  chlorhexidine 0.12% Liquid Swish and Spit                              11.3   16.22 )-----------( 69       ( 02 Oct 2018 02:38 )             37.8     Bands 14.0    10-02    147<H>  |  116<H>  |  22  ----------------------------<  284<H>  4.1   |  11<L>  |  2.19<H>    Ca    7.6<L>      02 Oct 2018 02:38  Phos  4.0     10-  Mg     2.0     10-    TPro  5.3<L>  /  Alb  2.4<L>  /  TBili  1.1  /  DBili  .77<H>  /  AST  85<H>  /  ALT  80<H>  /  AlkPhos  74  10-02    Lactate 10.4           10-02 @ 02:40    Lactate 8.2           10-01 @ 10:24    Lactate 8.1           10-01 @ 05:44      CARDIAC MARKERS ( 01 Oct 2018 04:24 )  .044 ng/mL / x     / 149 U/L / x     / 1.5 ng/mL  CARDIAC MARKERS ( 30 Sep 2018 20:39 )  <.015 ng/mL / x     / 90 U/L / x     / <1.0 ng/mL      PT/INR - ( 02 Oct 2018 02:38 )   PT: 27.5 sec;   INR: 2.48 ratio         PTT - ( 02 Oct 2018 02:38 )  PTT:44.1 sec  Urinalysis Basic - ( 01 Oct 2018 12:54 )    Color: Yellow / Appearance: Clear / S.005 / pH: x  Gluc: x / Ketone: Negative  / Bili: Negative / Urobili: Negative mg/dL   Blood: x / Protein: 30 mg/dL / Nitrite: Negative   Leuk Esterase: Negative / RBC: 0-2 /HPF / WBC x   Sq Epi: x / Non Sq Epi: Occasional / Bacteria: Occasional      .Blood Blood   Growth in aerobic bottle: Gram Negative Coccobacilli  "Due to technical problems, Proteus sp. will Not be reported as part of  the BCID panel until further notice"  ***Blood Panel PCR results on this specimen are available  approximately 3 hours after the Gram stain result.***  Gram stain, PCR, and/or culture results may not always  correspond due to difference in methodologies.  ************************************************************  This PCR assay was performed using [employer].  The following targets are tested for: Enterococcus,  vancomycin resistant enterococci, Listeria monocytogenes,  coagulase negative staphylococci, S. aureus,  methicillin resistant S. aureus, Streptococcus agalactiae  (Group B), S. pneumoniae, S. pyogenes (Group A),  Acinetobacter baumannii, Enterobacter cloacae, E. coli,  Klebsiella oxytoca, K. pneumoniae, Proteus sp.,  Serratia marcescens, Haemophilus influenzae,  Neisseria meningitidis, Pseudomonas aeruginosa, Candida  albicans, C. glabrata, C krusei, C parapsilosis,  C. tropicalis and the KPC resistance gene.   Growth in aerobic bottle: Gram Negative Coccobacilli  @ 22:52      Rapid RVP Result: Not Detec ( @ 22:41)      Radiology: pending     Bedside lung ultrasound: B-lines throughout anteriorly, no noted pleural effusions at the bases, ?right consolidation vs. atelectasis at R base    Bedside ECHO: LV contractility decreased from earlier in shift, small pericardial effusion, no signs of RV strain or septal flattening.       CENTRAL LINE: Y          DATE INSERTED: 10/2/18             REMOVE: Y/N    BRANCH: Y                       DATE INSERTED:  10/2/18            REMOVE: Y/N    A-LINE: Y                       DATE INSERTED:  10/2/18            REMOVE: Y/N    GLOBAL ISSUE/BEST PRACTICE:  Analgesia: Propofol  Sedation: Propofol/precedex  HOB elevation: yes  Stress ulcer prophylaxis: protonix   VTE prophylaxis: SCD  Glycemic control: N/A  Nutrition: NPO      CODE STATUS: Full Code   GO discussion: Y    ASSESSMENT:   71 y/o F w/hx of breast cancer, DM on metformin presenting with severe sepsis with septic shock, lactic acidosis with ?metformin toxicity, became tachypneic to the 40's, subsequently and intubated 2/2 increased work of breathing and tachypnea in the setting of respiratory compensation with severe HAGMA metabolic on Bicarb gtt, lactic acidosis, cardiogenic vs. septic shock on dual pressors with GN bacteremia, HERBIE likely in the setting of ischemic ATN.      PLAN     1.  Hypoxic respiratory Failure with tachypnea and increased WOB, now intubated on MV   -Patient currently on Full vent support  -sedated on propofol and precedex   -titrate settings to maintain SaO2 >90%, or pH >7.25  -Goal Vt 6-8 cc/kg of ideal body weight  -plateau pressure goal <30  -Peridex oral care and VAP prophylaxis with HOB 30 degrees   -aggressive chest PT and suctioning   -daily sedation vacation with spontaneous breathing trial if clinical condition warrants, discuss with respiratory therapy    2.  Sepsis/septic shock 2/2 to GN bacteremia, unknown source at this time, ?acute abdomen   -CT scan to be performed when stable   -Abdominal U/S in the setting of c/o RUQ during acute event   -30 cc/kg fluid challenge without improvement in blood pressure s/p 3 L IVF bolus of LR  -patient currently on Levophed, will titrate for MAP 65-70, and Vaso   -repeat and trend LA   -blood/urine/sputum culture sent previously when admitted.  Bcx growing GN coccobacillus  -broad spectrum antibiotics initiated, pt started on Meropenam, Vanco, Tobra X1   -follow cultures and narrow antibiotics as able  -goal UOP >0.5 cc/kg/hr  -procalcitonin sent   -Arterial/TLC placed for HD monitoring and resuscitation   -LFT's on the rise with low platelets and elevated INR, pt likely going into shock liver state  -Will consider stress dose steroids     3. Cardiogenic Shock, 2/2 ?NSTEMI vs. MSOF in the setting of septic shock   -Bedside POCUS performed at the bedside with notable decrease in LV function compared to earlier in night  -CE's sent will trend if elevated   -Currently on Levophed for inotropic support   -Cardiology c/s   -TTE    4. Severe HAGMA with lactic acidosis 2/2 to poor tissue perfusion in the setting of septic/cardiogenic shock, ?metformin toxicity   -Pt noted to have acute change in RR, tachypneic to the 40's likely 2/2 to respiratory compensation   -Pt noted to have LA 10.4 and HCO3 of 10, ABG 7.18/28/67/10/-17/89, corrected AG of 24 (for albumin of 2.4), delta gap 12, ratio of 0.85 (likely in the setting of renal failure)  -2 amps Bicarb given and started on Bicarb gtt   -Will repeat ABG and adjust RR on vent accordingly  -Metformin toxicity low on differential at this time     5.  HERBIE likely 2/2 to ischemic ATN   -Check repeat labs and monitor renal function trend   - Avoid hypotension and optimize BP with IVF and pressor support if needed.   -Get renal sonogram. Avoid nephrotoxic meds as possible.   -Avoid ACEI, ARB and NSAIDS.   -Monitor strict input and output, branch catheter in place   -Nephro c/s       Critical Care time: 75 min assessing presenting problems of acute illness that poses high probability of life threatening deterioration or end organ damage/dysfunction.  Medical decision making including Initiating plan of care, reviewing data, reviewing radiology, direct patient bedside evaluation and interpretation of vital signs, any necessary ventilator management , discussion with multidisciplinary team, discussing goals of care with patient/family, all non inclusive of procedures      **Acute event discussed with eICU attending and team.  eICU is in agreement with the above plan

## 2018-10-02 NOTE — PROCEDURE NOTE - PROCEDURE
<<-----Click on this checkbox to enter Procedure Arterial line placement  10/02/2018    Active  LISSACOFI

## 2018-10-02 NOTE — AIRWAY PLACEMENT NOTE ADULT - AIRWAY COMMENTS:
Pre Oxygenate the patient Via BVM with 100% O2.  Visualized the cord with Glide Scope, inserted 7.5 ET tube through the vocal cord.  CO2 Dectector color changed to yellow, Bilateral equal Breath Sound noted.  Secure the ET tube at 22 on the right lip.  Patient tolerated wel

## 2018-10-02 NOTE — PROGRESS NOTE ADULT - SUBJECTIVE AND OBJECTIVE BOX
Events noted; intubated;      Growth in aerobic bottle: Gram Negative Coccobacilli; Neisseria Meningiditis;    MEDICATIONS  (STANDING):  ALBUTerol    0.083% 2.5 milliGRAM(s) Nebulizer every 6 hours  ascorbic acid IVPB 1500 milliGRAM(s) IV Intermittent every 6 hours  cefTRIAXone   IVPB      cefTRIAXone   IVPB 2 Gram(s) IV Intermittent every 12 hours  chlorhexidine 0.12% Liquid 15 milliLiter(s) Swish and Spit two times a day  chlorhexidine 4% Liquid 1 Application(s) Topical <User Schedule>  dexmedetomidine Infusion 0.7 MICROgram(s)/kG/Hr (13.405 mL/Hr) IV Continuous <Continuous>  dextrose 5%. 1000 milliLiter(s) (50 mL/Hr) IV Continuous <Continuous>  dextrose 50% Injectable 12.5 Gram(s) IV Push once  dextrose 50% Injectable 25 Gram(s) IV Push once  dextrose 50% Injectable 25 Gram(s) IV Push once  hydrocortisone sodium succinate Injectable 50 milliGRAM(s) IV Push every 6 hours  insulin lispro (HumaLOG) corrective regimen sliding scale   SubCutaneous three times a day before meals  insulin lispro (HumaLOG) corrective regimen sliding scale   SubCutaneous at bedtime  midodrine 10 milliGRAM(s) Oral every 8 hours  norepinephrine Infusion 1 MICROgram(s)/kG/Min (71.813 mL/Hr) IV Continuous <Continuous>  pantoprazole   Suspension 40 milliGRAM(s) Oral daily  phytonadione   Solution 10 milliGRAM(s) Oral once  propofol Infusion 5 MICROgram(s)/kG/Min (2.298 mL/Hr) IV Continuous <Continuous>  thiamine IVPB 200 milliGRAM(s) IV Intermittent <User Schedule>  vasopressin Infusion 0.04 Unit(s)/Min (2.4 mL/Hr) IV Continuous <Continuous>    MEDICATIONS  (PRN):  acetaminophen   Tablet .. 650 milliGRAM(s) Oral every 6 hours PRN Temp greater or equal to 38C (100.4F), Mild Pain (1 - 3)  dextrose 40% Gel 15 Gram(s) Oral once PRN Blood Glucose LESS THAN 70 milliGRAM(s)/deciliter  glucagon  Injectable 1 milliGRAM(s) IntraMuscular once PRN Glucose LESS THAN 70 milligrams/deciliter      10-01-18 @ 07:01  -  10-02-18 @ 07:00  --------------------------------------------------------  IN: 8648 mL / OUT: 1300 mL / NET: 7348 mL    10-02-18 @ 07:01  -  10-02-18 @ 16:25  --------------------------------------------------------  IN: 1834.2 mL / OUT: 550 mL / NET: 1284.2 mL      PHYSICAL EXAM:      T(C): 37.8 (10-02-18 @ 15:35), Max: 38.2 (10-02-18 @ 12:30)  HR: 98 (10-02-18 @ 16:00) (79 - 166)  BP: 145/78 (10-02-18 @ 09:05) (74/48 - 150/125)  RR: 31 (10-02-18 @ 16:00) (17 - 36)  SpO2: 91% (10-02-18 @ 16:00) (50% - 100%)  Wt(kg): --  Respiratory: rhonchi  anteriorly, decreased BS at bases  Cardiovascular: S1 S2  Gastrointestinal: soft NT ND +BS  Extremities:  1  edema                                    10.8   22.60 )-----------( 71       ( 02 Oct 2018 14:57 )             33.3     10-02    145  |  113<H>  |  22  ----------------------------<  252<H>  4.3   |  15<L>  |  1.68<H>    Ca    7.0<L>corrected 8.6      02 Oct 2018 05:47  Phos  2.6     10-02  Mg     1.6     10-02    TPro  3.7<L>  /  Alb  1.7<L>  /  TBili  1.2  /  DBili  x   /  AST  88<H>  /  ALT  69  /  AlkPhos  58  10-02    ABG - ( 02 Oct 2018 14:39 )  pH, Arterial: x     pH, Blood: 7.24  /  pCO2: 42    /  pO2: 90    / HCO3: 17    / Base Excess: -9.4  /  SaO2: 95                LIVER FUNCTIONS - ( 02 Oct 2018 05:47 )  Alb: 1.7 g/dL / Pro: 3.7 gm/dL / ALK PHOS: 58 U/L / ALT: 69 U/L / AST: 88 U/L / GGT: x             Assessment and Plan:    HERBIE septic shock, ATN;  lactic acidosis Type A with possible residual metformin effect;; meningococcemia; infectious GN; DIC;  Supportive care, IV abx; Bicarbonate for pH < 7.1-7.15; Prn IV push in lieu of IVF due to volume overload;   No immediate indication for HD, will follow clinical course closely.

## 2018-10-03 NOTE — CONSULT NOTE ADULT - ASSESSMENT
Assessment: 72F with PMH Breast Ca, DM2, HTN and unknown PSH admitted with septic shock with MOSD, lactic acidosis, found to have meningococcal bacteremia. Surgery consulted for complaint of RUQ abdominal pain, and Abd. US finding of gallbladder wall thickening.     Plan:  As per Dr. Salmon, consider repeat Abd. US in 2 days to reassess gallbladder, and IR consult for possible perc patricia tube  Continue primary care per ICU  F/u labs Assessment:     72F with PMH Breast Ca, DM2, HTN and unknown PSH , with septic shock with MOSD, lactic acidosis, found to have meningococcal bacteremia. Surgery consulted for elevated Transaminase, and Abd. US finding of gallbladder wall thickening.  Most likely Liver injury secondary to septic shock.  Will consider Acute Acalculous Cholecystitis as a Differential Diagnosis.  Acute Renal failure.  Meningococcal bacteremia, sepsis.   Elevated Troponin.  Critical patient.    Plan:  Continue the excellent ICU management,   IV Antibiotics.  Consider repeat Abd. US in 2 days to reassess gallbladder distention ,   IR consult for possible percutaneous  cholecystostomy  tube if needed.  Renal  and Cardiology Consult and F/U.  Will f/u patient.  Plan discussed with ICU PA.

## 2018-10-03 NOTE — PROGRESS NOTE ADULT - SUBJECTIVE AND OBJECTIVE BOX
HPI:  Called to see 73 Y/O female w/ PMHx of HTN, DM, breast CA (s/p lumpectomy, radiation/chemo) BIBEMS w/ c/o N/V, malaise, loose stool since night prior to presentation. Upon evaluation in ED, pt was noted to go into [facility] and given 10mg IV cardizem x 1, followed by 30mg PO x 1. Pt was also noted to have an increase in serum lactate from 3.0 to 6.8 after receiving 4L of NS, and becoming hypotensive w/ pressure in the 80's. ICU called for further evaluation. Pt denies recent travel, recent known sick contacts, or change in bowel habits. (01 Oct 2018 04:30)      24 hr events: Blood cultures positive for nisseria meningitidis. Unable to obtain history.    ## ROS:  [x] unable to obtain    ## Vitals  ICU Vital Signs Last 24 Hrs  T(C): 38.9 (03 Oct 2018 07:30), Max: 38.9 (03 Oct 2018 07:30)  T(F): 102 (03 Oct 2018 07:30), Max: 102 (03 Oct 2018 07:30)  HR: 125 (03 Oct 2018 09:41) (86 - 166)  BP: 129/40 (02 Oct 2018 22:00) (129/40 - 129/40)  BP(mean): 62 (02 Oct 2018 22:00) (62 - 62)  ABP: 95/56 (03 Oct 2018 09:00) (67/-1 - 137/62)  ABP(mean): 70 (03 Oct 2018 09:00) (26 - 100)  RR: 35 (03 Oct 2018 09:00) (24 - 35)  SpO2: 98% (03 Oct 2018 09:41) (50% - 100%)      ## Physical Exam:  Gen: Elderly female lying in bed, intubated, NAD  HEENT: NC/AT sclerae anicteric  Resp: Intubated, mechanical breath sounds b/l  CV: S1, S2, tachycardic  Abd: Soft, +BS  Ext: + Edema  Neuro: Sedated, unarousable    ## Vent Data  Mode: AC/ CMV (Assist Control/ Continuous Mandatory Ventilation)  RR (machine): 35  TV (machine): 290  FiO2: 35  PEEP: 5  ITime: 0.7  MAP: 12  PIP: 27      ## Labs:  Chem:  10-03    143  |  109<H>  |  30<H>  ----------------------------<  223<H>  5.0   |  16<L>  |  2.36<H>    Ca    6.7<L>      03 Oct 2018 09:58  Phos  4.0     10  Mg     2.0     10    TPro  4.9<L>  /  Alb  2.0<L>  /  TBili  2.2<H>  /  DBili  x   /  AST  2910<H>  /  ALT  1109<H>  /  AlkPhos  134<H>  10    LIVER FUNCTIONS - ( 03 Oct 2018 09:58 )  Alb: 2.0 g/dL / Pro: 4.9 gm/dL / ALK PHOS: 134 U/L / ALT: 1109 U/L / AST: 2910 U/L / GGT: x           CBC:                        9.6    19.07 )-----------( 63       ( 03 Oct 2018 04:10 )             30.5     Coags:  PT/INR - ( 03 Oct 2018 04:10 )   PT: 23.0 sec;   INR: 2.08 ratio         PTT - ( 03 Oct 2018 04:10 )  PTT:40.3 sec    Urinalysis Basic - ( 01 Oct 2018 12:54 )    Color: Yellow / Appearance: Clear / S.005 / pH: x  Gluc: x / Ketone: Negative  / Bili: Negative / Urobili: Negative mg/dL   Blood: x / Protein: 30 mg/dL / Nitrite: Negative   Leuk Esterase: Negative / RBC: 0-2 /HPF / WBC x   Sq Epi: x / Non Sq Epi: Occasional / Bacteria: Occasional        ## Cardiac  CARDIAC MARKERS ( 03 Oct 2018 04:10 )  6.220 ng/mL / x     / x     / x     / x      CARDIAC MARKERS ( 02 Oct 2018 22:02 )  6.120 ng/mL / x     / 953 U/L / x     / 26.8 ng/mL  CARDIAC MARKERS ( 02 Oct 2018 14:57 )  7.430 ng/mL / x     / 479 U/L / x     / x      CARDIAC MARKERS ( 02 Oct 2018 05:47 )  .616 ng/mL / x     / 245 U/L / x     / 4.8 ng/mL        ## Blood Gas  ABG - ( 03 Oct 2018 05:05 )  pH, Arterial: x     pH, Blood: 7.27  /  pCO2: 35    /  pO2: 89    / HCO3: 16    / Base Excess: -10.1 /  SaO2: 96                  #I/Os  I&O's Detail    02 Oct 2018 07:01  -  03 Oct 2018 07:00  --------------------------------------------------------  IN:    dexmedetomidine Infusion: 1.9 mL    DOBUTamine Infusion: 34.5 mL    Enteral Tube Flush: 50 mL    fentaNYL Infusion.: 207 mL    norepinephrine Infusion: 1131.6 mL    norepinephrine Infusion: 129 mL    ns in tub fed vital15: 330 mL    Plasma: 338 mL    propofol Infusion: 174.9 mL    sodium bicarbonate  Infusion: 700 mL    Solution: 200 mL    Solution: 50 mL    Solution: 100 mL    Solution: 50 mL    Solution: 100 mL    vasopressin Infusion: 50.4 mL  Total IN: 3647.3 mL    OUT:    Indwelling Catheter - Urethral: 1200 mL  Total OUT: 1200 mL    Total NET: 2447.3 mL          ## Imaging:    ## Medications:  MEDICATIONS  (STANDING):  ALBUTerol    0.083% 2.5 milliGRAM(s) Nebulizer every 6 hours  amiodarone Infusion 1 mG/Min (33.333 mL/Hr) IV Continuous <Continuous>  ascorbic acid IVPB 1500 milliGRAM(s) IV Intermittent every 6 hours  cefTRIAXone   IVPB      cefTRIAXone   IVPB 2 Gram(s) IV Intermittent every 12 hours  chlorhexidine 0.12% Liquid 15 milliLiter(s) Swish and Spit two times a day  chlorhexidine 4% Liquid 1 Application(s) Topical <User Schedule>  dextrose 5%. 1000 milliLiter(s) (50 mL/Hr) IV Continuous <Continuous>  dextrose 50% Injectable 12.5 Gram(s) IV Push once  dextrose 50% Injectable 25 Gram(s) IV Push once  dextrose 50% Injectable 25 Gram(s) IV Push once  fentaNYL   Infusion. 0.5 MICROgram(s)/kG/Hr (0.766 mL/Hr) IV Continuous <Continuous>  hydrocortisone sodium succinate Injectable 50 milliGRAM(s) IV Push every 6 hours  insulin lispro (HumaLOG) corrective regimen sliding scale   SubCutaneous every 6 hours  midodrine 10 milliGRAM(s) Oral every 8 hours  norepinephrine Infusion 1 MICROgram(s)/kG/Min (71.813 mL/Hr) IV Continuous <Continuous>  pantoprazole   Suspension 40 milliGRAM(s) Oral daily  propofol Infusion 5 MICROgram(s)/kG/Min (2.298 mL/Hr) IV Continuous <Continuous>  thiamine IVPB 200 milliGRAM(s) IV Intermittent <User Schedule>  vancomycin  IVPB      vasopressin Infusion 0.04 Unit(s)/Min (2.4 mL/Hr) IV Continuous <Continuous>    MEDICATIONS  (PRN):  acetaminophen   Tablet .. 650 milliGRAM(s) Oral every 6 hours PRN Temp greater or equal to 38C (100.4F), Mild Pain (1 - 3)  dextrose 40% Gel 15 Gram(s) Oral once PRN Blood Glucose LESS THAN 70 milliGRAM(s)/deciliter  glucagon  Injectable 1 milliGRAM(s) IntraMuscular once PRN Glucose LESS THAN 70 milligrams/deciliter

## 2018-10-03 NOTE — PROGRESS NOTE ADULT - ASSESSMENT
71 y/o F w/hx of breast cancer, DM on metformin presenting with severe sepsis with septic shock, HERBIE, lactic acidosis, coagulopathy likely DIC secondary to infection vs HUS, hypocalcemia, hypokalemia, and hypomagnesemia. Lactic acidosis and DIC likely secondary to nisseria meningitidis infection. Now with acute hepatitis also likely secondary infection vs ischemic.     Neuro: Sedation as needed goal RASS -1 to 0  - Fentanyl PRN, propofol    Resp:   - 6 cc/Kg IBW  - Daily SAT/SBT  - Barrier to extubation is septic shock and metabolic acidosis  - Outpatient follow up of pulmonary nodule    CV: Septic shock due to gram negative bacteremia, likely component of cardiogenic shock as well as there is severely reduced LV function on my bedside echo. Failed dobutamine due to arrythmia. Pressors now downtrending. In Afib w/RVR this AM. Elevated troponin, likely NSTEMI. Appreciate cardiology evaluation.  - Levophed, vaso titrate to goal MAP >= 65  - Amio gtt  - Hydrocortisone  - No heparin due to coagulopathy    Renal: HERBIE now worsening again. Likely ATN vs sepsis induced  - Monitor UOP  - Trend Cr, avoid nephrotoxins  - Lactic acidosis improving    ID: Septic shock, nisseria meningitidis. Unlikely meningitis as patient had no meningitic symptoms and was completely lucid prior to intubation, however will cover empirically for possible meningitis. Risk of LP outweighs benefit at this point as patient is coagulopathic. Would just provide empiric coverage for possible meningitis.   - Appreciate ID consult  - Ceftriaxone, Vanc by level  - Vitamin C protocol    GI: Acute hepatitis  - Continue to monitor LFTs    Heme: Small amounts of schistocytes seen on smear, unlikely MAHA  - Trend hgb, coags, no current evidence of bleeding. Coagulopathy likely secondary to DIC from infection    FEN  - Tube feeds  - Replete lytes as needed    Attending critical care time 55 minutes . 71 y/o F w/hx of breast cancer, DM on metformin presenting with severe sepsis with septic shock, HERBIE, lactic acidosis, coagulopathy likely DIC secondary to infection vs HUS, hypocalcemia, hypokalemia, and hypomagnesemia. Lactic acidosis and DIC likely secondary to nisseria meningitidis infection. Now with acute hepatitis also likely secondary infection vs ischemic.     Neuro: Sedation as needed goal RASS -1 to 0  - Fentanyl PRN, propofol    Resp:   - 6 cc/Kg IBW  - Daily SAT/SBT  - Barrier to extubation is septic shock and metabolic acidosis  - Outpatient follow up of pulmonary nodule    CV: Septic shock due to gram negative bacteremia, likely component of cardiogenic shock as well as there is severely reduced LV function on my bedside echo. Failed dobutamine due to arrythmia. Pressors now downtrending. In Afib w/RVR this AM. Elevated troponin, likely NSTEMI. Appreciate cardiology evaluation.  - Levophed, vaso titrate to goal MAP >= 65  - Amio gtt  - Hydrocortisone  - No heparin due to coagulopathy    Renal: HERBIE now worsening again. Likely ATN vs sepsis induced  - Monitor UOP  - Trend Cr, avoid nephrotoxins  - Lactic acidosis improving    ID: Septic shock, nisseria meningitidis. Unlikely meningitis as patient had no meningitic symptoms and was completely lucid prior to intubation, however will cover empirically for possible meningitis. Risk of LP outweighs benefit at this point as patient is coagulopathic. Would just provide empiric coverage for possible meningitis.   - Appreciate ID consult  - Ceftriaxone, Vanc by level  - Vitamin C protocol    GI: Acute hepatitis  - Continue to monitor LFTs    Heme: Small amounts of schistocytes seen on smear, unlikely MAHA  - Trend hgb, coags, no current evidence of bleeding. Coagulopathy likely secondary to DIC from infection    FEN  - Tube feeds  - Replete lytes as needed    Guarded prognosis    Attending critical care time 55 minutes .

## 2018-10-03 NOTE — CONSULT NOTE ADULT - SUBJECTIVE AND OBJECTIVE BOX
GENERAL SURGERY CONSULT NOTE    Patient is a 72y old  Female who presents with a chief complaint of fever, malaise (02 Oct 2018 21:41)    HPI:  Called to see 73 Y/O female w/ PMHx of HTN, DM, breast CA (s/p lumpectomy, radiation/chemo) BIBEMS w/ c/o N/V, malaise, loose stool since night prior to presentation. Upon evaluation in ED, pt was noted to go into [facility] and given 10mg IV cardizem x 1, followed by 30mg PO x 1. Pt was also noted to have an increase in serum lactate from 3.0 to 6.8 after receiving 4L of NS, and becoming hypotensive w/ pressure in the 80's. ICU called for further evaluation. Pt denies recent travel, recent known sick contacts, or change in bowel habits. (01 Oct 2018 04:30)    Pt seen and evaluated with Dr. Salmon. Patient sedated and on pressors in ICU. Patient not responsive to verbal or tactile stimuli.     PAST MEDICAL & SURGICAL HISTORY:  HTN (hypertension)  DM (diabetes mellitus)  Breast cancer  H/O lumpectomy    Review of Systems:  Unable to obtain.     MEDICATIONS  (STANDING):  ALBUTerol    0.083% 2.5 milliGRAM(s) Nebulizer every 6 hours  ascorbic acid IVPB 1500 milliGRAM(s) IV Intermittent every 6 hours  cefTRIAXone   IVPB      cefTRIAXone   IVPB 2 Gram(s) IV Intermittent every 12 hours  chlorhexidine 0.12% Liquid 15 milliLiter(s) Swish and Spit two times a day  chlorhexidine 4% Liquid 1 Application(s) Topical <User Schedule>  dexmedetomidine Infusion 0.7 MICROgram(s)/kG/Hr (13.405 mL/Hr) IV Continuous <Continuous>  dextrose 5%. 1000 milliLiter(s) (50 mL/Hr) IV Continuous <Continuous>  dextrose 50% Injectable 12.5 Gram(s) IV Push once  dextrose 50% Injectable 25 Gram(s) IV Push once  dextrose 50% Injectable 25 Gram(s) IV Push once  fentaNYL   Infusion. 0.5 MICROgram(s)/kG/Hr (0.766 mL/Hr) IV Continuous <Continuous>  hydrocortisone sodium succinate Injectable 50 milliGRAM(s) IV Push every 6 hours  insulin lispro (HumaLOG) corrective regimen sliding scale   SubCutaneous every 6 hours  midodrine 10 milliGRAM(s) Oral every 8 hours  norepinephrine Infusion 1 MICROgram(s)/kG/Min (71.813 mL/Hr) IV Continuous <Continuous>  pantoprazole   Suspension 40 milliGRAM(s) Oral daily  propofol Infusion 5 MICROgram(s)/kG/Min (2.298 mL/Hr) IV Continuous <Continuous>  thiamine IVPB 200 milliGRAM(s) IV Intermittent <User Schedule>  vasopressin Infusion 0.04 Unit(s)/Min (2.4 mL/Hr) IV Continuous <Continuous>    MEDICATIONS  (PRN):  acetaminophen   Tablet .. 650 milliGRAM(s) Oral every 6 hours PRN Temp greater or equal to 38C (100.4F), Mild Pain (1 - 3)  dextrose 40% Gel 15 Gram(s) Oral once PRN Blood Glucose LESS THAN 70 milliGRAM(s)/deciliter  glucagon  Injectable 1 milliGRAM(s) IntraMuscular once PRN Glucose LESS THAN 70 milligrams/deciliter      Allergies    No Known Allergies    Intolerances    SOCIAL HISTORY   Unable to obtain    FAMILY HISTORY:  Unable to obtain    Vital Signs Last 24 Hrs  T(C): 37.3 (03 Oct 2018 01:10), Max: 38.3 (02 Oct 2018 19:50)  T(F): 99.2 (03 Oct 2018 01:10), Max: 100.9 (02 Oct 2018 19:50)  HR: 90 (03 Oct 2018 01:40) (79 - 166)  BP: 129/40 (02 Oct 2018 22:00) (87/53 - 150/125)  BP(mean): 62 (02 Oct 2018 22:00) (61 - 131)  RR: 32 (03 Oct 2018 01:10) (17 - 35)  SpO2: 98% (03 Oct 2018 01:40) (50% - 100%)    Physical Exam:    General:  Intubated and sedated, nonresponsive   Eyes : Pinpoint pupils, fixed  HENT:  WNL, no JVD  Chest: Mechanical breath sounds b/l  Cardiovascular:  S1S2  Abdomen: Old healed midline surgical scar present. Obese, Nondistended, Soft, nontender (patient sedated), no guarding  Extremities: No pitting edema to LEs b/l. 2+ peripheral pulses b/l.   Skin:  No rash  Musculoskeletal:  normal strength  Neuro/Psych:  Intubated and sedated. Nonresponsive.       LABS:                        10.7   22.46 )-----------( 77       ( 02 Oct 2018 22:02 )             33.0     10-    144  |  111<H>  |  23  ----------------------------<  229<H>  4.4   |  16<L>  |  2.02<H>    Ca    6.5<LL>      02 Oct 2018 22:02  Phos  3.7     10  Mg     1.7     10    TPro  5.5<L>  /  Alb  2.1<L>  /  TBili  1.7<H>  /  DBili  1.36<H>  /  AST  809<H>  /  ALT  475<H>  /  AlkPhos  132<H>  10    PT/INR - ( 02 Oct 2018 22:02 )   PT: 25.8 sec;   INR: 2.33 ratio         PTT - ( 02 Oct 2018 02:38 )  PTT:44.1 sec  Urinalysis Basic - ( 01 Oct 2018 12:54 )    Color: Yellow / Appearance: Clear / S.005 / pH: x  Gluc: x / Ketone: Negative  / Bili: Negative / Urobili: Negative mg/dL   Blood: x / Protein: 30 mg/dL / Nitrite: Negative   Leuk Esterase: Negative / RBC: 0-2 /HPF / WBC x   Sq Epi: x / Non Sq Epi: Occasional / Bacteria: Occasional    RADIOLOGY & ADDITIONAL STUDIES:  < from: US Abdomen Complete (10.02.18 @ 21:47) >  EXAM:  US ABDOMINAL COMPLETE                            PROCEDURE DATE:  10/02/2018          INTERPRETATION:  CLINICAL INFORMATION: Bacteremia.  Right upper quadrant   abdominal pain    COMPARISON: None available.    TECHNIQUE: Sonography of the abdomen.     FINDINGS: The examination is degraded by extensive bowel gas.    Liver: 17 cm.  Within normal limits.    Bile ducts: Normal caliber. Common bile duct measures 3 mm.    Gallbladder: Diffuse gallbladder wall thickening measuring up to   approximately 7-9 mm.  No sonographic evidence of gallstones or sludge.    Sonographic Dye sign was negative    Pancreas: Obscured by bowel gas and not diagnostically assessed.    Spleen: Poorly visualized due to bowel gas.  Approximately 8.9 x 4.8 x   4.2 cm.    Right kidney: 11.4 cm No hydronephrosis.        Left kidney: Partially obscured by bowel gas.  9.8 cm.  No   hydronephrosis.         Ascites: None visualized.    Aorta and IVC: Visualized portions are within normal limits.    IMPRESSION: Noevidence of gallstones.  Gallbladder wall thickening to   approximately 7-9 mm.  Positive sonographic Dye sign.  The findings   may represent a calculus cholecystitis in the proper clinical scenario.    Further evaluation with cross-sectional imaging and/or a nuclear medicine   HIDA scan can be performed as clinically warranted.    < end of copied text >    < from: CT Abdomen and Pelvis w/ IV Cont (18 @ 22:13) >  EXAM:  CT ABDOMEN AND PELVIS IC                          EXAM:  CT ANGIO CHEST (W)AW IC                            PROCEDURE DATE:  2018          INTERPRETATION:  CLINICAL INFORMATION: Fever, vomiting and shortness of   breath. History of breast cancer.    TECHNIQUE: CT angiogram chest and contrast enhanced CT of the abdomen and   pelvis were performed. MIP, coronal and sagittal reformats were   generated. 98 cc Omnipaque-350 were administered intravenously and 2 cc   were discarded. No oral contrast administered.    COMPARISON: There is no prior study available for comparison.    FINDINGS:   CHEST:  Lungs and airways: The trachea and main bronchi are patent.  8 mm   spiculated nodule in the periphery of the anterior basal segment of the   right lower lobe (4-60).  There is no pleural effusion or pneumothorax.     Heart and vessels: The heart size is normal. Coronary artery   calcifications. There is no pericardial effusion. The thoracic aorta and   main pulmonary artery are normal caliber.     Mediastinum and soft tissues: The thyroid gland is unremarkable. There is   no axillary, mediastinal or hilar adenopathy. Partial right mastectomy.   Coarse dystrophic calcifications and soft tissue density in the right   breast soft tissues.    Bones: The bony structures are intact.    ABDOMEN AND PELVIS:  Hepatobiliary: Within normal limits.  Pancreas: Within normal limits.  Spleen: Within normal limits.  Adrenals: Within normal limits.    Kidneys/Ureters/Bladder: Within normal limits.  Reproductive Organs: Hysterectomy.    Bowel/Peritoneum: Portions of the gastrointestinal tract are collapsed,   limiting evaluation. No bowel obstruction, inflammation or   pneumoperitoneum.  Normal appendix. Colonic diverticulosis without   diverticulitis.    Vessels:  Mild calcific atherosclerosis. No abdominal aortic aneurysm.  Lymphatics/Retroperitoneum: No lymphadenopathy. No retroperitoneal   hematoma.      Soft Tissues: Small fat-containing umbilical hernia.  Bones: Spinal degenerative changes. Lucent lesions with central stippled   appearance in the T11 and L4 vertebral bodies, possibly vertebral   hemangiomas.    IMPRESSION:  CT Chest:   8 mm spiculated right lower lobe nodule, concerning for primary or   metastatic neoplasm.     Partial right mastectomy with coarse dystrophic calcifications and soft   tissue density in the right breast; correlate with prior mammographic   imaging.    CT Abdomen and Pelvis:   No bowel obstruction or acute findings.     Lucent lesions in theT11 and L4 vertebral bodies with features favoring   hemangiomas, although further imaging recommended to exclude presence of   metabolic activity (if not already performed).    PET CT may be helpful to further characterize both the right lower lobe   pulmonary nodule and vertebral body lesions.    Call Back:  I discussed this case with Dr. Thompson at 11:15 PM on   2018.  Hospital policies for call back including read back policy   were followed. The verbal communication call back supplements this   written report.    < end of copied text > GENERAL SURGERY CONSULT NOTE    Patient is a 72y old  Female who presents with a chief complaint of fever, malaise (02 Oct 2018 21:41)    HPI:  Called to see 73 Y/O female patient in CCU  w/ PMHx of HTN, DM, breast CA (s/p lumpectomy, radiation/chemo) BIBEMS w/ c/o N/V, malaise, loose stool since night prior to presentation. Upon evaluation in ED, pt was noted to go into [facility] and given 10mg IV cardizem x 1, followed by 30mg PO x 1. Pt was also noted to have an increase in serum lactate from 3.0 to 6.8 after receiving 4L of NS, and becoming hypotensive w/ pressure in the 80's. ICU called for further evaluation, admitted to CCU. By reviewing the notes  Pt denies recent travel, recent known sick contacts, or change in bowel habits. (01 Oct 2018 04:30)  In CCU patient suddenly developed respiratory distress, had to be intubated and provided ventilatory support, irritable, was sedated and actually no responsive.  Surgery called to evaluate elevated LFT's and a bed site US showing Thick GB wall.  Pt seen and evaluated with Dr. Salmon. Patient sedated and on vaso pressors in CCU. Patient not responsive to verbal or tactile stimuli.     PAST MEDICAL & SURGICAL HISTORY:  HTN (hypertension)  DM (diabetes mellitus)  Breast cancer  H/O lumpectomy    Review of Systems:  Unable to obtain.     MEDICATIONS  (STANDING):  ALBUTerol    0.083% 2.5 milliGRAM(s) Nebulizer every 6 hours  ascorbic acid IVPB 1500 milliGRAM(s) IV Intermittent every 6 hours  cefTRIAXone   IVPB      cefTRIAXone   IVPB 2 Gram(s) IV Intermittent every 12 hours  chlorhexidine 0.12% Liquid 15 milliLiter(s) Swish and Spit two times a day  chlorhexidine 4% Liquid 1 Application(s) Topical <User Schedule>  dexmedetomidine Infusion 0.7 MICROgram(s)/kG/Hr (13.405 mL/Hr) IV Continuous <Continuous>  dextrose 5%. 1000 milliLiter(s) (50 mL/Hr) IV Continuous <Continuous>  dextrose 50% Injectable 12.5 Gram(s) IV Push once  dextrose 50% Injectable 25 Gram(s) IV Push once  dextrose 50% Injectable 25 Gram(s) IV Push once  fentaNYL   Infusion. 0.5 MICROgram(s)/kG/Hr (0.766 mL/Hr) IV Continuous <Continuous>  hydrocortisone sodium succinate Injectable 50 milliGRAM(s) IV Push every 6 hours  insulin lispro (HumaLOG) corrective regimen sliding scale   SubCutaneous every 6 hours  midodrine 10 milliGRAM(s) Oral every 8 hours  norepinephrine Infusion 1 MICROgram(s)/kG/Min (71.813 mL/Hr) IV Continuous <Continuous>  pantoprazole   Suspension 40 milliGRAM(s) Oral daily  propofol Infusion 5 MICROgram(s)/kG/Min (2.298 mL/Hr) IV Continuous <Continuous>  thiamine IVPB 200 milliGRAM(s) IV Intermittent <User Schedule>  vasopressin Infusion 0.04 Unit(s)/Min (2.4 mL/Hr) IV Continuous <Continuous>    MEDICATIONS  (PRN):  acetaminophen   Tablet .. 650 milliGRAM(s) Oral every 6 hours PRN Temp greater or equal to 38C (100.4F), Mild Pain (1 - 3)  dextrose 40% Gel 15 Gram(s) Oral once PRN Blood Glucose LESS THAN 70 milliGRAM(s)/deciliter  glucagon  Injectable 1 milliGRAM(s) IntraMuscular once PRN Glucose LESS THAN 70 milligrams/deciliter      Allergies    No Known Allergies    Intolerances    SOCIAL HISTORY   Unable to obtain    FAMILY HISTORY:  Unable to obtain    Vital Signs Last 24 Hrs  T(C): 37.3 (03 Oct 2018 01:10), Max: 38.3 (02 Oct 2018 19:50)  T(F): 99.2 (03 Oct 2018 01:10), Max: 100.9 (02 Oct 2018 19:50)  HR: 90 (03 Oct 2018 01:40) (79 - 166)  BP: 129/40 (02 Oct 2018 22:00) (87/53 - 150/125)  BP(mean): 62 (02 Oct 2018 22:00) (61 - 131)  RR: 32 (03 Oct 2018 01:10) (17 - 35)  SpO2: 98% (03 Oct 2018 01:40) (50% - 100%)    Physical Exam:    General:  Intubated and sedated, nonresponsive   Eyes : Pinpoint pupils, fixed  HENT:  WNL, no JVD  Chest: Mechanical breath sounds bilateral, decreased base breath sounds.  Cardiovascular:  S1,S2 sinus rythm.  Abdomen: Old healed midline surgical scar present. Obese, Nondistended, Soft, nontender (patient sedated), no guarding, no rigidity.                  No organomegaly.  Extremities: No pitting edema to LEs b/l. 2+ peripheral pulses b/l.   Skin:  No rash  Musculoskeletal:  normal .  Neuro/Psych:  Intubated and sedated. Nonresponsive.       LABS:                        10.7   22.46 )-----------( 77       ( 02 Oct 2018 22:02 )             33.0     10-    144  |  111<H>  |  23  ----------------------------<  229<H>  4.4   |  16<L>  |  2.02<H>    Ca    6.5<LL>      02 Oct 2018 22:02  Phos  3.7     10  Mg     1.7     10    TPro  5.5<L>  /  Alb  2.1<L>  /  TBili  1.7<H>  /  DBili  1.36<H>  /  AST  809<H>  /  ALT  475<H>  /  AlkPhos  132<H>  10    PT/INR - ( 02 Oct 2018 22:02 )   PT: 25.8 sec;   INR: 2.33 ratio         PTT - ( 02 Oct 2018 02:38 )  PTT:44.1 sec  Urinalysis Basic - ( 01 Oct 2018 12:54 )    Color: Yellow / Appearance: Clear / S.005 / pH: x  Gluc: x / Ketone: Negative  / Bili: Negative / Urobili: Negative mg/dL   Blood: x / Protein: 30 mg/dL / Nitrite: Negative   Leuk Esterase: Negative / RBC: 0-2 /HPF / WBC x   Sq Epi: x / Non Sq Epi: Occasional / Bacteria: Occasional    RADIOLOGY & ADDITIONAL STUDIES:  < from: US Abdomen Complete (10.02.18 @ 21:47) >  EXAM:  US ABDOMINAL COMPLETE                            PROCEDURE DATE:  10/02/2018          INTERPRETATION:  CLINICAL INFORMATION: Bacteremia.  Right upper quadrant   abdominal pain    COMPARISON: None available.    TECHNIQUE: Sonography of the abdomen.     FINDINGS: The examination is degraded by extensive bowel gas.    Liver: 17 cm.  Within normal limits.    Bile ducts: Normal caliber. Common bile duct measures 3 mm.    Gallbladder: Diffuse gallbladder wall thickening measuring up to   approximately 7-9 mm.  No sonographic evidence of gallstones or sludge.    Sonographic Dye sign was negative    Pancreas: Obscured by bowel gas and not diagnostically assessed.    Spleen: Poorly visualized due to bowel gas.  Approximately 8.9 x 4.8 x   4.2 cm.    Right kidney: 11.4 cm No hydronephrosis.        Left kidney: Partially obscured by bowel gas.  9.8 cm.  No   hydronephrosis.         Ascites: None visualized.    Aorta and IVC: Visualized portions are within normal limits.    IMPRESSION: No evidence of gallstones.  Gallbladder wall thickening to   approximately 7-9 mm.  Positive sonographic Dye sign.  The findings   may represent a calculus cholecystitis in the proper clinical scenario.    Further evaluation with cross-sectional imaging and/or a nuclear medicine   HIDA scan can be performed as clinically warranted.    < end of copied text >    < from: CT Abdomen and Pelvis w/ IV Cont (18 @ 22:13) >  EXAM:  CT ABDOMEN AND PELVIS IC                          EXAM:  CT ANGIO CHEST (W)AW IC                            PROCEDURE DATE:  2018          INTERPRETATION:  CLINICAL INFORMATION: Fever, vomiting and shortness of   breath. History of breast cancer.    TECHNIQUE: CT angiogram chest and contrast enhanced CT of the abdomen and   pelvis were performed. MIP, coronal and sagittal reformats were   generated. 98 cc Omnipaque-350 were administered intravenously and 2 cc   were discarded. No oral contrast administered.    COMPARISON: There is no prior study available for comparison.    FINDINGS:   CHEST:  Lungs and airways: The trachea and main bronchi are patent.  8 mm   spiculated nodule in the periphery of the anterior basal segment of the   right lower lobe (4-60).  There is no pleural effusion or pneumothorax.     Heart and vessels: The heart size is normal. Coronary artery   calcifications. There is no pericardial effusion. The thoracic aorta and   main pulmonary artery are normal caliber.     Mediastinum and soft tissues: The thyroid gland is unremarkable. There is   no axillary, mediastinal or hilar adenopathy. Partial right mastectomy.   Coarse dystrophic calcifications and soft tissue density in the right   breast soft tissues.    Bones: The bony structures are intact.    ABDOMEN AND PELVIS:  Hepatobiliary: Within normal limits.  Pancreas: Within normal limits.  Spleen: Within normal limits.  Adrenals: Within normal limits.    Kidneys/Ureters/Bladder: Within normal limits.  Reproductive Organs: Hysterectomy.    Bowel/Peritoneum: Portions of the gastrointestinal tract are collapsed,   limiting evaluation. No bowel obstruction, inflammation or   pneumoperitoneum.  Normal appendix. Colonic diverticulosis without   diverticulitis.    Vessels:  Mild calcific atherosclerosis. No abdominal aortic aneurysm.  Lymphatics/Retroperitoneum: No lymphadenopathy. No retroperitoneal   hematoma.      Soft Tissues: Small fat-containing umbilical hernia.  Bones: Spinal degenerative changes. Lucent lesions with central stippled   appearance in the T11 and L4 vertebral bodies, possibly vertebral   hemangiomas.    IMPRESSION:  CT Chest:   8 mm spiculated right lower lobe nodule, concerning for primary or   metastatic neoplasm.     Partial right mastectomy with coarse dystrophic calcifications and soft   tissue density in the right breast; correlate with prior mammographic   imaging.    CT Abdomen and Pelvis:   No bowel obstruction or acute findings.     Lucent lesions in theT11 and L4 vertebral bodies with features favoring   hemangiomas, although further imaging recommended to exclude presence of   metabolic activity (if not already performed).    PET CT may be helpful to further characterize both the right lower lobe   pulmonary nodule and vertebral body lesions.    Call Back:  I discussed this case with Dr. Thompson at 11:15 PM on   2018.  Hospital policies for call back including read back policy   were followed. The verbal communication call back supplements this   written report.    < end of copied text >

## 2018-10-03 NOTE — PROGRESS NOTE ADULT - SUBJECTIVE AND OBJECTIVE BOX
TMAX - 102    On day # 2 Rocephin now    Vital Signs Last 24 Hrs  T(C): 39.2 (03 Oct 2018 13:00), Max: 39.2 (03 Oct 2018 13:00)  T(F): 102.6 (03 Oct 2018 13:00), Max: 102.6 (03 Oct 2018 13:00)  HR: 82 (03 Oct 2018 13:42) (75 - 130)  BP: 129/40 (02 Oct 2018 22:00) (129/40 - 129/40)  BP(mean): 62 (02 Oct 2018 22:00) (62 - 62)  RR: 35 (03 Oct 2018 11:00) (24 - 38)  SpO2: 97% (03 Oct 2018 13:42) (57% - 100%)  Mode: AC/ CMV (Assist Control/ Continuous Mandatory Ventilation)  RR (machine): 35  TV (machine): 290  FiO2: 35  PEEP: 5  ITime: 1  MAP: 10  PIP: 13  Supplemental O2:  on ventilator    Remains intubated and sedated on the ventilator.  LP was attempted but unsuccessful.  Patient remains on pressors for BP support and Amiodarone Drip for rx of Rapid Afib which appears to be controlled now.      PHYSICAL EXAM  General:  sedated, on ventilator, on 35% FIO2 and 5 PEEP, lying in bed in semi-reclined position now  HEENT:  conj pink, Lt Scleral hemorrhage noted now on the lower aspect of the sclerae of the Lt eye, no other hemorrhages seen, orally intubated and with NGT in place with feedings in                progress  Neck: semi-supple, no nodes noted           Lt. IJ CVP in place - site clean with dressing dry and intact - placed 10/2/18   Heart:  Irreg R  Lungs: some upper airway congestion bilaterally   Abdomen:  BS+, semi-soft, appears nontender to palpation  Extremities:  1-2+ edema LE's and some swelling of the arms and hands noted  Skin:  warm, dry, no rash noted  Serna remains in place with trudi-colored urine in the bag noted      I&O's Summary :    02 Oct 2018 07:01  -  03 Oct 2018 07:00  --------------------------------------------------------  IN: 3647.3 mL / OUT: 1200 mL / NET: 2447.3 mL    03 Oct 2018 07:01  -  03 Oct 2018 14:12  --------------------------------------------------------  IN: 115.8 mL / OUT: 90 mL / NET: 25.8 mL      LABS:  CBC Full  -  ( 03 Oct 2018 04:10 )  WBC Count : 19.07 K/uL  Hemoglobin : 9.6 g/dL  Hematocrit : 30.5 %  Platelet Count - Automated : 63 K/uL  Mean Cell Volume : 87.1 fl  Mean Cell Hemoglobin : 27.4 pg  Mean Cell Hemoglobin Concentration : 31.5 gm/dL  Auto Neutrophil # : x  Auto Lymphocyte # : x  Auto Monocyte # : x  Auto Eosinophil # : x  Auto Basophil # : x  Auto Neutrophil % : x  Auto Lymphocyte % : x  Auto Monocyte % : x  Auto Eosinophil % : x  Auto Basophil % : x    10-03    143  |  109<H>  |  30<H>  ----------------------------<  223<H>  5.0   |  16<L>  |  2.36<H>    Ca    6.7<L>      03 Oct 2018 09:58  Phos  4.0     10-03  Mg     2.0     10-03    TPro  4.9<L>  /  Alb  2.0<L>  /  TBili  2.2<H>  /  DBili  x   /  AST  2910<H>  /  ALT  1109<H>  /  AlkPhos  134<H>  10-03    LIVER FUNCTIONS - ( 03 Oct 2018 09:58 )  Alb: 2.0 g/dL / Pro: 4.9 gm/dL / ALK PHOS: 134 U/L / ALT: 1109 U/L / AST: 2910 U/L / GGT: x             PT/INR - ( 03 Oct 2018 04:10 )   PT: 23.0 sec;   INR: 2.08 ratio      PTT - ( 03 Oct 2018 04:10 )  PTT:40.3 sec    ABG - ( 03 Oct 2018 12:33 )  pH, Arterial: x     pH, Blood: 7.23  /  pCO2: 24    /  pO2: 90    / HCO3: 10    / Base Excess: -16.5 /  SaO2: 95          CARDIAC MARKERS ( 03 Oct 2018 04:10 )  6.220 ng/mL / x     / x     / x     / x        CARDIAC MARKERS ( 02 Oct 2018 22:02 )  6.120 ng/mL / x     / 953 U/L / x     / 26.8 ng/mL    CARDIAC MARKERS ( 02 Oct 2018 14:57 )  7.430 ng/mL / x     / 479 U/L / x     / x        CARDIAC MARKERS ( 02 Oct 2018 05:47 )  .616 ng/mL / x     / 245 U/L / x     / 4.8 ng/mL      Sedimentation Rate, Erythrocyte: 23 mm/hr (10-03 @ 04:10)    CRP -35.47    Lactate - 8.4    Procalcitonin - > 100.00          Influenza A Rapid Screen: Negative (10-03 @ 04:10)  Influenza B Rapid Screen: Negative (10-03 @ 04:10)  Influenza A Rapid Screen: Negative (09-30 @ 21:20)  Influenza B Rapid Screen: Negative (09-30 @ 21:20)          Lipase, Serum: 49 U/L (10-02 @ 22:02)    Amylase, Serum Total: 231 U/L (10-02 @ 22:02)        MICROBIOLOGY:  Specimen Source: .Sputum Sputum (10-03 @ 08:44)    Specimen Source: .Urine Clean Catch (Midstream) (10-01 @ 14:43)  Culture Results:   No growth (10-01 @ 14:43)    Specimen Source: .Blood Blood (09-30 @ 22:52)  Culture Results:   Growth in aerobic bottle: Neisseria meningitidis "Susceptibilities not  performed"  Isolate sent to  [facility]  for confirmation.        Specimen Source: .Blood Blood (09-30 @ 22:52)  Culture Results:   Growth in aerobic bottle: Neisseria meningitidis "Susceptibilities not  performed"  See previous culture [id number] (09-30 @ 22:52)          Radiology:  < from: US Abdomen Complete (10.02.18 @ 21:47) >    EXAM:  US ABDOMINAL COMPLETE                            PROCEDURE DATE:  10/02/2018          INTERPRETATION:  CLINICAL INFORMATION: Bacteremia.  Right upper quadrant   abdominal pain    COMPARISON: None available.    TECHNIQUE: Sonography of the abdomen.     FINDINGS: The examination is degraded by extensive bowel gas.    Liver: 17 cm.  Within normal limits.    Bile ducts: Normal caliber. Common bile duct measures 3 mm.    Gallbladder: Diffuse gallbladder wall thickening measuring up to   approximately 7-9 mm.  No sonographic evidence of gallstones or sludge.    Sonographic Dye sign was negative    Pancreas: Obscured by bowel gas and not diagnostically assessed.    Spleen: Poorly visualized due to bowel gas.  Approximately 8.9 x 4.8 x   4.2 cm.    Right kidney: 11.4 cm No hydronephrosis.        Left kidney: Partially obscured by bowel gas.  9.8 cm.  No   hydronephrosis.         Ascites: None visualized.    Aorta and IVC: Visualized portions are within normal limits.    IMPRESSION: Noevidence of gallstones.  Gallbladder wall thickening to   approximately 7-9 mm.  Positive sonographic Dye sign.  The findings   may represent a calculus cholecystitis in the proper clinical scenario.    Further evaluation with cross-sectional imaging and/or a nuclear medicine   HIDA scan can be performed as clinically warranted.                  CXR - < from: Xray Chest 1 View- PORTABLE-Urgent (10.02.18 @ 05:17) >  Comparisons:  Chest x-ray dated 10/2/2018    Findings:   ET tube is in good position at the level of the aortic   arch. Left jugular IV catheter in the SVC.  . Nasogastric tube in   stomach. Moderate bilateral pulmonic congestion without pleural fluid .    Heart size is unremarkable.     The thorax is normal for age.    Impression: Bilateral pulmonary vascular congestion.            Impression:             Suggestions:

## 2018-10-03 NOTE — PROGRESS NOTE ADULT - SUBJECTIVE AND OBJECTIVE BOX
Assessment:  Asked to assess this patient with septic shock.  Patient remains in intensive care unit with full support.  Noted troponin level increased significantly.  This very likely may be a cardiac event however in the setting of acute sepsis with a preserved ejection fraction on a recent diagnostic echocardiogram, will repeat a limited bedside TTE for wall motion assessment  The initiation of a full dose Aspirin as tolerable is recommended.  Continue intravenous antibiotics

## 2018-10-03 NOTE — PROGRESS NOTE ADULT - SUBJECTIVE AND OBJECTIVE BOX
No new events; intubated sedated    MEDICATIONS  (STANDING):  ALBUTerol    0.083% 2.5 milliGRAM(s) Nebulizer every 6 hours  amiodarone Infusion 0.5 mG/Min (16.667 mL/Hr) IV Continuous <Continuous>  amiodarone Infusion 1 mG/Min (33.333 mL/Hr) IV Continuous <Continuous>  ascorbic acid IVPB 1500 milliGRAM(s) IV Intermittent every 6 hours  cefTRIAXone   IVPB      cefTRIAXone   IVPB 2 Gram(s) IV Intermittent every 12 hours  chlorhexidine 0.12% Liquid 15 milliLiter(s) Swish and Spit two times a day  chlorhexidine 4% Liquid 1 Application(s) Topical <User Schedule>  dextrose 5%. 1000 milliLiter(s) (50 mL/Hr) IV Continuous <Continuous>  dextrose 50% Injectable 12.5 Gram(s) IV Push once  dextrose 50% Injectable 25 Gram(s) IV Push once  dextrose 50% Injectable 25 Gram(s) IV Push once  fentaNYL   Infusion. 0.5 MICROgram(s)/kG/Hr (0.766 mL/Hr) IV Continuous <Continuous>  hydrocortisone sodium succinate Injectable 50 milliGRAM(s) IV Push every 6 hours  insulin lispro (HumaLOG) corrective regimen sliding scale   SubCutaneous every 6 hours  midodrine 10 milliGRAM(s) Oral every 8 hours  norepinephrine Infusion 1 MICROgram(s)/kG/Min (71.813 mL/Hr) IV Continuous <Continuous>  pantoprazole   Suspension 40 milliGRAM(s) Oral daily  propofol Infusion 5 MICROgram(s)/kG/Min (2.298 mL/Hr) IV Continuous <Continuous>  thiamine IVPB 200 milliGRAM(s) IV Intermittent <User Schedule>  vancomycin  IVPB      vasopressin Infusion 0.04 Unit(s)/Min (2.4 mL/Hr) IV Continuous <Continuous>    MEDICATIONS  (PRN):  acetaminophen   Tablet .. 650 milliGRAM(s) Oral every 6 hours PRN Temp greater or equal to 38C (100.4F), Mild Pain (1 - 3)  dextrose 40% Gel 15 Gram(s) Oral once PRN Blood Glucose LESS THAN 70 milliGRAM(s)/deciliter  glucagon  Injectable 1 milliGRAM(s) IntraMuscular once PRN Glucose LESS THAN 70 milligrams/deciliter      10-02-18 @ 07:01  -  10-03-18 @ 07:00  --------------------------------------------------------  IN: 3647.3 mL / OUT: 1200 mL / NET: 2447.3 mL    10-03-18 @ 07:01  -  10-03-18 @ 15:23  --------------------------------------------------------  IN: 115.8 mL / OUT: 90 mL / NET: 25.8 mL      PHYSICAL EXAM:      T(C): 39.2 (10-03-18 @ 13:00), Max: 39.2 (10-03-18 @ 13:00)  HR: 82 (10-03-18 @ 13:42) (75 - 130)  BP: 129/40 (10-02-18 @ 22:00) (129/40 - 129/40)  RR: 35 (10-03-18 @ 11:00) (24 - 38)  SpO2: 97% (10-03-18 @ 13:42) (57% - 100%)  Wt(kg): --  Respiratory: scattered rhonchi anteriorly, decreased BS at bases  Cardiovascular: S1 S2  Gastrointestinal: soft NT ND +BS  Extremities:   2 edema                                    9.6    19.07 )-----------( 63       ( 03 Oct 2018 04:10 )             30.5     10-03    143  |  109<H>  |  30<H>  ----------------------------<  223<H>  5.0   |  16<L>  |  2.36<H>    Ca    6.7<L>      03 Oct 2018 09:58  Phos  4.0     10-03  Mg     2.0     10-03    TPro  4.9<L>  /  Alb  2.0<L>  /  TBili  2.2<H>  /  DBili  x   /  AST  2910<H>  /  ALT  1109<H>  /  AlkPhos  134<H>  10-03    ABG - ( 03 Oct 2018 12:33 )  pH, Arterial: x     pH, Blood: 7.23  /  pCO2: 24    /  pO2: 90    / HCO3: 10    / Base Excess: -16.5 /  SaO2: 95                LIVER FUNCTIONS - ( 03 Oct 2018 09:58 )  Alb: 2.0 g/dL / Pro: 4.9 gm/dL / ALK PHOS: 134 U/L / ALT: 1109 U/L / AST: 2910 U/L / GGT: x             Assessment and Plan:    HERBIE septic shock, ATN;  lactic acidosis Type A with possible residual metformin effect;; meningococcemia; infectious GN; DIC;  Supportive care, IV abx; Bicarbonate for pH < 7.1-7.15; Prn IV push in lieu of IVF due to volume overload;   Will follow closely.

## 2018-10-03 NOTE — CONSULT NOTE ADULT - CONSULT REASON
RUQ abdominal pain, Gallbladder wall thickening Patient on Vent Support , Gallbladder wall thickening, elevated LFT's

## 2018-10-04 NOTE — PROCEDURE NOTE - NSINFORMCONSENT_GEN_A_CORE
Benefits, risks, and possible complications of procedure explained to patient/caregiver who verbalized understanding and gave verbal consent.
Benefits, risks, and possible complications of procedure explained to patient/caregiver who verbalized understanding and gave written consent.
Benefits, risks, and possible complications of procedure explained to patient/caregiver who verbalized understanding and gave verbal consent.
Benefits, risks, and possible complications of procedure explained to patient/caregiver who verbalized understanding and gave written consent.
Izabella Yadav/Benefits, risks, and possible complications of procedure explained to patient/caregiver who verbalized understanding and gave verbal consent.

## 2018-10-04 NOTE — PROCEDURE NOTE - PROCEDURE
<<-----Click on this checkbox to enter Procedure Dialysis catheter inserted in groin  10/04/2018    Active  MDURST

## 2018-10-04 NOTE — PROCEDURE NOTE - ADDITIONAL PROCEDURE DETAILS
Attempted placement of catheter in RIJ, however on multiple attempts was unable to thread the guidewire past ~ 15 cm. Procedure aborted and above femoral line placement was performed.

## 2018-10-04 NOTE — PROCEDURE NOTE - NSINDICATIONS_GEN_A_CORE
suspected CNS infection/critical patient/CSF sampling
critical patient
critical patient/monitoring purposes/arterial puncture to obtain ABG's/blood sampling
dialysis/CRRT
critical illness

## 2018-10-04 NOTE — PROCEDURE NOTE - NSPROCDETAILS_GEN_ALL_CORE
location identified, draped/prepped, sterile technique used, needle inserted/introduced/area cleaned in sterile fashion/procedure aborted
sutured in place/hemostasis with direct pressure, dressing applied/all materials/supplies accounted for at end of procedure/ultrasound guidance/location identified, draped/prepped, sterile technique used, needle inserted/introduced/connected to a pressurized flush line/Seldinger technique/positive blood return obtained via catheter
location identified, draped/prepped, sterile technique used, needle inserted/introduced/all materials/supplies accounted for at end of procedure/connected to a pressurized flush line/positive blood return obtained via catheter/sutured in place
sterile dressing applied/lumen(s) aspirated and flushed/guidewire recovered/sterile technique, catheter placed/ultrasound guidance
guidewire recovered/sterile dressing applied/sterile technique, catheter placed/ultrasound guidance/lumen(s) aspirated and flushed

## 2018-10-04 NOTE — PROCEDURE NOTE - NSPROCNAME_GEN_A_CORE
Arterial Puncture/Cannulation
Arterial Puncture/Cannulation
Central Line Insertion
Lumbar Puncture
Central Line Insertion

## 2018-10-04 NOTE — PROGRESS NOTE ADULT - SUBJECTIVE AND OBJECTIVE BOX
Subjective: Vented, FiO2 45%, sedated with diprivan. Still on low dose Levophed, Vasopressin.       MEDICATIONS  (STANDING):  ALBUTerol    0.083% 2.5 milliGRAM(s) Nebulizer every 6 hours  amiodarone    Tablet 200 milliGRAM(s) Oral daily  amiodarone Infusion 0.5 mG/Min (16.667 mL/Hr) IV Continuous <Continuous>  ascorbic acid IVPB 1500 milliGRAM(s) IV Intermittent every 6 hours  cefTRIAXone   IVPB      cefTRIAXone   IVPB 2 Gram(s) IV Intermittent every 12 hours  chlorhexidine 0.12% Liquid 15 milliLiter(s) Swish and Spit two times a day  chlorhexidine 4% Liquid 1 Application(s) Topical <User Schedule>  dextrose 5%. 1000 milliLiter(s) (50 mL/Hr) IV Continuous <Continuous>  dextrose 50% Injectable 12.5 Gram(s) IV Push once  dextrose 50% Injectable 25 Gram(s) IV Push once  dextrose 50% Injectable 25 Gram(s) IV Push once  fentaNYL   Infusion. 0.5 MICROgram(s)/kG/Hr (0.766 mL/Hr) IV Continuous <Continuous>  hydrocortisone sodium succinate Injectable 50 milliGRAM(s) IV Push every 6 hours  insulin lispro (HumaLOG) corrective regimen sliding scale   SubCutaneous every 6 hours  metroNIDAZOLE  IVPB      metroNIDAZOLE  IVPB 500 milliGRAM(s) IV Intermittent once  metroNIDAZOLE  IVPB 500 milliGRAM(s) IV Intermittent every 8 hours  midodrine 10 milliGRAM(s) Oral every 8 hours  norepinephrine Infusion 1 MICROgram(s)/kG/Min (71.813 mL/Hr) IV Continuous <Continuous>  pantoprazole   Suspension 40 milliGRAM(s) Oral daily  propofol Infusion 5 MICROgram(s)/kG/Min (2.298 mL/Hr) IV Continuous <Continuous>  sodium bicarbonate  Infusion 0.196 mEq/kG/Hr (100 mL/Hr) IV Continuous <Continuous>  thiamine IVPB 200 milliGRAM(s) IV Intermittent <User Schedule>  vancomycin  IVPB      vancomycin  IVPB 750 milliGRAM(s) IV Intermittent every 24 hours  vasopressin Infusion 0.04 Unit(s)/Min (2.4 mL/Hr) IV Continuous <Continuous>    MEDICATIONS  (PRN):  acetaminophen   Tablet .. 650 milliGRAM(s) Oral every 6 hours PRN Temp greater or equal to 38C (100.4F), Mild Pain (1 - 3)  dextrose 40% Gel 15 Gram(s) Oral once PRN Blood Glucose LESS THAN 70 milliGRAM(s)/deciliter  glucagon  Injectable 1 milliGRAM(s) IntraMuscular once PRN Glucose LESS THAN 70 milligrams/deciliter          T(C): 37.2 (10-04-18 @ 08:00), Max: 39.7 (10-03-18 @ 16:00)  HR: 69 (10-04-18 @ 08:36) (54 - 130)  BP: --  RR: 27 (10-04-18 @ 08:00) (13 - 38)  SpO2: 91% (10-04-18 @ 08:36) (66% - 100%)  Wt(kg): --    ABG - ( 04 Oct 2018 06:38 )  pH, Arterial: x     pH, Blood: 7.02  /  pCO2: 34    /  pO2: 94    / HCO3: 8     / Base Excess: -21.0 /  SaO2: 93                  I&O's Detail    03 Oct 2018 07:01  -  04 Oct 2018 07:00  --------------------------------------------------------  IN:    amiodarone Infusion: 266.3 mL    amiodarone Infusion: 149.4 mL    fentaNYL Infusion.: 234 mL    norepinephrine Infusion: 386.9 mL    ns in tub fed vital15: 730 mL    propofol Infusion: 4.6 mL    Solution: 50 mL    Solution: 150 mL    Solution: 100 mL    vasopressin Infusion: 48 mL  Total IN: 2119.2 mL    OUT:    Indwelling Catheter - Urethral: 100 mL  Total OUT: 100 mL    Total NET: 2019.2 mL          Mode: AC/ CMV (Assist Control/ Continuous Mandatory Ventilation)  RR (machine): 35  TV (machine): 290  FiO2: 45  PEEP: 5  ITime: 1  MAP: 16  PIP: 27       PHYSICAL EXAM:    GENERAL: Vented/FiO2 45%  EYES: EOMI, PERRLA, conjunctiva and sclera clear  NECK: Supple, no inc in JVP  CHEST/LUNG: occ crackles  HEART: S1S2  ABDOMEN: Soft, Nontender, Nondistended; Bowel sounds present  EXTREMITIES:  trace edema  NEURO: sedated      LABS:  CBC Full  -  ( 04 Oct 2018 04:10 )  WBC Count : 25.05 K/uL  Hemoglobin : 8.6 g/dL  Hematocrit : 29.2 %  Platelet Count - Automated : 25 K/uL  Mean Cell Volume : 92.7 fl  Mean Cell Hemoglobin : 27.3 pg  Mean Cell Hemoglobin Concentration : 29.5 gm/dL  Auto Neutrophil # : 20.58 K/uL  Auto Lymphocyte # : 1.13 K/uL  Auto Monocyte # : 1.01 K/uL  Auto Eosinophil # : 0.01 K/uL  Auto Basophil # : 0.11 K/uL  Auto Neutrophil % : 82.3 %  Auto Lymphocyte % : 4.5 %  Auto Monocyte % : 4.0 %  Auto Eosinophil % : 0.0 %  Auto Basophil % : 0.4 %    10-04    143  |  108  |  39<H>  ----------------------------<  150<H>  5.5<H>   |  8<LL>  |  3.82<H>    Ca    6.3<LL>      04 Oct 2018 04:10  Phos  7.0     10-04  Mg     2.0     10-04    TPro  5.0<L>  /  Alb  2.3<L>  /  TBili  4.3<H>  /  DBili  x   /  AST  8823<H>  /  ALT  2529<H>  /  AlkPhos  241<H>  10-04    PT/INR - ( 04 Oct 2018 04:10 )   PT: 36.0 sec;   INR: 3.22 ratio         PTT - ( 04 Oct 2018 04:10 )  PTT:37.4 sec    Culture Results:   No growth (10-01 @ 14:43)      Impression:  * HERBIE -- ischemic ATN. Anuric  * Septic shock  * Acidosis -- lactic, HERBIE. Intractable, worse  * Emerging hyperK  * Neisseria bacteremia   * NSTEMI  * Lung nodule, vertebral lesions suspicious for metastatic dz    Recommendations;   * Will initiate HD to tx intractable acidosis, emerging hyperK.   This was discussed with HCP/family at length. Consent on chart.   Will initiate dialysis once temporary access is in place

## 2018-10-04 NOTE — CONSULT NOTE ADULT - PROBLEM SELECTOR RECOMMENDATION 9
The US shows no GB distention.  Given that all liver enzymes trending up as well as the INR, this is more likely acute hepatitis.  Risk of bleeding in the setting of DIC and/or liver failure outweigh questionable benefits-especially given the unclear diagnosis of acalculous cholecystitis (no GB distention, no sludge).  If patientis stable enough for a HIDA scan to confirm diagnosis of acute cholecystitis and correct coagulopathy, that would warrant drainage, but for now I would recommend ID and GI to evaluate for possible hepatitis.   -No percutaneous choletube for now  -Please reconsult if warranted -No role for IR intervention at this time

## 2018-10-04 NOTE — PROGRESS NOTE ADULT - ASSESSMENT
71 y/o F w/hx of breast cancer, DM on metformin presenting with severe sepsis with septic shock, HERBIE, lactic acidosis, coagulopathy likely DIC secondary to infection vs HUS, hypocalcemia, hypokalemia, and hypomagnesemia. Lactic acidosis and DIC likely secondary to nisseria meningitidis infection. Now with acute hepatitis also likely secondary infection vs ischemic.     Neuro: Sedation as needed goal RASS -1 to 0  - Fentanyl PRN, propofol    Resp:   - 6 cc/Kg IBW  - Daily SAT/SBT  - Barrier to extubation is septic shock and metabolic acidosis  - Outpatient follow up of pulmonary nodule    CV: Septic shock due to gram negative bacteremia, likely component of cardiogenic shock as well as there is severely reduced LV function on my bedside echo. Failed dobutamine due to arrythmia. Elevated troponin, likely NSTEMI now downtrending. Appreciate cardiology evaluation.  - Levophed, vaso titrate to goal MAP >= 65  - Amio gtt  - Hydrocortisone  - No heparin due to coagulopathy    Renal: HERBIE now worsening again. Likely ATN vs sepsis induced  - Monitor UOP  - Trend Cr, avoid nephrotoxins  - Lactic acidosis now worsening, started on bicarb gtt  - Plan to initiate HD    ID: Septic shock, nisseria meningitidis. Unlikely meningitis as patient had no meningitic symptoms and was completely lucid prior to intubation, however will cover empirically for possible meningitis. Risk of LP outweighs benefit at this point as patient is coagulopathic. Would just provide empiric coverage for possible meningitis.   - Appreciate ID consult  - Ceftriaxone, Vanc by level  - Vitamin C protocol  - Add diflucan, IV flagyl    GI: Acute hepatitis  - Continue to monitor LFTs  - Repeat abdominal US pending for repeat evaluation of gallbladder, if significant distension present may require IR consult for drainage    Heme: Small amounts of schistocytes seen on smear, unlikely MAHA  - Trend hgb, coags, no current evidence of bleeding. Coagulopathy likely secondary to DIC from infection    FEN  - Tube feeds  - Replete lytes as needed    Attending critical care time 55 minutes .

## 2018-10-04 NOTE — CONSULT NOTE ADULT - ASSESSMENT
72F with PMH Breast Ca, DM2, HTN and unknown PSH , with septic shock with MOSD, lactic acidosis, found to have meningococcal bacteremia.  Elevating liver enzymes and coags, leukocytosis and fever.  No hida ordered 72F with PMH Breast Ca, DM2, HTN, with septic shock with MOSD, lactic acidosis, found to have meningococcal bacteremia.  Elevating liver enzymes and coags, leukocytosis and fever.

## 2018-10-04 NOTE — CONSULT NOTE ADULT - ATTENDING COMMENTS
I examined patient with Surgical PA, at CCU bed .  Plan discussed with CCU PA.
-Patient with septic shock, bacteremia, and RUQ pain.   -No GB distention or sludge on US, therefore highly unlikely to have acalculous cholecystitis. Mild wall thickening is a nonspecific finding.   -CMP shows acute and severe diffuse LFT abnormality (genesis ALT/AST), rather than abnormalities specific to biliary obstruction. This points towards acute hepatitis. Recommend ID and hepatology consultation.  -Patient has severe coagulopathy, likely secondary to liver failure (?acute hepatitis), septic shock +/- DIC. Bleeding risks are too high to warrant percutaneous intervention, especially given questionable diagnosis of cystic duct obstruction (No GB distention or sludge on ultrasound).  -No role for IR intervention at this time. If stable, consider HIDA.  -Pager [phone].

## 2018-10-04 NOTE — PROGRESS NOTE ADULT - SUBJECTIVE AND OBJECTIVE BOX
INTERVAL HPI/OVERNIGHT EVENTS:  Pt. seen and examined at bedside, in no distress intubated and sedated in CCU. Febrile, Tmax 103.5/24hr     Vital Signs Last 24 Hrs  T(C): 38.5 (04 Oct 2018 04:44), Max: 39.7 (03 Oct 2018 16:00)  T(F): 101.3 (04 Oct 2018 04:44), Max: 103.5 (03 Oct 2018 16:00)  HR: 54 (04 Oct 2018 05:29) (54 - 130)  BP: --  RR: 13 (04 Oct 2018 04:00) (13 - 38)  SpO2: 94% (04 Oct 2018 05:29) (57% - 100%)    PHYSICAL EXAM:    GENERAL: NAD  CHEST/LUNG: Clear to ausculation, bilaterally   HEART: S1S2  ABDOMEN: Obese, non distended, soft, unable to illicit tenderness, patient sedated, nonresponsive, no guarding. No organomegaly.   EXTREMITIES:  calf soft, non tender b/l. 2+ distal pulses b/l.     I&O's Detail    02 Oct 2018 07:01  -  03 Oct 2018 07:00  --------------------------------------------------------  IN:    dexmedetomidine Infusion: 1.9 mL    DOBUTamine Infusion: 34.5 mL    Enteral Tube Flush: 50 mL    fentaNYL Infusion.: 207 mL    norepinephrine Infusion: 1131.6 mL    norepinephrine Infusion: 129 mL    ns in tub fed vital15: 330 mL    Plasma: 338 mL    propofol Infusion: 174.9 mL    sodium bicarbonate  Infusion: 700 mL    Solution: 200 mL    Solution: 50 mL    Solution: 100 mL    Solution: 50 mL    Solution: 100 mL    vasopressin Infusion: 50.4 mL  Total IN: 3647.3 mL    OUT:    Indwelling Catheter - Urethral: 1200 mL  Total OUT: 1200 mL    Total NET: 2447.3 mL      03 Oct 2018 07:01  -  04 Oct 2018 06:30  --------------------------------------------------------  IN:    amiodarone Infusion: 266.3 mL    amiodarone Infusion: 149.4 mL    fentaNYL Infusion.: 234 mL    norepinephrine Infusion: 386.9 mL    ns in tub fed vital15: 730 mL    propofol Infusion: 4.6 mL    Solution: 50 mL    Solution: 150 mL    Solution: 100 mL    vasopressin Infusion: 48 mL  Total IN: 2119.2 mL    OUT:    Indwelling Catheter - Urethral: 100 mL  Total OUT: 100 mL    Total NET: 2019.2 mL      LABS:                        8.6    25.05 )-----------( 25       ( 04 Oct 2018 04:10 )             29.2     10-04    143  |  108  |  39<H>  ----------------------------<  150<H>  5.5<H>   |  8<LL>  |  3.82<H>    Ca    6.3<LL>      04 Oct 2018 04:10  Phos  7.0     10-04  Mg     2.0     10-04    TPro  5.0<L>  /  Alb  2.3<L>  /  TBili  4.3<H>  /  DBili  x   /  AST  8823<H>  /  ALT  2529<H>  /  AlkPhos  241<H>  10-04    PT/INR - ( 04 Oct 2018 04:10 )   PT: 36.0 sec;   INR: 3.22 ratio         PTT - ( 04 Oct 2018 04:10 )  PTT:37.4 sec    Culture Results:   No growth (10-01 @ 14:43)    Assessment:   72F with PMH Breast Ca, DM2, HTN and unknown PSH , with septic shock with MOSD, lactic acidosis, found to have meningococcal bacteremia. Surgery consulted for elevated Transaminase, and Abd. US finding of gallbladder wall thickening, r/o Acute Acalculous Cholecystitis  Worsening leukocytosis, lactate, renal function, and LFTs.     Plan:  Continue excellent ICU management, IV ABX    F/u Repeat Abd US to assess gallbladder distention, IR consult for possible percutaneous cholecystostomy tube  Renal  and Cardiology Consult and F/U  F/u labs  Discussed with Dr. Salmon and ICU PA INTERVAL HPI/OVERNIGHT EVENTS:  Pt. seen and examined at bedside, in no distress intubated and sedated in CCU. Febrile, Tmax 103.5/24hr     Vital Signs Last 24 Hrs  T(C): 38.5 (04 Oct 2018 04:44), Max: 39.7 (03 Oct 2018 16:00)  T(F): 101.3 (04 Oct 2018 04:44), Max: 103.5 (03 Oct 2018 16:00)  HR: 54 (04 Oct 2018 05:29) (54 - 130)  BP: --  RR: 13 (04 Oct 2018 04:00) (13 - 38)  SpO2: 94% (04 Oct 2018 05:29) (57% - 100%)    PHYSICAL EXAM:    GENERAL: NAD  CHEST/LUNG: Clear to ausculation, bilaterally   HEART: S1S2  ABDOMEN: Obese, non distended, soft, unable to illicit tenderness, patient sedated, nonresponsive, no guarding. No organomegaly.   EXTREMITIES:  calf soft, non tender b/l. 2+ distal pulses b/l.     I&O's Detail    02 Oct 2018 07:01  -  03 Oct 2018 07:00  --------------------------------------------------------  IN:    dexmedetomidine Infusion: 1.9 mL    DOBUTamine Infusion: 34.5 mL    Enteral Tube Flush: 50 mL    fentaNYL Infusion.: 207 mL    norepinephrine Infusion: 1131.6 mL    norepinephrine Infusion: 129 mL    ns in tub fed vital15: 330 mL    Plasma: 338 mL    propofol Infusion: 174.9 mL    sodium bicarbonate  Infusion: 700 mL    Solution: 200 mL    Solution: 50 mL    Solution: 100 mL    Solution: 50 mL    Solution: 100 mL    vasopressin Infusion: 50.4 mL  Total IN: 3647.3 mL    OUT:    Indwelling Catheter - Urethral: 1200 mL  Total OUT: 1200 mL    Total NET: 2447.3 mL      03 Oct 2018 07:01  -  04 Oct 2018 06:30  --------------------------------------------------------  IN:    amiodarone Infusion: 266.3 mL    amiodarone Infusion: 149.4 mL    fentaNYL Infusion.: 234 mL    norepinephrine Infusion: 386.9 mL    ns in tub fed vital15: 730 mL    propofol Infusion: 4.6 mL    Solution: 50 mL    Solution: 150 mL    Solution: 100 mL    vasopressin Infusion: 48 mL  Total IN: 2119.2 mL    OUT:    Indwelling Catheter - Urethral: 100 mL  Total OUT: 100 mL    Total NET: 2019.2 mL      LABS:                        8.6    25.05 )-----------( 25       ( 04 Oct 2018 04:10 )             29.2     10-04    143  |  108  |  39<H>  ----------------------------<  150<H>  5.5<H>   |  8<LL>  |  3.82<H>    Ca    6.3<LL>      04 Oct 2018 04:10  Phos  7.0     10-04  Mg     2.0     10-04    TPro  5.0<L>  /  Alb  2.3<L>  /  TBili  4.3<H>  /  DBili  x   /  AST  8823<H>  /  ALT  2529<H>  /  AlkPhos  241<H>  10-04    PT/INR - ( 04 Oct 2018 04:10 )   PT: 36.0 sec;   INR: 3.22 ratio         PTT - ( 04 Oct 2018 04:10 )  PTT:37.4 sec    Culture Results:   No growth (10-01 @ 14:43)    Assessment:   72F with PMH Breast Ca, DM2, HTN and unknown PSH , with septic shock with MOSD, lactic acidosis, found to have meningococcal bacteremia. Surgery consulted for elevated Transaminase, and Abd. US finding of gallbladder wall thickening, r/o Acute Acalculous Cholecystitis  Worsening leukocytosis, lactate, renal function, and LFTs.     Plan:  Continue excellent ICU management, IV ABX    F/u Repeat Abd US to assess gallbladder distention, Recommend IR consult for percutaneous cholecystostomy tube  Renal  and Cardiology Consult and F/U  F/u labs  Discussed with Dr. Salmon and ICU PA

## 2018-10-04 NOTE — PROGRESS NOTE ADULT - SUBJECTIVE AND OBJECTIVE BOX
HPI:  Called to see 73 Y/O female w/ PMHx of HTN, DM, breast CA (s/p lumpectomy, radiation/chemo) BIBEMS w/ c/o N/V, malaise, loose stool since night prior to presentation. Upon evaluation in ED, pt was noted to go into [facility] and given 10mg IV cardizem x 1, followed by 30mg PO x 1. Pt was also noted to have an increase in serum lactate from 3.0 to 6.8 after receiving 4L of NS, and becoming hypotensive w/ pressure in the 80's. ICU called for further evaluation. Pt denies recent travel, recent known sick contacts, or change in bowel habits. (01 Oct 2018 04:30)      24 hr events: Clinical status continues to decline. Worsening renal and liver injury. Severe acidosis started on Bicarb gtt, plan for HD.    ## ROS:  [x ] unable to obtain    ## Vitals  ICU Vital Signs Last 24 Hrs  T(C): 37.9 (04 Oct 2018 13:00), Max: 39.7 (03 Oct 2018 16:00)  T(F): 100.2 (04 Oct 2018 13:00), Max: 103.5 (03 Oct 2018 16:00)  HR: 77 (04 Oct 2018 14:00) (54 - 86)  BP: 190/95 (04 Oct 2018 13:41) (190/95 - 190/95)  BP(mean): 119 (04 Oct 2018 13:41) (119 - 119)  ABP: 118/69 (04 Oct 2018 14:00) (88/49 - 177/60)  ABP(mean): 87 (04 Oct 2018 14:00) (59 - 94)  RR: 35 (04 Oct 2018 14:00) (13 - 36)  SpO2: 99% (04 Oct 2018 14:00) (85% - 100%)      ## Physical Exam:  Gen: Elderly female lying in bed, intubated, NAD  HEENT: NC/AT sclerae anicteric  Resp: Intubated, mechanical breath sounds b/l  CV: S1, S2, tachycardic  Abd: Soft, +BS  Ext: + Edema  Neuro: Sedated, unarousable    ## Vent Data  Mode: AC/ CMV (Assist Control/ Continuous Mandatory Ventilation)  RR (machine): 35  TV (machine): 290  FiO2: 45  PEEP: 5  ITime: 0.7  MAP: 8  PIP: 28      ## Labs:  Chem:  10-04    143  |  108  |  39<H>  ----------------------------<  150<H>  5.5<H>   |  8<LL>  |  3.82<H>    Ca    6.3<LL>      04 Oct 2018 04:10  Phos  7.0     10-04  Mg     2.0     10-04    TPro  5.0<L>  /  Alb  2.3<L>  /  TBili  4.3<H>  /  DBili  x   /  AST  8823<H>  /  ALT  2529<H>  /  AlkPhos  241<H>  10-04    LIVER FUNCTIONS - ( 04 Oct 2018 04:10 )  Alb: 2.3 g/dL / Pro: 5.0 gm/dL / ALK PHOS: 241 U/L / ALT: 2529 U/L / AST: 8823 U/L / GGT: x           CBC:                        8.6    25.05 )-----------( 25       ( 04 Oct 2018 04:10 )             29.2     Coags:  PT/INR - ( 04 Oct 2018 04:10 )   PT: 36.0 sec;   INR: 3.22 ratio         PTT - ( 04 Oct 2018 04:10 )  PTT:37.4 sec        ## Cardiac  CARDIAC MARKERS ( 04 Oct 2018 13:21 )  4.590 ng/mL / x     / x     / x     / x      CARDIAC MARKERS ( 04 Oct 2018 04:10 )  5.670 ng/mL / x     / 1831 U/L / x     / x      CARDIAC MARKERS ( 03 Oct 2018 04:10 )  6.220 ng/mL / x     / x     / x     / x      CARDIAC MARKERS ( 02 Oct 2018 22:02 )  6.120 ng/mL / x     / 953 U/L / x     / 26.8 ng/mL  CARDIAC MARKERS ( 02 Oct 2018 14:57 )  7.430 ng/mL / x     / 479 U/L / x     / x            ## Blood Gas  ABG - ( 04 Oct 2018 10:37 )  pH, Arterial: x     pH, Blood: 7.15  /  pCO2: 29    /  pO2: 159   / HCO3: 10    / Base Excess: -18.0 /  SaO2: 98                  #I/Os  I&O's Detail    03 Oct 2018 07:01  -  04 Oct 2018 07:00  --------------------------------------------------------  IN:    amiodarone Infusion: 266.3 mL    amiodarone Infusion: 149.4 mL    fentaNYL Infusion.: 234 mL    norepinephrine Infusion: 401.2 mL    ns in tub fed vital15: 730 mL    propofol Infusion: 4.6 mL    Solution: 50 mL    Solution: 150 mL    Solution: 100 mL    vasopressin Infusion: 48 mL  Total IN: 2133.5 mL    OUT:    Indwelling Catheter - Urethral: 100 mL  Total OUT: 100 mL    Total NET: 2033.5 mL      04 Oct 2018 07:01  -  04 Oct 2018 14:38  --------------------------------------------------------  IN:    amiodarone Infusion: 66.4 mL    fentaNYL Infusion.: 6 mL    norepinephrine Infusion: 49.6 mL    Platelets - Single Donor: 250 mL    sodium bicarbonate  Infusion: 400 mL    Solution: 50 mL    vasopressin Infusion: 9.6 mL  Total IN: 831.6 mL    OUT:    Indwelling Catheter - Urethral: 10 mL  Total OUT: 10 mL    Total NET: 821.6 mL          ## Imaging:    ## Medications:  MEDICATIONS  (STANDING):  ALBUTerol    0.083% 2.5 milliGRAM(s) Nebulizer every 6 hours  amiodarone    Tablet 200 milliGRAM(s) Oral daily  amiodarone Infusion 0.5 mG/Min (16.667 mL/Hr) IV Continuous <Continuous>  ascorbic acid IVPB 1500 milliGRAM(s) IV Intermittent every 6 hours  cefTRIAXone   IVPB      cefTRIAXone   IVPB 2 Gram(s) IV Intermittent every 12 hours  chlorhexidine 0.12% Liquid 15 milliLiter(s) Swish and Spit two times a day  chlorhexidine 4% Liquid 1 Application(s) Topical <User Schedule>  dextrose 5%. 1000 milliLiter(s) (50 mL/Hr) IV Continuous <Continuous>  dextrose 50% Injectable 12.5 Gram(s) IV Push once  dextrose 50% Injectable 25 Gram(s) IV Push once  dextrose 50% Injectable 25 Gram(s) IV Push once  fentaNYL   Infusion. 0.5 MICROgram(s)/kG/Hr (0.766 mL/Hr) IV Continuous <Continuous>  fluconAZOLE IVPB      fluconAZOLE IVPB 100 milliGRAM(s) IV Intermittent once  hydrocortisone sodium succinate Injectable 50 milliGRAM(s) IV Push every 6 hours  insulin lispro (HumaLOG) corrective regimen sliding scale   SubCutaneous every 6 hours  metroNIDAZOLE  IVPB      metroNIDAZOLE  IVPB 500 milliGRAM(s) IV Intermittent every 8 hours  midodrine 10 milliGRAM(s) Oral every 8 hours  norepinephrine Infusion 1 MICROgram(s)/kG/Min (71.813 mL/Hr) IV Continuous <Continuous>  pantoprazole   Suspension 40 milliGRAM(s) Oral daily  propofol Infusion 5 MICROgram(s)/kG/Min (2.298 mL/Hr) IV Continuous <Continuous>  sodium bicarbonate  Infusion 0.196 mEq/kG/Hr (100 mL/Hr) IV Continuous <Continuous>  thiamine IVPB 200 milliGRAM(s) IV Intermittent <User Schedule>  vancomycin  IVPB      vancomycin  IVPB 750 milliGRAM(s) IV Intermittent every 24 hours  vasopressin Infusion 0.04 Unit(s)/Min (2.4 mL/Hr) IV Continuous <Continuous>    MEDICATIONS  (PRN):  acetaminophen   Tablet .. 650 milliGRAM(s) Oral every 6 hours PRN Temp greater or equal to 38C (100.4F), Mild Pain (1 - 3)  dextrose 40% Gel 15 Gram(s) Oral once PRN Blood Glucose LESS THAN 70 milliGRAM(s)/deciliter  glucagon  Injectable 1 milliGRAM(s) IntraMuscular once PRN Glucose LESS THAN 70 milligrams/deciliter

## 2018-10-04 NOTE — PROGRESS NOTE ADULT - SUBJECTIVE AND OBJECTIVE BOX
Assessment:  Asked to assess this patient with septic shock.  Patient remains in intensive care unit with full support.  Noted troponin level increased significantly.  This very likely may be a cardiac event however in the setting of acute sepsis with a preserved ejection fraction on a recent diagnostic echocardiogram,  repeat a limited bedside TTE noted, no new wall motion abnormality  Renal function and overall clinical picture is worsening  The initiation of a full dose Aspirin as tolerable is recommended.  Continue intravenous antibiotics

## 2018-10-04 NOTE — CONSULT NOTE ADULT - SUBJECTIVE AND OBJECTIVE BOX
72F with PMH Breast Ca, DM2, HTN and unknown PSH , with septic shock with MOSD, lactic acidosis, found to have meningococcal bacteremia. Surgery consulted for elevated Transaminase, and Abd. US finding of gallbladder wall thickening.  Most likely Liver injury secondary to septic shock.  Will consider Acute Acalculous Cholecystitis as a Differential Diagnosis.  Acute Renal failure.  Meningococcal bacteremia, sepsis.   Elevated Troponin.  Critical patient.    -IR consulted for possible percutaneous cholecystostomy tube placement.  There is no Hida scan.  Pt very sick on 2 pressors.  Abdominal US to be repeated.  Pt intubated/sedated    PAST MEDICAL & SURGICAL HISTORY:  HTN (hypertension)  DM (diabetes mellitus)  Breast cancer  H/O lumpectomy      Allergies    No Known Allergies    Intolerances        MEDICATIONS  (STANDING):  ALBUTerol    0.083% 2.5 milliGRAM(s) Nebulizer every 6 hours  amiodarone    Tablet 200 milliGRAM(s) Oral daily  amiodarone Infusion 0.5 mG/Min (16.667 mL/Hr) IV Continuous <Continuous>  ascorbic acid IVPB 1500 milliGRAM(s) IV Intermittent every 6 hours  cefTRIAXone   IVPB      cefTRIAXone   IVPB 2 Gram(s) IV Intermittent every 12 hours  chlorhexidine 0.12% Liquid 15 milliLiter(s) Swish and Spit two times a day  chlorhexidine 4% Liquid 1 Application(s) Topical <User Schedule>  dextrose 5%. 1000 milliLiter(s) (50 mL/Hr) IV Continuous <Continuous>  dextrose 50% Injectable 12.5 Gram(s) IV Push once  dextrose 50% Injectable 25 Gram(s) IV Push once  dextrose 50% Injectable 25 Gram(s) IV Push once  fentaNYL   Infusion. 0.5 MICROgram(s)/kG/Hr (0.766 mL/Hr) IV Continuous <Continuous>  hydrocortisone sodium succinate Injectable 50 milliGRAM(s) IV Push every 6 hours  insulin lispro (HumaLOG) corrective regimen sliding scale   SubCutaneous every 6 hours  metroNIDAZOLE  IVPB      metroNIDAZOLE  IVPB 500 milliGRAM(s) IV Intermittent every 8 hours  midodrine 10 milliGRAM(s) Oral every 8 hours  norepinephrine Infusion 1 MICROgram(s)/kG/Min (71.813 mL/Hr) IV Continuous <Continuous>  pantoprazole   Suspension 40 milliGRAM(s) Oral daily  propofol Infusion 5 MICROgram(s)/kG/Min (2.298 mL/Hr) IV Continuous <Continuous>  sodium bicarbonate  Infusion 0.196 mEq/kG/Hr (100 mL/Hr) IV Continuous <Continuous>  thiamine IVPB 200 milliGRAM(s) IV Intermittent <User Schedule>  vancomycin  IVPB      vancomycin  IVPB 750 milliGRAM(s) IV Intermittent every 24 hours  vasopressin Infusion 0.04 Unit(s)/Min (2.4 mL/Hr) IV Continuous <Continuous>    MEDICATIONS  (PRN):  acetaminophen   Tablet .. 650 milliGRAM(s) Oral every 6 hours PRN Temp greater or equal to 38C (100.4F), Mild Pain (1 - 3)  dextrose 40% Gel 15 Gram(s) Oral once PRN Blood Glucose LESS THAN 70 milliGRAM(s)/deciliter  glucagon  Injectable 1 milliGRAM(s) IntraMuscular once PRN Glucose LESS THAN 70 milligrams/deciliter        SOCIAL HISTORY:    FAMILY HISTORY:        PHYSICAL EXAM:    Vital Signs Last 24 Hrs  T(C): 37.5 (04 Oct 2018 11:00), Max: 39.7 (03 Oct 2018 16:00)  T(F): 99.5 (04 Oct 2018 11:00), Max: 103.5 (03 Oct 2018 16:00)  HR: 66 (04 Oct 2018 11:30) (54 - 86)  BP: --  BP(mean): --  RR: 35 (04 Oct 2018 11:30) (13 - 35)  SpO2: 87% (04 Oct 2018 11:30) (66% - 100%)    General:  Intubated and sedated, nonresponsive   Eyes : Pinpoint pupils, fixed  HENT:  WNL, no JVD  Chest: Mechanical breath sounds bilateral, decreased base breath sounds.  Cardiovascular:  S1,S2 sinus rythm.  Abdomen: Old healed midline surgical scar present. Obese, Nondistended, Soft, nontender (patient sedated), no guarding, no rigidity.                  No organomegaly.  Extremities: No pitting edema to LEs b/l. 2+ peripheral pulses b/l.   Skin:  No rash  Musculoskeletal:  normal .  Neuro/Psych:  Intubated and sedated. Nonresponsive.       LABS:                        8.6    25.05 )-----------( 25       ( 04 Oct 2018 04:10 )             29.2     10-04    143  |  108  |  39<H>  ----------------------------<  150<H>  5.5<H>   |  8<LL>  |  3.82<H>    Ca    6.3<LL>      04 Oct 2018 04:10  Phos  7.0     10-04  Mg     2.0     10-04    TPro  5.0<L>  /  Alb  2.3<L>  /  TBili  4.3<H>  /  DBili  x   /  AST  8823<H>  /  ALT  2529<H>  /  AlkPhos  241<H>  10-04    PT/INR - ( 04 Oct 2018 04:10 )   PT: 36.0 sec;   INR: 3.22 ratio         PTT - ( 04 Oct 2018 04:10 )  PTT:37.4 sec      RADIOLOGY & ADDITIONAL STUDIES:  < from: US Abdomen Limited (10.04.18 @ 10:52) >  IMPRESSION:     Borderline gallbladder wall thickening (3-4 mm).  Pericholecystic fluid.  No cholelithiasis or biliary ductal dilatation.  Right pleural effusion.    < end of copied text >    < from: US Abdomen Complete (10.02.18 @ 21:47) >  IMPRESSION: Noevidence of gallstones.  Gallbladder wall thickening to   approximately 7-9 mm.  Positive sonographic Dye sign.  The findings   may represent a calculus cholecystitis in the proper clinical scenario.    Further evaluation with cross-sectional imaging and/or a nuclear medicine   HIDA scan can be performed as clinically warranted.      < end of copied text >    < from: CT Abdomen and Pelvis w/ IV Cont (09.30.18 @ 22:13) >  IMPRESSION:  CT Chest:   8 mm spiculated right lower lobe nodule, concerning for primary or   metastatic neoplasm.     Partial right mastectomy with coarse dystrophic calcifications and soft   tissue density in the right breast; correlate with prior mammographic   imaging.    CT Abdomen and Pelvis:   No bowel obstruction or acute findings.     Lucent lesions in theT11 and L4 vertebral bodies with features favoring   hemangiomas, although further imaging recommended to exclude presence of   metabolic activity (if not already performed).    PET CT may be helpful to further characterize both the right lower lobe   pulmonary nodule and vertebral body lesions.    Call Back:  I discussed this case with Dr. Thompson at 11:15 PM on   9/30/2018.  Hospital policies for call back including read back policy   were followed. The verbal communication call back supplements this   written report.    < end of copied text > 72F with PMH Breast Ca, DM2, HTN and unknown PSH , with septic shock with MOSD, lactic acidosis, found to have meningococcal bacteremia. Surgery consulted for elevated Transaminase, and Abd. US finding of gallbladder wall thickening.  Most likely Liver injury secondary to septic shock.  Will consider Acute Acalculous Cholecystitis as a Differential Diagnosis.  Acute Renal failure.  Meningococcal bacteremia, sepsis.   Elevated Troponin.  Critical patient.    -IR consulted for possible percutaneous cholecystostomy tube placement.      PAST MEDICAL & SURGICAL HISTORY:  HTN (hypertension)  DM (diabetes mellitus)  Breast cancer  H/O lumpectomy    Allergies: NKDA    MEDICATIONS  (STANDING):  ALBUTerol    0.083% 2.5 milliGRAM(s) Nebulizer every 6 hours  amiodarone Infusion 0.5 mG/Min (16.667 mL/Hr) IV Continuous <Continuous>  ascorbic acid IVPB 1500 milliGRAM(s) IV Intermittent every 6 hours  cefTRIAXone   IVPB      fentaNYL   Infusion. 0.5 MICROgram(s)/kG/Hr (0.766 mL/Hr) IV Continuous <Continuous>  hydrocortisone sodium succinate Injectable 50 milliGRAM(s) IV Push every 6 hours  insulin lispro (HumaLOG) corrective regimen sliding scale   SubCutaneous every 6 hour  metroNIDAZOLE  IVPB 500 milliGRAM(s) IV Intermittent every 8 hours  midodrine 10 milliGRAM(s) Oral every 8 hours  norepinephrine Infusion 1 MICROgram(s)/kG/Min (71.813 mL/Hr) IV Continuous <Continuous>  pantoprazole   Suspension 40 milliGRAM(s) Oral daily  propofol Infusion 5 MICROgram(s)/kG/Min (2.298 mL/Hr) IV Continuous <Continuous>  sodium bicarbonate  Infusion 0.196 mEq/kG/Hr (100 mL/Hr) IV Continuous <Continuous>  thiamine IVPB 200 milliGRAM(s) IV Intermittent <User Schedule>  vancomycin  IVPB 75 milliGRAM(s) IV Intermittent every 24 hours  vasopressin Infusion 0.04 Unit(s)/Min (2.4 mL/Hr) IV Continuous <Continuous>    PHYSICAL EXAM:    Vital Signs Last 24 Hrs  T(C): 37.5   T(F): 99.5  HR: 66 (04 Oct 2018 11:30) (54 - 86)  RR: 35 (04 Oct 2018 11:30) (13 - 35)  SpO2: 87% (04 Oct 2018 11:30) (66% - 100%)  General:  Intubated and sedated, nonresponsive   HENT:  WNL, no JVD  Chest: Mechanical breath sounds bilateral, decreased base breath sounds.  Cardiovascular:  S1,S2 sinus rythm.  Abdomen: Old healed midline surgical scar present. Obese, Nondistended, Soft, nontender (patient sedated), no guarding, no rigidity.  Extremities: No pitting edema to LEs b/l. 2+ peripheral pulses b/l.     LABS:                        8.6    25.05 )-----------( 25       ( 04 Oct 2018 04:10 )             29.2     10-04    143  |  108  |  39<H>  ----------------------------<  150<H>  5.5<H>   |  8<LL>  |  3.82<H>    Ca    6.3<LL>      04 Oct 2018 04:10  Phos  7.0     10-04  Mg     2.0     10-04    TPro  5.0<L>  /  Alb  2.3<L>  /  TBili  4.3<H>  /  DBili  x   /  AST  8823<H>  /  ALT  2529<H>  /  AlkPhos  241<H>  10-04    PT/INR - ( 04 Oct 2018 04:10 )   PT: 36.0 sec;   INR: 3.22 ratio         PTT - ( 04 Oct 2018 04:10 )  PTT:37.4 sec      RADIOLOGY & ADDITIONAL STUDIES:  < from: US Abdomen Limited (10.04.18 @ 10:52) >  IMPRESSION:     Borderline gallbladder wall thickening (3-4 mm).  Pericholecystic fluid.  No cholelithiasis or biliary ductal dilatation.  Right pleural effusion.    < end of copied text >    < from: US Abdomen Complete (10.02.18 @ 21:47) >  IMPRESSION: Noevidence of gallstones.  Gallbladder wall thickening to   approximately 7-9 mm.  Positive sonographic Dye sign.  The findings   may represent a calculus cholecystitis in the proper clinical scenario.    Further evaluation with cross-sectional imaging and/or a nuclear medicine   HIDA scan can be performed as clinically warranted.      < end of copied text >    < from: CT Abdomen and Pelvis w/ IV Cont (09.30.18 @ 22:13) >  IMPRESSION:  CT Chest:   8 mm spiculated right lower lobe nodule, concerning for primary or   metastatic neoplasm.     Partial right mastectomy with coarse dystrophic calcifications and soft   tissue density in the right breast; correlate with prior mammographic   imaging.    CT Abdomen and Pelvis:   No bowel obstruction or acute findings.     Lucent lesions in theT11 and L4 vertebral bodies with features favoring   hemangiomas, although further imaging recommended to exclude presence of   metabolic activity (if not already performed).

## 2018-10-04 NOTE — PROGRESS NOTE ADULT - SUBJECTIVE AND OBJECTIVE BOX
HPI:    On day #    Vital Signs Last 24 Hrs  T(C): 37.5 (04 Oct 2018 11:00), Max: 39.7 (03 Oct 2018 16:00)  T(F): 99.5 (04 Oct 2018 11:00), Max: 103.5 (03 Oct 2018 16:00)  HR: 73 (04 Oct 2018 13:28) (54 - 86)  BP: --  BP(mean): --  RR: 18 (04 Oct 2018 13:28) (11 - 35)  SpO2: 98% (04 Oct 2018 13:28) (84% - 100%)  Mode: AC/ CMV (Assist Control/ Continuous Mandatory Ventilation)  RR (machine): 35  TV (machine): 290  FiO2: 45  PEEP: 5  ITime: 1  MAP: 16  PIP: 27        Supplemental O2:      PHYSICAL EXAM  General:    HEENT:    Neck:    Heart:    Lungs:    Abdomen:    Extremities:    Skin:      I&O's Summary    03 Oct 2018 07:01  -  04 Oct 2018 07:00  --------------------------------------------------------  IN: 2133.5 mL / OUT: 100 mL / NET: 2033.5 mL    04 Oct 2018 07:01  -  04 Oct 2018 13:45  --------------------------------------------------------  IN: 831.6 mL / OUT: 10 mL / NET: 821.6 mL        CBC Full  -  ( 04 Oct 2018 04:10 )  WBC Count : 25.05 K/uL  Hemoglobin : 8.6 g/dL  Hematocrit : 29.2 %  Platelet Count - Automated : 25 K/uL  Mean Cell Volume : 92.7 fl  Mean Cell Hemoglobin : 27.3 pg  Mean Cell Hemoglobin Concentration : 29.5 gm/dL  Auto Neutrophil # : 20.58 K/uL  Auto Lymphocyte # : 1.13 K/uL  Auto Monocyte # : 1.01 K/uL  Auto Eosinophil # : 0.01 K/uL  Auto Basophil # : 0.11 K/uL  Auto Neutrophil % : 82.3 %  Auto Lymphocyte % : 4.5 %  Auto Monocyte % : 4.0 %  Auto Eosinophil % : 0.0 %  Auto Basophil % : 0.4 %    10-04    143  |  108  |  39<H>  ----------------------------<  150<H>  5.5<H>   |  8<LL>  |  3.82<H>    Ca    6.3<LL>      04 Oct 2018 04:10  Phos  7.0     10-04  Mg     2.0     10-04    TPro  5.0<L>  /  Alb  2.3<L>  /  TBili  4.3<H>  /  DBili  x   /  AST  8823<H>  /  ALT  2529<H>  /  AlkPhos  241<H>  10-04    LIVER FUNCTIONS - ( 04 Oct 2018 04:10 )  Alb: 2.3 g/dL / Pro: 5.0 gm/dL / ALK PHOS: 241 U/L / ALT: 2529 U/L / AST: 8823 U/L / GGT: x             PT/INR - ( 04 Oct 2018 04:10 )   PT: 36.0 sec;   INR: 3.22 ratio         PTT - ( 04 Oct 2018 04:10 )  PTT:37.4 sec    ABG - ( 04 Oct 2018 10:37 )  pH, Arterial: x     pH, Blood: 7.15  /  pCO2: 29    /  pO2: 159   / HCO3: 10    / Base Excess: -18.0 /  SaO2: 98                CARDIAC MARKERS ( 04 Oct 2018 04:10 )  5.670 ng/mL / x     / 1831 U/L / x     / x      CARDIAC MARKERS ( 03 Oct 2018 04:10 )  6.220 ng/mL / x     / x     / x     / x      CARDIAC MARKERS ( 02 Oct 2018 22:02 )  6.120 ng/mL / x     / 953 U/L / x     / 26.8 ng/mL  CARDIAC MARKERS ( 02 Oct 2018 14:57 )  7.430 ng/mL / x     / 479 U/L / x     / x                Sedimentation Rate, Erythrocyte: 23 mm/hr (10-03 @ 04:10)      Influenza A Rapid Screen: Negative (10-03 @ 04:10)  Influenza B Rapid Screen: Negative (10-03 @ 04:10)  Influenza A Rapid Screen: Negative (09-30 @ 21:20)  Influenza B Rapid Screen: Negative (09-30 @ 21:20)      Vancomycin Level, Trough: 12.2 ug/mL (10-04 @ 10:44)    Lipase, Serum: 49 U/L (10-02 @ 22:02)    Amylase, Serum Total: 231 U/L (10-02 @ 22:02)        MICROBIOLOGY:  Specimen Source: .Sputum Sputum (10-03 @ 08:44)  Culture Results:   Normal Respiratory Pam present (10-03 @ 08:44)    Specimen Source: .Urine Clean Catch (Midstream) (10-01 @ 14:43)  Culture Results:   No growth (10-01 @ 14:43)    Specimen Source: .Blood Blood (09-30 @ 22:52)  Culture Results:   Growth in aerobic bottle: Neisseria meningitidis "Susceptibilities not  performed"  Isolate sent to  [facility]  for confirmation.      Specimen Source: .Blood Blood (09-30 @ 22:52)  Culture Results:   Growth in aerobic bottle: Neisseria meningitidis "Susceptibilities not  performed"  See previous culture [id number] (09-30 @ 22:52)                        Radiology:    Impression:    Suggestions: TMAX - 103.5    On day # 3 Rocephin    Vital Signs Last 24 Hrs  T(C): 37.5 (04 Oct 2018 11:00), Max: 39.7 (03 Oct 2018 16:00)  T(F): 99.5 (04 Oct 2018 11:00), Max: 103.5 (03 Oct 2018 16:00)  HR: 73 (04 Oct 2018 13:28) (54 - 86)  BP: --  BP(mean): --  RR: 18 (04 Oct 2018 13:28) (11 - 35)  SpO2: 98% (04 Oct 2018 13:28) (84% - 100%)  Mode: AC/ CMV (Assist Control/ Continuous Mandatory Ventilation)  RR (machine): 35  TV (machine): 290  FiO2: 45  PEEP: 5  ITime: 1  MAP: 16  PIP: 27  Supplemental O2:  on 45% FIO2 + 5 PEEP on ventilator      Remains on ventilator and sedated, and on pressor support with Norepinephrine and Vasopressin now.  On increased FIO2 now as well at 45% + 5 PEEP.  Above notes appreciated - seen by Surgery and IR for evaluation for possible percutaneous Cholecystostomy tube placement - noted markedly elevated LFT's and TBili increased to 4.5 today.  Also with leukocytosis and worsening thrombocytopenia.  A-line and Vas Cath placed today - patient to begin dialysis today.        PHYSICAL EXAM  General:  sedated on ventilator, lying flat in bed at present, and with ET-tube and NGT in place  HEENT:  conj pink, sclerae mildly icteric,, Lt eye with conjunctival hemorrhage still noted, orally intubated and with NGT in place with feedings in progress  Neck:  semi-supple, no nodes noted            Lt IJ CVP in place - site clean with dressing dry and intact - placed 10/2/18   Heart:  Irreg R  Lungs:  decreased BS at bases bilaterally              some upper airway congestion  Abdomen:  BS+, soft, appears nontender to palpation now  Rt. Femoral A-line in place now - site obscurred and dressing in place - placed today  Lt Femoral Vas Cath in place now - site obscurred and dressing intact - placed today  Extremities:  1-2+ edema of the LE's                     arms and hands remain swollen as well                     no palmar or plantar lesions noted  Skin:  warm, dry, no rash noted  Serna in place with some trudi-colored urine in the bag        I&O's Summary :    03 Oct 2018 07:01  -  04 Oct 2018 07:00  --------------------------------------------------------  IN: 2133.5 mL / OUT: 100 mL / NET: 2033.5 mL    04 Oct 2018 07:01  -  04 Oct 2018 13:45  --------------------------------------------------------  IN: 831.6 mL / OUT: 10 mL / NET: 821.6 mL        LABS:  CBC Full  -  ( 04 Oct 2018 04:10 )  WBC Count : 25.05 K/uL  Hemoglobin : 8.6 g/dL  Hematocrit : 29.2 %  Platelet Count - Automated : 25 K/uL  Mean Cell Volume : 92.7 fl  Mean Cell Hemoglobin : 27.3 pg  Mean Cell Hemoglobin Concentration : 29.5 gm/dL  Auto Neutrophil # : 20.58 K/uL  Auto Lymphocyte # : 1.13 K/uL  Auto Monocyte # : 1.01 K/uL  Auto Eosinophil # : 0.01 K/uL  Auto Basophil # : 0.11 K/uL  Auto Neutrophil % : 82.3 %  Auto Lymphocyte % : 4.5 %  Auto Monocyte % : 4.0 %  Auto Eosinophil % : 0.0 %  Auto Basophil % : 0.4 %    10-04    143  |  108  |  39<H>  ----------------------------<  150<H>  5.5<H>   |  8<LL>  |  3.82<H>    Ca    6.3<LL>      04 Oct 2018 04:10  Phos  7.0     10-04  Mg     2.0     10-04    TPro  5.0<L>  /  Alb  2.3<L>  /  TBili  4.3<H>  /  DBili  x   /  AST  8823<H>  /  ALT  2529<H>  /  AlkPhos  241<H>  10-04    LIVER FUNCTIONS - ( 04 Oct 2018 04:10 )  Alb: 2.3 g/dL / Pro: 5.0 gm/dL / ALK PHOS: 241 U/L / ALT: 2529 U/L / AST: 8823 U/L / GGT: x             PT/INR - ( 04 Oct 2018 04:10 )   PT: 36.0 sec;   INR: 3.22 ratio         PTT - ( 04 Oct 2018 04:10 )  PTT:37.4 sec    ABG - ( 04 Oct 2018 10:37 )  pH, Arterial: x     pH, Blood: 7.15  /  pCO2: 29    /  pO2: 159   / HCO3: 10    / Base Excess: -18.0 /  SaO2: 98          CARDIAC MARKERS ( 04 Oct 2018 04:10 )  5.670 ng/mL / x     / 1831 U/L / x     / x        CARDIAC MARKERS ( 03 Oct 2018 04:10 )  6.220 ng/mL / x     / x     / x     / x        CARDIAC MARKERS ( 02 Oct 2018 22:02 )  6.120 ng/mL / x     / 953 U/L / x     / 26.8 ng/mL    CARDIAC MARKERS ( 02 Oct 2018 14:57 )  7.430 ng/mL / x     / 479 U/L / x     / x          Sedimentation Rate, Erythrocyte: 23 mm/hr (10-03 @ 04:10)      Influenza A Rapid Screen: Negative (10-03 @ 04:10)  Influenza B Rapid Screen: Negative (10-03 @ 04:10)  Influenza A Rapid Screen: Negative (09-30 @ 21:20)  Influenza B Rapid Screen: Negative (09-30 @ 21:20)      Vancomycin Level, Trough: 12.2 ug/mL (10-04 @ 10:44)    Lipase, Serum: 49 U/L (10-02 @ 22:02)    Amylase, Serum Total: 231 U/L (10-02 @ 22:02)    Lactate - 13.5        MICROBIOLOGY:  Specimen Source: .Sputum Sputum (10-03 @ 08:44)  Gram Stain:   Numerous polymorphonuclear leukocytes  Rare Squamous epithelial cells per low power field  Numerous Yeast per oil power field (10.03.18 @ 08:44)  Culture Results:   Normal Respiratory Pam present (10-03 @ 08:44)    Specimen Source: .Urine Clean Catch (Midstream) (10-01 @ 14:43)  Culture Results:   No growth (10-01 @ 14:43)    Specimen Source: .Blood Blood (09-30 @ 22:52)  Culture Results:   Growth in aerobic bottle: Neisseria meningitidis "Susceptibilities not  performed"  Isolate sent to  [place] [facility]  for confirmation.      Specimen Source: .Blood Blood (09-30 @ 22:52)  Culture Results:   Growth in aerobic bottle: Neisseria meningitidis "Susceptibilities not  performed"  See previous culture [id number] (09-30 @ 22:52)            Radiology:  CXR - < from: Xray Chest 1 View AP/PA. (10.04.18 @ 07:28) >    FINDINGS:      Mildly enlarged    Vascular markings are inferior    There is scattered opacities in both lungs. There is atelectasis in the   left lung.    Support devices in situ.    No pneumothorax seen.    IMPRESSION:    Scattered airspace disease and congestion in both lungs. Left lung   atelectatic changes. Support devices in situ.                                  < from: US Abdomen Limited (10.04.18 @ 10:52) >    INTERPRETATION:  CLINICAL INFORMATION: Right upper quadrant abdominal pain    COMPARISON: 10/2/2018    TECHNIQUE: Sonography of the right upper quadrant.     FINDINGS:    Liver: Within normal limits.    Bile ducts: Normal caliber. Common bile duct measures 2-3 mm.     Gallbladder: No cholelithiasis. Borderline wall thickening (3-4 mm).   Pericholecystic fluid.        Pancreas: Visualized portions are within normal limits.    Right kidney: 9.4 cm. No hydronephrosis.        Ascites: None.    IVC: Visualized portions are within normal limits.    A right pleural effusion is noted.    IMPRESSION:     Borderline gallbladder wall thickening (3-4 mm).  Pericholecystic fluid.  No cholelithiasis or biliary ductal dilatation.  Right pleural effusion.          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 Likely with associated shock liver now and DIC with abnormal PT, INR, and PTT and marked thrombocytopenia.  Noted Sputum Gm Stain with many PMN's and many Yeast seen although Cx finalized as Normal Resp Pam.      Suggestions:  Will continue present ab rx with Rocephin 2gm q 12 hrs to cover for the possibility of associated Meningitis ( LP was unsuccessful ) and add Diflucan to rx  the Yeast found in the sputum in view of the many PMN's seen on the Gram Stain.  I spoke with the Microbiology lab and requested sensitivity testing to be done on the Meningococcal isolates from the blood ( apparently this is not done routinely but will be done upon request ).  Blood Cx isolates have also been sent to the [facility] lab for confirmatory testing.  Condition remains guarded at this time.  Will maintain Droplet Precautions until sensitivity testing results are available.

## 2018-10-05 NOTE — PROGRESS NOTE ADULT - SUBJECTIVE AND OBJECTIVE BOX
HPI:  Called to see 71 Y/O female w/ PMHx of HTN, DM, breast CA (s/p lumpectomy, radiation/chemo) BIBEMS w/ c/o N/V, malaise, loose stool since night prior to presentation. Upon evaluation in ED, pt was noted to go into RE[facility]AN and given 10mg IV cardizem x 1, followed by 30mg PO x 1. Pt was also noted to have an increase in serum lactate from 3.0 to 6.8 after receiving 4L of NS, and becoming hypotensive w/ pressure in the 80's. ICU called for further evaluation. Pt denies recent travel, recent known sick contacts, or change in bowel habits. (01 Oct 2018 04:30)      24 hr events: Started on dialysis, tolerated well    ## ROS:  [x ] unable to obtain    ## Vitals  ICU Vital Signs Last 24 Hrs  T(C): 37 (05 Oct 2018 09:00), Max: 38.3 (04 Oct 2018 16:45)  T(F): 98.6 (05 Oct 2018 09:00), Max: 100.9 (04 Oct 2018 16:45)  HR: 89 (05 Oct 2018 10:30) (52 - 117)  BP: 133/66 (05 Oct 2018 10:30) (101/50 - 190/95)  BP(mean): 80 (05 Oct 2018 10:30) (51 - 119)  ABP: 118/69 (04 Oct 2018 14:00) (115/40 - 129/50)  ABP(mean): 87 (04 Oct 2018 14:00) (61 - 87)  RR: 35 (05 Oct 2018 10:30) (16 - 37)  SpO2: 98% (05 Oct 2018 10:30) (75% - 100%)      ## Physical Exam:  Gen: Elderly female lying in bed, intubated, NAD  HEENT: NC/AT sclerae anicteric  Resp: Intubated, mechanical breath sounds b/l  CV: S1, S2, tachycardic  Abd: Soft, +BS  Ext: + Edema  Neuro: Sedated, unarousable    ## Vent Data  Mode: AC/ CMV (Assist Control/ Continuous Mandatory Ventilation)  RR (machine): 35  TV (machine): 290  FiO2: 100  PEEP: 8  ITime: 0.7  MAP: 16  PIP: 30      ## Labs:  Chem:  10-05    142  |  102  |  42<H>  ----------------------------<  226<H>  4.6   |  17<L>  |  4.06<H>    Ca    6.6<L>      05 Oct 2018 04:15  Phos  6.0     10-05  Mg     2.4     10-05    TPro  4.7<L>  /  Alb  2.2<L>  /  TBili  5.7<H>  /  DBili  5.56<H>  /  AST  8267<H>  /  ALT  2787<H>  /  AlkPhos  469<H>  10-05    LIVER FUNCTIONS - ( 05 Oct 2018 04:15 )  Alb: 2.2 g/dL / Pro: 4.7 gm/dL / ALK PHOS: 469 U/L / ALT: 2787 U/L / AST: 8267 U/L / GGT: x           CBC:                        6.6    31.49 )-----------( 56       ( 05 Oct 2018 04:15 )             21.3     Coags:  PT/INR - ( 04 Oct 2018 04:10 )   PT: 36.0 sec;   INR: 3.22 ratio         PTT - ( 04 Oct 2018 04:10 )  PTT:37.4 sec        ## Cardiac  CARDIAC MARKERS ( 04 Oct 2018 22:23 )  3.310 ng/mL / x     / x     / x     / x      CARDIAC MARKERS ( 04 Oct 2018 13:21 )  4.590 ng/mL / x     / x     / x     / x      CARDIAC MARKERS ( 04 Oct 2018 04:10 )  5.670 ng/mL / x     / 1831 U/L / x     / x            ## Blood Gas  ABG - ( 04 Oct 2018 22:23 )  pH, Arterial: x     pH, Blood: 7.25  /  pCO2: 36    /  pO2: 53    / HCO3: 15    / Base Excess: -10.8 /  SaO2: 79                  #I/Os  I&O's Detail    04 Oct 2018 07:01  -  05 Oct 2018 07:00  --------------------------------------------------------  IN:    amiodarone Infusion: 383.1 mL    fentaNYL Infusion.: 182.4 mL    norepinephrine Infusion: 414.1 mL    Platelets - Single Donor: 250 mL    sodium bicarbonate  Infusion: 1900 mL    Solution: 300 mL    Solution: 200 mL    Solution: 150 mL    Solution: 50 mL    Solution: 200 mL    Solution: 250 mL    vasopressin Infusion: 52.8 mL  Total IN: 4332.4 mL    OUT:    Indwelling Catheter - Urethral: 20 mL  Total OUT: 20 mL    Total NET: 4312.4 mL      05 Oct 2018 07:01  -  05 Oct 2018 11:42  --------------------------------------------------------  IN:    amiodarone Infusion: 33.4 mL    fentaNYL Infusion.: 15.2 mL    norepinephrine Infusion: 43 mL    sodium bicarbonate  Infusion: 200 mL    vasopressin Infusion: 4.8 mL  Total IN: 296.4 mL    OUT:    Indwelling Catheter - Urethral: 10 mL  Total OUT: 10 mL    Total NET: 286.4 mL          ## Imaging:    ## Medications:  MEDICATIONS  (STANDING):  ALBUTerol    0.083% 2.5 milliGRAM(s) Nebulizer every 6 hours  amiodarone    Tablet 200 milliGRAM(s) Oral daily  amiodarone Infusion 0.5 mG/Min (16.667 mL/Hr) IV Continuous <Continuous>  ascorbic acid IVPB 1500 milliGRAM(s) IV Intermittent every 6 hours  cefTRIAXone   IVPB      cefTRIAXone   IVPB 2 Gram(s) IV Intermittent every 12 hours  chlorhexidine 0.12% Liquid 15 milliLiter(s) Swish and Spit two times a day  chlorhexidine 4% Liquid 1 Application(s) Topical <User Schedule>  dextrose 5%. 1000 milliLiter(s) (50 mL/Hr) IV Continuous <Continuous>  dextrose 50% Injectable 12.5 Gram(s) IV Push once  dextrose 50% Injectable 25 Gram(s) IV Push once  dextrose 50% Injectable 25 Gram(s) IV Push once  fentaNYL   Infusion. 0.5 MICROgram(s)/kG/Hr (0.766 mL/Hr) IV Continuous <Continuous>  fluconAZOLE IVPB      fluconAZOLE IVPB 100 milliGRAM(s) IV Intermittent every 24 hours  hydrocortisone sodium succinate Injectable 50 milliGRAM(s) IV Push every 6 hours  insulin lispro (HumaLOG) corrective regimen sliding scale   SubCutaneous every 6 hours  metroNIDAZOLE  IVPB      metroNIDAZOLE  IVPB 500 milliGRAM(s) IV Intermittent every 8 hours  midodrine 10 milliGRAM(s) Oral every 8 hours  norepinephrine Infusion 1 MICROgram(s)/kG/Min (71.813 mL/Hr) IV Continuous <Continuous>  pantoprazole   Suspension 40 milliGRAM(s) Oral daily  propofol Infusion 5 MICROgram(s)/kG/Min (2.298 mL/Hr) IV Continuous <Continuous>  sodium bicarbonate  Infusion 0.196 mEq/kG/Hr (100 mL/Hr) IV Continuous <Continuous>  thiamine IVPB 200 milliGRAM(s) IV Intermittent <User Schedule>  vasopressin Infusion 0.04 Unit(s)/Min (2.4 mL/Hr) IV Continuous <Continuous>    MEDICATIONS  (PRN):  acetaminophen   Tablet .. 650 milliGRAM(s) Oral every 6 hours PRN Temp greater or equal to 38C (100.4F), Mild Pain (1 - 3)  dextrose 40% Gel 15 Gram(s) Oral once PRN Blood Glucose LESS THAN 70 milliGRAM(s)/deciliter  glucagon  Injectable 1 milliGRAM(s) IntraMuscular once PRN Glucose LESS THAN 70 milligrams/deciliter

## 2018-10-05 NOTE — CONSULT NOTE ADULT - ASSESSMENT
72 o b female with HTN 	thrombocytopenia, anemia SP HD yesterday  P.Smear revealed few schistocytes , many poly with toxic granulation , no clumping of platelets few large platelets   Different diagnosis is TTP vs HUS   Low level of ADAMTS can be seen in both condition but more frequently in TTP   Renal failure and thrombocytopenia are seen in both condition .

## 2018-10-05 NOTE — PROGRESS NOTE ADULT - ATTENDING COMMENTS
I examined patient in CCU bed, same critical condition,   Patient overall condition and plan discussed with Dr. Cortes, ICU attending and with RN.  No Surgical intervention at this time.  Continue CCU management.
I examined patient in CCU,  plan discussed with LESA JIMENEZ aware of consult.  Continue ICU management.  Patient critical.  Prognosis poor.  Will F/u BARBARA milan

## 2018-10-05 NOTE — PROGRESS NOTE ADULT - SUBJECTIVE AND OBJECTIVE BOX
TMAX - 101.3    On day # 4 Rocephin / # 2 Diflucan/ # 2 Flagyl    Vital Signs Last 24 Hrs  T(C): 37.6 (05 Oct 2018 12:12), Max: 38.3 (04 Oct 2018 16:45)  T(F): 99.6 (05 Oct 2018 12:12), Max: 100.9 (04 Oct 2018 16:45)  HR: 92 (05 Oct 2018 13:14) (52 - 117)  BP: 131/83 (05 Oct 2018 12:12) (101/50 - 190/95)  BP(mean): 94 (05 Oct 2018 12:12) (51 - 119)  RR: 17 (05 Oct 2018 12:12) (16 - 37)  SpO2: 99% (05 Oct 2018 13:14) (75% - 100%)  Mode: AC/ CMV (Assist Control/ Continuous Mandatory Ventilation)  RR (machine): 35  TV (machine): 290  FiO2: 100  PEEP: 8  ITime: 0.7  MAP: 16  PIP: 30  Supplemental O2:  on 100% FIO2      Remains sedated on ventilator and now on 100% FIO2 + 8 PEEP.  Still requiring pressor support with Levophed + Vasopressin and Amiodarone now changed to via NGT.  s/p HD yesterday and rx'ed with Platelets as well.  Seen by Hematology and patient to begin Plasmapheresis today for ? TTP.  Sensitivities now completed by the Micro lab and the Neisseria Meningitidis isolate from the Blood Cx's is very sensitive to both PCN and Rocephin by E-Test.  Rt. Femoral A-Line d/c'ed this AM.        PHYSICAL EXAM  General:  sedated on ventilator, remains orally intubated and with NGT in place, with marked selling noted throughout  HEENT:  conj pink, sclerae mildly icteric, Lt scleral hemorrhage persists, orally intubated and with NGT in place and hooked to suction presently  Neck:  semi-supple, no nodes noted            Lt IJ CVP in place - site clean with dressing intact - placed 10/2/18  Heart:  Irreg R   Lungs:  few upper airway moist rhonchi with mild wheeze RUL  Abdomen:  BS+, firmly distended now, appears nontender to palpation  Lt Femoral Vas Cath in place - site obscurred with dressing dry and intact - placed 10/4/18  Extremities:  2+ edema throughout                    No palmar or plantar lesions noted  Skin:  warm, dry, no rash noted      I&O's Summary :    04 Oct 2018 07:01  -  05 Oct 2018 07:00  --------------------------------------------------------  IN: 4332.4 mL / OUT: 20 mL / NET: 4312.4 mL    05 Oct 2018 07:01  -  05 Oct 2018 13:26  --------------------------------------------------------  IN: 296.4 mL / OUT: 10 mL / NET: 286.4 mL      LABS:  CBC Full  -  ( 05 Oct 2018 04:15 )  WBC Count : 31.49 K/uL  Hemoglobin : 6.6 g/dL  Hematocrit : 21.3 %  Platelet Count - Automated : 56 K/uL  Mean Cell Volume : 89.5 fl  Mean Cell Hemoglobin : 27.7 pg  Mean Cell Hemoglobin Concentration : 31.0 gm/dL  Auto Neutrophil # : x  Auto Lymphocyte # : x  Auto Monocyte # : x  Auto Eosinophil # : x  Auto Basophil # : x  Auto Neutrophil % : x  Auto Lymphocyte % : x  Auto Monocyte % : x  Auto Eosinophil % : x  Auto Basophil % : x    10-05    142  |  102  |  42<H>  ----------------------------<  226<H>  4.6   |  17<L>  |  4.06<H>    Ca    6.6<L>      05 Oct 2018 04:15  Phos  6.0     10-05  Mg     2.4     10-05    TPro  4.7<L>  /  Alb  2.2<L>  /  TBili  5.7<H>  /  DBili  5.56<H>  /  AST  8267<H>  /  ALT  2787<H>  /  AlkPhos  469<H>  10-05    LIVER FUNCTIONS - ( 05 Oct 2018 04:15 )  Alb: 2.2 g/dL / Pro: 4.7 gm/dL / ALK PHOS: 469 U/L / ALT: 2787 U/L / AST: 8267 U/L / GGT: x             PT/INR - ( 04 Oct 2018 04:10 )   PT: 36.0 sec;   INR: 3.22 ratio       PTT - ( 04 Oct 2018 04:10 )  PTT:37.4 sec    ABG - ( 04 Oct 2018 22:23 )  pH, Arterial: x     pH, Blood: 7.25  /  pCO2: 36    /  pO2: 53    / HCO3: 15    / Base Excess: -10.8 /  SaO2: 79            CARDIAC MARKERS ( 04 Oct 2018 22:23 )  3.310 ng/mL / x     / x     / x     / x        CARDIAC MARKERS ( 04 Oct 2018 13:21 )  4.590 ng/mL / x     / x     / x     / x        CARDIAC MARKERS ( 04 Oct 2018 04:10 )  5.670 ng/mL / x     / 1831 U/L / x     / x          CRP - 31.55 ( 10/5/18 )    Sedimentation Rate, Erythrocyte: 9 mm/hr (10-05 @ 04:15)    Sedimentation Rate, Erythrocyte: 23 mm/hr (10-03 @ 04:10)      Influenza A Rapid Screen: Negative (10-03 @ 04:10)  Influenza B Rapid Screen: Negative (10-03 @ 04:10)  Influenza A Rapid Screen: Negative (09-30 @ 21:20)  Influenza B Rapid Screen: Negative (09-30 @ 21:20)      Hepatitis Panel -Acute Hepatitis Panel (10.04.18 @ 23:55)    Hepatitis C Virus S/CO Ratio: 0.08 S/CO    Hepatitis C Virus Interpretation: Nonreact: Hepatitis C AB  S/CO Ratio                        Interpretation  < 1.0                                     Non-Reactive  1.0 - 4.9                           Weakly-Reactive  > 5.0                                 Reactive  Non-Reactive: A person witha non-reactive HCV antibody result is  considered uninfected.  No further action is needed unless recent  infection is suspected.  In these cases, consider repeat testing later to  detect seroconversion..  Weakly-Reactive: HCV antibody test is abnormal, HCV RNA Qualitative test  will follow.  Reactive: HCV antibody test is abnormal, HCV RNA Qualitative test will  follow.  Note: HCV antibody testing is performed on the Abbott  system.    Hepatitis B Core IgM Antibody: Nonreact    Hepatitis B Surface Antigen: Nonreact    Hepatitis A IgM Antibody: Nonreact    Hepatitis B Surface Ab - Positive      Vancomycin Level, Trough: 12.2 ug/mL (10-04 @ 10:44)    Lipase, Serum: 49 U/L (10-02 @ 22:02)    Amylase, Serum Total: 231 U/L (10-02 @ 22:02)    Lactate - 10.3 ( 10/5/18 )    MICROBIOLOGY:  Specimen Source: .Sputum Sputum (10-03 @ 08:44)  Culture Results:   Normal Respiratory Pam present (10-03 @ 08:44)    Specimen Source: .Urine Clean Catch (Midstream) (10-01 @ 14:43)  Culture Results:   No growth (10-01 @ 14:43)    Specimen Source: .Blood Blood (09-30 @ 22:52)  Culture Results:   Growth in aerobic bottle: Neisseria meningitidis  Isolate sent to  [facility]  for confirmation.  Culture - Blood (09.30.18 @ 22:52)    -  Ceftriaxone: S 0.002    -  Penicillin: S 0.064    Gram Stain:   Growth in aerobic bottle: Gram Negative Coccobacilli     Specimen Source: .Blood Blood    Organism: Blood Culture PCR    Organism: Neisseria meningitidis    Culture Results:   Growth in aerobic bottle: Neisseria meningitidis  Isolate sent to  [facility]  for confirmation.    Organism Identification: Blood Culture PCR  Neisseria meningitidis    Method Type: PCR    Method Type: ETEST      Specimen Source: .Blood Blood (09-30 @ 22:52)  Culture Results:   Growth in aerobic bottle: Neisseria meningitidis "Susceptibilities not  performed"  See previous culture [id number] (09-30 @ 22:52)          Radiology:   CXR - < from: Xray Chest 1 View AP/PA. (10.05.18 @ 07:25) >  FINDINGS /   IMPRESSION:     Shallow inspiration. Patient is status post intubation. Nasogastric tube   is present tip not imaged but below diaphragm. Central catheter tip in   region of SVC. Mild interstitial edema and subsegmental atelectasis. No   sizable pneumothorax. Borderline cardiomegaly.    There are degenerative   changes.         Impression:  Remains febrile although appears to be trending downward now on present ab rx with Rocephin + Diflucan + Flagyl for continued rx of Sepsis with Shock due to Neisseria Meningitidis Bacteremia and possible Meningitis with associated ARF, Respiratory Failure, initial rapid AFib, and ? TTP now.  Noted CRP remains elevated but Lactate slightly lower, but requiring increased FIO2 now.      Suggestions:  Will continue present ab rx and d/c Droplet Precautions now as patient has recieved > 24 hrs. of appropriate ab rx ( Sensitivities now completed by the Micro Lab ).  Continue to follow-up temps and labs closely.  For Plasmapheresis and HD again today.  Condition remains guarded.  Await confirmatory information from the [facility].

## 2018-10-05 NOTE — PROGRESS NOTE ADULT - ASSESSMENT
71 y/o F w/hx of breast cancer, DM on metformin presenting with severe sepsis with septic shock, HERBIE, lactic acidosis, coagulopathy likely DIC secondary to infection vs HUS, hypocalcemia, hypokalemia, and hypomagnesemia. Lactic acidosis and DIC likely secondary to nisseria meningitidis infection. Acute hepatitis likely secondary to probable HLH  (patient also now with thrombocytopenia and anemia, as well as elevated triglycerides). ADAMTS 13 levels low concerning for likely TTP/HUS.    Neuro: Sedation as needed goal RASS -1 to 0  - Fentanyl PRN, propofol    Resp:   - 6 cc/Kg IBW  - Daily SAT/SBT  - Barrier to extubation is septic shock and metabolic acidosis  - Outpatient follow up of pulmonary nodule    CV: Septic shock due to gram negative bacteremia, likely component of cardiogenic shock as well as there is severely reduced LV function on my bedside echo. Failed dobutamine due to arrythmia. Elevated troponin, likely NSTEMI now downtrending. Appreciate cardiology evaluation.  - Levophed, vaso titrate to goal MAP >= 65  - Amio gtt  - Hydrocortisone  - No heparin due to coagulopathy    Renal: HERBIE now worsening again. Likely ATN vs sepsis induced  - Monitor UOP  - Trend Cr, avoid nephrotoxins  - Continue HD    ID: Septic shock, nisseria meningitidis. Unlikely meningitis as patient had no meningitic symptoms and was completely lucid prior to intubation, however will cover empirically for possible meningitis. Risk of LP outweighs benefit at this point as patient is coagulopathic. Would just provide empiric coverage for possible meningitis.   - Appreciate ID consult  - Ceftriaxone, Vanc by level  - Vitamin C protocol  - Continue diflucan, IV flagyl    GI: Acute hepatitis, likely from HLH  - Continue to monitor LFTs  - Repeat abdominal US without significant distension and with minimal wall thickening    Heme: Ferritin now > 3000 concern for likely secondary HLH, fevers, no splenomegally.  ADAMTS 13 low, concern for TTP/HUS  - Heme/Onc consult  - Initiation of plasmapheresis    FEN  - Tube feeds  - Replete lytes as needed    Guarded prognosis    Attending critical care time 55 minutes

## 2018-10-05 NOTE — PROGRESS NOTE ADULT - SUBJECTIVE AND OBJECTIVE BOX
Surgery NP note  Pt. seen and examined at bedside  intubated and sedated in CCU. Febrile, Tmax 100.9  On pressor support    T(F): 98.6 (10-05-18 @ 09:00), Max: 100.9 (10-04-18 @ 16:45)  HR: 64 (10-05-18 @ 10:00) (52 - 117)  BP: 125/51 (10-05-18 @ 10:00) (101/50 - 190/95)  RR: 26 (10-05-18 @ 10:00) (16 - 37)  SpO2: 100% (10-05-18 @ 10:00) (75% - 100%)  Wt(kg): --  CAPILLARY BLOOD GLUCOSE      POCT Blood Glucose.: 334 mg/dL (05 Oct 2018 05:58)  POCT Blood Glucose.: 278 mg/dL (05 Oct 2018 00:23)  POCT Blood Glucose.: 180 mg/dL (04 Oct 2018 18:28)  POCT Blood Glucose.: 252 mg/dL (04 Oct 2018 11:53)  POCT Blood Glucose.: 28 mg/dL (04 Oct 2018 11:46)      PHYSICAL EXAM:  General: NAD.   Neuro:  Sedated  HEENT: NCAT, EOMI, conjunctiva clear  CV: +S1+S2 regular rate and rhythm  Lung: clear to ausculation bilaterally, respirations nonlabored, good inspiratory effort  Abdomen: Obese, non distended, soft, unresponsive, no guarding. No organomegaly.  Extremities: no pedal edema or calf tenderness noted     LABS:                        6.6    31.49 )-----------( 56       ( 05 Oct 2018 04:15 )             21.3     10-05    142  |  102  |  42<H>  ----------------------------<  226<H>  4.6   |  17<L>  |  4.06<H>    Ca    6.6<L>      05 Oct 2018 04:15  Phos  6.0     10-05  Mg     2.4     10-05    TPro  4.7<L>  /  Alb  2.2<L>  /  TBili  5.7<H>  /  DBili  5.56<H>  /  AST  8267<H>  /  ALT  2787<H>  /  AlkPhos  469<H>  10-05    LIVER FUNCTIONS - ( 05 Oct 2018 04:15 )  Alb: 2.2 g/dL / Pro: 4.7 gm/dL / ALK PHOS: 469 U/L / ALT: 2787 U/L / AST: 8267 U/L / GGT: x           PT/INR - ( 04 Oct 2018 04:10 )   PT: 36.0 sec;   INR: 3.22 ratio         PTT - ( 04 Oct 2018 04:10 )  PTT:37.4 sec  I&O's Detail    04 Oct 2018 07:01  -  05 Oct 2018 07:00  --------------------------------------------------------  IN:    amiodarone Infusion: 383.1 mL    fentaNYL Infusion.: 182.4 mL    norepinephrine Infusion: 414.1 mL    Platelets - Single Donor: 250 mL    sodium bicarbonate  Infusion: 1900 mL    Solution: 300 mL    Solution: 200 mL    Solution: 150 mL    Solution: 50 mL    Solution: 200 mL    Solution: 250 mL    vasopressin Infusion: 52.8 mL  Total IN: 4332.4 mL    OUT:    Indwelling Catheter - Urethral: 20 mL  Total OUT: 20 mL    Total NET: 4312.4 mL      05 Oct 2018 07:01  -  05 Oct 2018 10:38  --------------------------------------------------------  IN:    amiodarone Infusion: 33.4 mL    fentaNYL Infusion.: 15.2 mL    norepinephrine Infusion: 43 mL    sodium bicarbonate  Infusion: 200 mL    vasopressin Infusion: 4.8 mL  Total IN: 296.4 mL    OUT:    Indwelling Catheter - Urethral: 10 mL  Total OUT: 10 mL    Total NET: 286.4 mL      Assessment:   72F with PMH Breast Ca, DM2, HTN and unknown PSH , with septic shock with MOSD, lactic acidosis, found to have meningococcal bacteremia. Surgery consulted for elevated Transaminase, and Abd. US finding of gallbladder wall thickening, r/o Acute Acalculous Cholecystitis  Worsening leukocytosis, renal function, and LFTs. Severe Anemia, Lactate slightly better today     Plan:  Continue ICU management, IV ABX    PER IR Bleeding risks are too high to warrant percutaneous intervention  Renal  and Cardiology Consult and F/U  F/u labs  prognosis poor  Discussed with Dr. Salmon and ICU PA Surgery NP note    Pt. seen and examined at bedside  intubated and sedated in CCU. Febrile, Tmax 100.9  On vasopressors support, as per nurse condition same.    T(F): 98.6 (10-05-18 @ 09:00), Max: 100.9 (10-04-18 @ 16:45)  HR: 64 (10-05-18 @ 10:00) (52 - 117)  BP: 125/51 (10-05-18 @ 10:00) (101/50 - 190/95)  RR: 26 (10-05-18 @ 10:00) (16 - 37)  SpO2: 100% (10-05-18 @ 10:00) (75% - 100%)  Wt(kg): --  CAPILLARY BLOOD GLUCOSE      POCT Blood Glucose.: 334 mg/dL (05 Oct 2018 05:58)  POCT Blood Glucose.: 278 mg/dL (05 Oct 2018 00:23)  POCT Blood Glucose.: 180 mg/dL (04 Oct 2018 18:28)  POCT Blood Glucose.: 252 mg/dL (04 Oct 2018 11:53)  POCT Blood Glucose.: 28 mg/dL (04 Oct 2018 11:46)      PHYSICAL EXAM:  General: NAD.   Neuro:  Sedated  HEENT:  Contracted  pupils, Jaundice sclera, edematous and hemorrhagic  conjunctiva.               ETT, NGT.  CV: +S1+S2 regular rate and rhythm, patient on and off A-Fib.  Lung: clear to ausculation bilaterally, Decreased BS at bases.  Abdomen: Obese, non distended, soft, + subcutaneus edema, unable to assess tenderness.  Extremities: ++ pedal edema     LABS:                        6.6    31.49 )-----------( 56       ( 05 Oct 2018 04:15 )             21.3     10-05    142  |  102  |  42<H>  ----------------------------<  226<H>  4.6   |  17<L>  |  4.06<H>    Ca    6.6<L>      05 Oct 2018 04:15  Phos  6.0     10-05  Mg     2.4     10-05    TPro  4.7<L>  /  Alb  2.2<L>  /  TBili  5.7<H>  /  DBili  5.56<H>  /  AST  8267<H>  /  ALT  2787<H>  /  AlkPhos  469<H>  10-05    LIVER FUNCTIONS - ( 05 Oct 2018 04:15 )  Alb: 2.2 g/dL / Pro: 4.7 gm/dL / ALK PHOS: 469 U/L / ALT: 2787 U/L / AST: 8267 U/L / GGT: x           PT/INR - ( 04 Oct 2018 04:10 )   PT: 36.0 sec;   INR: 3.22 ratio         PTT - ( 04 Oct 2018 04:10 )  PTT:37.4 sec  I&O's Detail    04 Oct 2018 07:01  -  05 Oct 2018 07:00  --------------------------------------------------------  IN:    amiodarone Infusion: 383.1 mL    fentaNYL Infusion.: 182.4 mL    norepinephrine Infusion: 414.1 mL    Platelets - Single Donor: 250 mL    sodium bicarbonate  Infusion: 1900 mL    Solution: 300 mL    Solution: 200 mL    Solution: 150 mL    Solution: 50 mL    Solution: 200 mL    Solution: 250 mL    vasopressin Infusion: 52.8 mL  Total IN: 4332.4 mL    OUT:    Indwelling Catheter - Urethral: 20 mL  Total OUT: 20 mL    Total NET: 4312.4 mL      05 Oct 2018 07:01  -  05 Oct 2018 10:38  --------------------------------------------------------  IN:    amiodarone Infusion: 33.4 mL    fentaNYL Infusion.: 15.2 mL    norepinephrine Infusion: 43 mL    sodium bicarbonate  Infusion: 200 mL    vasopressin Infusion: 4.8 mL  Total IN: 296.4 mL    OUT:    Indwelling Catheter - Urethral: 10 mL  Total OUT: 10 mL    Total NET: 286.4 mL      Assessment:   72F with PMH Breast Ca, DM2, HTN and unknown PSH , with septic shock with MOSD, lactic acidosis, found to have meningococcal bacteremia. Surgery consulted for elevated Transaminase, and Abd. US finding of gallbladder wall thickening, r/o Acute Acalculous Cholecystitis  Worsening leukocytosis, renal function, and LFTs. Severe Anemia, Lactic acidosis.  Prognosis poor.     Plan:  Continue ICU management, IV ABX    PER IR Bleeding risks are too high to warrant percutaneous Cholecystostomy tube intervention  Renal  and Cardiology Consult and F/U  F/u labs  Discussed with Dr. Salmon and ICU PA

## 2018-10-05 NOTE — PROGRESS NOTE ADULT - SUBJECTIVE AND OBJECTIVE BOX
Events noted;   For plasmapheresis today;       MEDICATIONS  (STANDING):  ALBUTerol    0.083% 2.5 milliGRAM(s) Nebulizer every 6 hours  amiodarone    Tablet 200 milliGRAM(s) Oral daily  amiodarone Infusion 0.5 mG/Min (16.667 mL/Hr) IV Continuous <Continuous>  ascorbic acid IVPB 1500 milliGRAM(s) IV Intermittent every 6 hours  calcium gluconate IVPB 2 Gram(s) IV Intermittent daily  cefTRIAXone   IVPB      cefTRIAXone   IVPB 2 Gram(s) IV Intermittent every 12 hours  chlorhexidine 0.12% Liquid 15 milliLiter(s) Swish and Spit two times a day  chlorhexidine 4% Liquid 1 Application(s) Topical <User Schedule>  dextrose 5%. 1000 milliLiter(s) (50 mL/Hr) IV Continuous <Continuous>  dextrose 50% Injectable 12.5 Gram(s) IV Push once  dextrose 50% Injectable 25 Gram(s) IV Push once  dextrose 50% Injectable 25 Gram(s) IV Push once  diphenhydrAMINE   Injectable 50 milliGRAM(s) IV Push daily  fentaNYL   Infusion. 0.5 MICROgram(s)/kG/Hr (0.766 mL/Hr) IV Continuous <Continuous>  fluconAZOLE IVPB      fluconAZOLE IVPB 100 milliGRAM(s) IV Intermittent every 24 hours  hydrocortisone sodium succinate Injectable 50 milliGRAM(s) IV Push every 6 hours  insulin lispro (HumaLOG) corrective regimen sliding scale   SubCutaneous every 6 hours  metroNIDAZOLE  IVPB      metroNIDAZOLE  IVPB 500 milliGRAM(s) IV Intermittent every 8 hours  midodrine 10 milliGRAM(s) Oral every 8 hours  norepinephrine Infusion 1 MICROgram(s)/kG/Min (71.813 mL/Hr) IV Continuous <Continuous>  pantoprazole   Suspension 40 milliGRAM(s) Oral daily  propofol Infusion 5 MICROgram(s)/kG/Min (2.298 mL/Hr) IV Continuous <Continuous>  sodium bicarbonate  Infusion 0.196 mEq/kG/Hr (100 mL/Hr) IV Continuous <Continuous>  thiamine IVPB 200 milliGRAM(s) IV Intermittent <User Schedule>  vasopressin Infusion 0.04 Unit(s)/Min (2.4 mL/Hr) IV Continuous <Continuous>    MEDICATIONS  (PRN):  acetaminophen   Tablet .. 650 milliGRAM(s) Oral every 6 hours PRN Temp greater or equal to 38C (100.4F), Mild Pain (1 - 3)  dextrose 40% Gel 15 Gram(s) Oral once PRN Blood Glucose LESS THAN 70 milliGRAM(s)/deciliter  glucagon  Injectable 1 milliGRAM(s) IntraMuscular once PRN Glucose LESS THAN 70 milligrams/deciliter      10-04-18 @ 07:01  -  10-05-18 @ 07:00  --------------------------------------------------------  IN: 4332.4 mL / OUT: 20 mL / NET: 4312.4 mL    10-05-18 @ 07:01  -  10-05-18 @ 16:23  --------------------------------------------------------  IN: 296.4 mL / OUT: 10 mL / NET: 286.4 mL      PHYSICAL EXAM:      T(C): 37.6 (10-05-18 @ 12:12), Max: 38.3 (10-04-18 @ 16:45)  HR: 96 (10-05-18 @ 14:30) (52 - 117)  BP: 140/81 (10-05-18 @ 14:30) (101/50 - 143/93)  RR: 18 (10-05-18 @ 14:30) (16 - 37)  SpO2: 96% (10-05-18 @ 14:30) (75% - 100%)  Wt(kg): --  Respiratory: clear anteriorly, decreased BS at bases  Cardiovascular: S1 S2  Gastrointestinal: soft NT ND +BS  Extremities:  1 edema                                    6.6    31.49 )-----------( 56       ( 05 Oct 2018 04:15 )             21.3     10-05    142  |  102  |  42<H>  ----------------------------<  226<H>  4.6   |  17<L>  |  4.06<H>    Ca    6.6<L>      05 Oct 2018 04:15  Phos  6.0     10-05  Mg     2.4     10-05    TPro  4.7<L>  /  Alb  2.2<L>  /  TBili  5.7<H>  /  DBili  5.56<H>  /  AST  8267<H>  /  ALT  2787<H>  /  AlkPhos  469<H>  10-05    ABG - ( 04 Oct 2018 22:23 )  pH, Arterial: x     pH, Blood: 7.25  /  pCO2: 36    /  pO2: 53    / HCO3: 15    / Base Excess: -10.8 /  SaO2: 79            Culture Results:   Growth in aerobic bottle: Neisseria meningitidis "Susceptibilities not  performed"  See previous culture [id number] (09.30.18 @ 22:52)        LIVER FUNCTIONS - ( 05 Oct 2018 04:15 )  Alb: 2.2 g/dL / Pro: 4.7 gm/dL / ALK PHOS: 469 U/L / ALT: 2787 U/L / AST: 8267 U/L / GGT: x             Assessment and Plan:    HERBIE septic shock, ATN;  lactic acidosis; evolving TMA TTP/ HUS vs DIC;  Neisseria meningitidis bacteremia;   Hemodialysis after PP today; third treatment tomorrow.  Prognosis guarded.

## 2018-10-05 NOTE — CONSULT NOTE ADULT - SUBJECTIVE AND OBJECTIVE BOX
HPI:  Called to see 71 Y/O female w/ PMHx of HTN, DM, breast CA (s/p lumpectomy, radiation/chemo) BIBEMS w/ c/o N/V, malaise, loose stool since night prior to presentation. Upon evaluation in ED, pt was noted to go into REHAN and given 10mg IV cardizem x 1, followed by 30mg PO x 1. Pt was also noted to have an increase in serum lactate from 3.0 to 6.8 after receiving 4L of NS, and becoming hypotensive w/ pressure in the 80's. ICU called for further evaluation. Pt denies recent travel, recent known sick contacts, or change in bowel habits. (01 Oct 2018 04:30)      PAST MEDICAL & SURGICAL HISTORY:  HTN (hypertension)  DM (diabetes mellitus)  Breast cancer  H/O lumpectomy      REVIEW OF SYSTEMS: patient cannot give any history since she is intubated,   SP platelet transfusion for CVP line insertion SP  PRBCs transfusion  MEDICATIONS  (STANDING):  ALBUTerol    0.083% 2.5 milliGRAM(s) Nebulizer every 6 hours  amiodarone    Tablet 200 milliGRAM(s) Oral daily  amiodarone Infusion 0.5 mG/Min (16.667 mL/Hr) IV Continuous <Continuous>  ascorbic acid IVPB 1500 milliGRAM(s) IV Intermittent every 6 hours  cefTRIAXone   IVPB      cefTRIAXone   IVPB 2 Gram(s) IV Intermittent every 12 hours  chlorhexidine 0.12% Liquid 15 milliLiter(s) Swish and Spit two times a day  chlorhexidine 4% Liquid 1 Application(s) Topical <User Schedule>  dextrose 5%. 1000 milliLiter(s) (50 mL/Hr) IV Continuous <Continuous>  dextrose 50% Injectable 12.5 Gram(s) IV Push once  dextrose 50% Injectable 25 Gram(s) IV Push once  dextrose 50% Injectable 25 Gram(s) IV Push once  fentaNYL   Infusion. 0.5 MICROgram(s)/kG/Hr (0.766 mL/Hr) IV Continuous <Continuous>  fluconAZOLE IVPB      fluconAZOLE IVPB 100 milliGRAM(s) IV Intermittent every 24 hours  hydrocortisone sodium succinate Injectable 50 milliGRAM(s) IV Push every 6 hours  insulin lispro (HumaLOG) corrective regimen sliding scale   SubCutaneous every 6 hours  metroNIDAZOLE  IVPB      metroNIDAZOLE  IVPB 500 milliGRAM(s) IV Intermittent every 8 hours  midodrine 10 milliGRAM(s) Oral every 8 hours  norepinephrine Infusion 1 MICROgram(s)/kG/Min (71.813 mL/Hr) IV Continuous <Continuous>  pantoprazole   Suspension 40 milliGRAM(s) Oral daily  propofol Infusion 5 MICROgram(s)/kG/Min (2.298 mL/Hr) IV Continuous <Continuous>  sodium bicarbonate  Infusion 0.196 mEq/kG/Hr (100 mL/Hr) IV Continuous <Continuous>  thiamine IVPB 200 milliGRAM(s) IV Intermittent <User Schedule>  vasopressin Infusion 0.04 Unit(s)/Min (2.4 mL/Hr) IV Continuous <Continuous>    MEDICATIONS  (PRN):  acetaminophen   Tablet .. 650 milliGRAM(s) Oral every 6 hours PRN Temp greater or equal to 38C (100.4F), Mild Pain (1 - 3)  dextrose 40% Gel 15 Gram(s) Oral once PRN Blood Glucose LESS THAN 70 milliGRAM(s)/deciliter  glucagon  Injectable 1 milliGRAM(s) IntraMuscular once PRN Glucose LESS THAN 70 milligrams/deciliter      Allergies    No Known Allergies    Intolerances        SOCIAL HISTORY:    FAMILY HISTORY:      Vital Signs Last 24 Hrs  T(C): 37 (05 Oct 2018 09:00), Max: 38.3 (04 Oct 2018 16:45)  T(F): 98.6 (05 Oct 2018 09:00), Max: 100.9 (04 Oct 2018 16:45)  HR: 90 (05 Oct 2018 09:15) (66 - 117)  BP: 115/64 (05 Oct 2018 09:15) (101/50 - 190/95)  BP(mean): 76 (05 Oct 2018 09:15) (51 - 119)  RR: 37 (05 Oct 2018 09:15) (16 - 37)  SpO2: 98% (05 Oct 2018 09:15) (75% - 100%)    PHYSICAL EXAM: no spontaneous bleeding     GENERAL: NAD, well-developed intubated   HEAD:  Atraumatic, Normocephalic  EYES: pale , conjunctiva and sclera clear  NECK: Supple, No JVD  BREAST:   CHEST/LUNG: Clear   HEART: Regular rate and rhythm;   ABDOMEN: Soft, Nontender, distended; Bowel sounds present  EXTREMITIES:  2+ Peripheral Pulses, No clubbing, cyanosis, or edema  NEUROLOGY: sedated  SKIN: No rashes or lesions    LABS:                        6.6    31.49 )-----------( 56       ( 05 Oct 2018 04:15 )             21.3     10-05    142  |  102  |  42<H>  ----------------------------<  226<H>  4.6   |  17<L>  |  4.06<H>    Ca    6.6<L>      05 Oct 2018 04:15  Phos  6.0     10-05  Mg     2.4     10-05    TPro  4.7<L>  /  Alb  2.2<L>  /  TBili  5.7<H>  /  DBili  5.56<H>  /  AST  8267<H>  /  ALT  2787<H>  /  AlkPhos  469<H>  10-05    PT/INR - ( 04 Oct 2018 04:10 )   PT: 36.0 sec;   INR: 3.22 ratio         PTT - ( 04 Oct 2018 04:10 )  PTT:37.4 sec      RADIOLOGY & ADDITIONAL STUDIES:

## 2018-10-05 NOTE — CONSULT NOTE ADULT - PROBLEM SELECTOR RECOMMENDATION 9
multifactorial   Secondary to TTP /HUS   secondary to sepsis   Plasmapheresis is to be  considered   Avoid medications that may drop platelet count    Continue with IV antibiotics    Monitor platelet count .

## 2018-10-06 NOTE — PROGRESS NOTE ADULT - ASSESSMENT
72 y o b female with leukemoid reaction , low Adamts level ,few  schistocytes in peripheral smear, respiration failure   SP plasmapheresis on 10/05/

## 2018-10-06 NOTE — PROGRESS NOTE ADULT - SUBJECTIVE AND OBJECTIVE BOX
CC: Patient is a 72y old  Female who presents with a chief complaint of fever, malaise (06 Oct 2018 16:42)      ## HPI:HPI:  Called to see 71 Y/O female w/ PMHx of HTN, DM, breast CA (s/p lumpectomy, radiation/chemo) BIBEMS w/ c/o N/V, malaise, loose stool since night prior to presentation. Upon evaluation in ED, pt was noted to go into REHAN and given 10mg IV cardizem x 1, followed by 30mg PO x 1. Pt was also noted to have an increase in serum lactate from 3.0 to 6.8 after receiving 4L of NS, and becoming hypotensive w/ pressure in the 80's. ICU called for further evaluation. Pt denies recent travel, recent known sick contacts, or change in bowel habits. (01 Oct 2018 04:30)      O/N: patient became more hypotensive during plasmapharesis. plasmapharesis aborted.  levo increased. bedside echo showed right heart strain ekg showed possible stemi. concern for pe vs acs. cards contacted. patient too unstable to transfer. heparin drip started    ## ROS:  unable to assess ros due to mental status   ## EXAM  ICU Vital Signs Last 24 Hrs  T(C): 39 (06 Oct 2018 12:43), Max: 39 (06 Oct 2018 12:43)  T(F): 102.2 (06 Oct 2018 12:43), Max: 102.2 (06 Oct 2018 12:43)  HR: 70 (06 Oct 2018 16:43) (53 - 142)  BP: 115/16 (06 Oct 2018 16:00) (74/19 - 160/62)  BP(mean): 38 (06 Oct 2018 16:00) (22 - 89)  ABP: --  ABP(mean): --  RR: 35 (06 Oct 2018 16:00) (17 - 38)  SpO2: 93% (06 Oct 2018 16:43) (76% - 100%)    CON : NAD  EENT : EOMI, MMM  NECK : Full ROM  RESP : CTAB no increased WOB  CARD : rrr no m/r/g  ABD : S NT distended NABS no rebound  EXT : edematous in all extremeties  NEURO : AAOX0  ## Labs:  Lab Results:  CBC  CBC Full  -  ( 06 Oct 2018 04:50 )  WBC Count : 45.97 K/uL  Hemoglobin : 7.5 g/dL  Hematocrit : 23.6 %  Platelet Count - Automated : 52 K/uL  Mean Cell Volume : 90.8 fl  Mean Cell Hemoglobin : 28.8 pg  Mean Cell Hemoglobin Concentration : 31.8 gm/dL  Auto Neutrophil # : x  Auto Lymphocyte # : x  Auto Monocyte # : x  Auto Eosinophil # : x  Auto Basophil # : x  Auto Neutrophil % : x  Auto Lymphocyte % : x  Auto Monocyte % : x  Auto Eosinophil % : x  Auto Basophil % : x    .		Differential:	[] Automated		[] Manual  Chemistry                        7.5    45.97 )-----------( 52       ( 06 Oct 2018 04:50 )             23.6     10-06    139  |  100  |  35<H>  ----------------------------<  134<H>  5.3   |  14<L>  |  3.73<H>    Ca    6.8<L>      06 Oct 2018 04:50  Phos  6.0     10-05  Mg     2.2     10-06    TPro  4.9<L>  /  Alb  2.4<L>  /  TBili  7.6<H>  /  DBili  6.61<H>  /  AST  2244<H>  /  ALT  906<H>  /  AlkPhos  403<H>  10-06    LIVER FUNCTIONS - ( 06 Oct 2018 04:50 )  Alb: 2.4 g/dL / Pro: 4.9 gm/dL / ALK PHOS: 403 U/L / ALT: 906 U/L / AST: 2244 U/L / GGT: x           PT/INR - ( 06 Oct 2018 04:50 )   PT: 19.9 sec;   INR: 1.80 ratio         PTT - ( 06 Oct 2018 04:50 )  PTT:24.9 sec      ABG - ( 04 Oct 2018 22:23 )  pH, Arterial: x     pH, Blood: 7.25  /  pCO2: 36    /  pO2: 53    / HCO3: 15    / Base Excess: -10.8 /  SaO2: 79          Lactate, Blood: 14.3 mmol/L <HH> (10-06-18 @ 04:50)      Lactate, Blood: 14.3 mmol/L (10-06-18 @ 04:50)      MICROBIOLOGY/CULTURES:  Culture Results:   Normal Respiratory Pam present (10-03 @ 08:44)  Culture Results:   No growth (10-01 @ 14:43)  Culture Results:   Growth in aerobic bottle: Neisseria meningitidis non-groupable.  Sergrouping performed by [facility]  [address], [place] [address]  "Due to technical problems, Proteus sp. will Not be reported as part of  the BCID panel until further notice"  ***Blood Panel PCR results on this specimen are available  approximately 3 hours after the Gram stain result.***  Gram stain, PCR, and/or culture results may not always  correspond due to difference in methodologies.  ************************************************************  This PCR assay was performed using [employer].  The following targets are tested for: Enterococcus,  vancomycin resistant enterococci, Listeria monocytogenes,  coagulase negativestaphylococci, S. aureus,  methicillin resistant S. aureus, Streptococcus agalactiae  (Group B), S. pneumoniae, S. pyogenes (Group A),  Acinetobacter baumannii, Enterobacter cloacae, E. coli,  Klebsiella oxytoca, K. pneumoniae, Proteus sp.,  Serratiamarcescens, Haemophilus influenzae,  Neisseria meningitidis, Pseudomonas aeruginosa, Candida  albicans, C. glabrata, C krusei, C parapsilosis,  C. tropicalis and the KPC resistance gene. (09-30 @ 22:52)  Culture Results:   Growth in aerobic bottle: Neisseria meningitidis "Susceptibilities not  performed"  See previous culture [id number] (09-30 @ 22:52)      RADIOLOGY RESULTS:  < from: Xray Chest 1 View- PORTABLE-Routine (10.06.18 @ 08:30) >  FINDINGS/  IMPRESSION:  Tip of the endotracheal tube above the nirali satisfactory position.   Enteric tube courses through the stomach, the tip is beyond the lower   margin of the image. Left IJ central venous catheter tip within SVC.    Increased right lung opacity and moderateright pleural effusions may   represent pneumonia or increased pulmonary edema.    Increased small left basilar pleural effusion and left basilar opacity   which may represent atelectasis or pneumonia.    No pneumothorax.    The cardiomediastinal silhouette is normal.    < end of copied text >        ## Medications:  MEDICATIONS  (STANDING):  ALBUTerol    0.083% 2.5 milliGRAM(s) Nebulizer every 6 hours  amiodarone Infusion 1 mG/Min (33.333 mL/Hr) IV Continuous <Continuous>  amiodarone Infusion 0.5 mG/Min (16.667 mL/Hr) IV Continuous <Continuous>  calcium gluconate IVPB 2 Gram(s) IV Intermittent daily  cefTRIAXone   IVPB      cefTRIAXone   IVPB 2 Gram(s) IV Intermittent every 12 hours  chlorhexidine 0.12% Liquid 15 milliLiter(s) Swish and Spit two times a day  chlorhexidine 4% Liquid 1 Application(s) Topical <User Schedule>  dextrose 5%. 1000 milliLiter(s) (50 mL/Hr) IV Continuous <Continuous>  dextrose 50% Injectable 12.5 Gram(s) IV Push once  dextrose 50% Injectable 25 Gram(s) IV Push once  dextrose 50% Injectable 25 Gram(s) IV Push once  diphenhydrAMINE   Injectable 50 milliGRAM(s) IV Push daily  fentaNYL   Infusion. 0.5 MICROgram(s)/kG/Hr (0.766 mL/Hr) IV Continuous <Continuous>  fluconAZOLE IVPB      fluconAZOLE IVPB 100 milliGRAM(s) IV Intermittent every 24 hours  heparin  Infusion.  Unit(s)/Hr (14 mL/Hr) IV Continuous <Continuous>  hydrocortisone sodium succinate Injectable 50 milliGRAM(s) IV Push every 6 hours  insulin lispro (HumaLOG) corrective regimen sliding scale   SubCutaneous every 6 hours  metroNIDAZOLE  IVPB      metroNIDAZOLE  IVPB 500 milliGRAM(s) IV Intermittent every 8 hours  midodrine 10 milliGRAM(s) Oral every 8 hours  norepinephrine Infusion 1 MICROgram(s)/kG/Min (71.813 mL/Hr) IV Continuous <Continuous>  pantoprazole   Suspension 40 milliGRAM(s) Oral daily  phytonadione   Solution 5 milliGRAM(s) Oral daily  propofol Infusion 5 MICROgram(s)/kG/Min (2.298 mL/Hr) IV Continuous <Continuous>  vasopressin Infusion 0.04 Unit(s)/Min (2.4 mL/Hr) IV Continuous <Continuous>    ## O/E:I&O's Summary    05 Oct 2018 07:01  -  06 Oct 2018 07:00  --------------------------------------------------------  IN: 2371.7 mL / OUT: 40 mL / NET: 2331.7 mL    06 Oct 2018 07:01  -  06 Oct 2018 17:17  --------------------------------------------------------  IN: 717.7 mL / OUT: 10 mL / NET: 707.7 mL        ## Daily Issues  - Analgesia: N/A  - Sedation: propofol  - HOB elevation: Y  - GI ppx (ulcers): N/A  - DVT ppx: Hep drip  - Nutrition: PO diet  - Central line: Y  - Serna: N      ## Code status:  Goals of care discussion: [x] yes [ ] no  [x] full code  [ ] DNR  [ ] DNI  [ ] ESTELA

## 2018-10-06 NOTE — PROGRESS NOTE ADULT - ASSESSMENT
72 year old female in shock likely 2/2 n. meningitidis bacteremia 72 year old female in shock likely 2/2 n. meningitidis bacteremia. throughout days patient became more and more hypotensive.  plasmapharesis attempted but aborted 2/2 arrythmia and hypotension.  bedside echo at that time showed dilated RV and mcconnels sign. concern for pe vs acs.  heparin started.  labs sent.  cards contacted, but patient too unstable for transfer for cath. amiodarone started. In process of given TPA for possible pe vs stemi after discussion with family, patient suffered cardiac arrest. acsl started. tpa given. 9 rounds epi, calcium bicarb mag, d50 given.  patient intermittently regained pulses but was pronounced dead at 1838 after discussion with family at bedside.      ccm 85 min

## 2018-10-06 NOTE — PHARMACY COMMUNICATION NOTE - COMMENTS
verified with robinson 10/2/18, due to pt INR of 2.48, robinson wants 10mg oral vit k bc pt needs a lumbar puncture, she verified pt does not have a current bleed, but wants to use vit k to prevent a bleed from LP
verified with robinson that pt received 15mg IV bolus over 1-2 minutes followed by 50mg over 30 minutes, during which she passed away. indication was STEMI
spoke with albaro to confirm the dose and duration (4 days) for the calcium gluconate IVPB to end on 10/9

## 2018-10-06 NOTE — PROGRESS NOTE ADULT - SUBJECTIVE AND OBJECTIVE BOX
Subjective: responds to deep noxious stimuli. FiO2 100%. HD cut short last night due to rapid a.fib. Currently back in NSR      MEDICATIONS  (STANDING):  ALBUTerol    0.083% 2.5 milliGRAM(s) Nebulizer every 6 hours  amiodarone Infusion 0.5 mG/Min (16.667 mL/Hr) IV Continuous <Continuous>  amiodarone Infusion 1 mG/Min (33.333 mL/Hr) IV Continuous <Continuous>  amiodarone Infusion 0.5 mG/Min (16.667 mL/Hr) IV Continuous <Continuous>  calcium gluconate IVPB 2 Gram(s) IV Intermittent daily  cefTRIAXone   IVPB      cefTRIAXone   IVPB 2 Gram(s) IV Intermittent every 12 hours  chlorhexidine 0.12% Liquid 15 milliLiter(s) Swish and Spit two times a day  chlorhexidine 4% Liquid 1 Application(s) Topical <User Schedule>  dextrose 5%. 1000 milliLiter(s) (50 mL/Hr) IV Continuous <Continuous>  dextrose 50% Injectable 12.5 Gram(s) IV Push once  dextrose 50% Injectable 25 Gram(s) IV Push once  dextrose 50% Injectable 25 Gram(s) IV Push once  diphenhydrAMINE   Injectable 50 milliGRAM(s) IV Push daily  fentaNYL   Infusion. 0.5 MICROgram(s)/kG/Hr (0.766 mL/Hr) IV Continuous <Continuous>  fluconAZOLE IVPB      fluconAZOLE IVPB 100 milliGRAM(s) IV Intermittent every 24 hours  hydrocortisone sodium succinate Injectable 50 milliGRAM(s) IV Push every 6 hours  insulin lispro (HumaLOG) corrective regimen sliding scale   SubCutaneous every 6 hours  metroNIDAZOLE  IVPB      metroNIDAZOLE  IVPB 500 milliGRAM(s) IV Intermittent every 8 hours  midodrine 10 milliGRAM(s) Oral every 8 hours  norepinephrine Infusion 1 MICROgram(s)/kG/Min (71.813 mL/Hr) IV Continuous <Continuous>  pantoprazole   Suspension 40 milliGRAM(s) Oral daily  propofol Infusion 5 MICROgram(s)/kG/Min (2.298 mL/Hr) IV Continuous <Continuous>  sodium bicarbonate  Infusion 0.196 mEq/kG/Hr (100 mL/Hr) IV Continuous <Continuous>  vasopressin Infusion 0.04 Unit(s)/Min (2.4 mL/Hr) IV Continuous <Continuous>    MEDICATIONS  (PRN):  acetaminophen   Tablet .. 650 milliGRAM(s) Oral every 6 hours PRN Temp greater or equal to 38C (100.4F), Mild Pain (1 - 3)  dextrose 40% Gel 15 Gram(s) Oral once PRN Blood Glucose LESS THAN 70 milliGRAM(s)/deciliter  glucagon  Injectable 1 milliGRAM(s) IntraMuscular once PRN Glucose LESS THAN 70 milligrams/deciliter          T(C): 37.8 (10-06-18 @ 03:30), Max: 38.2 (10-05-18 @ 19:22)  HR: 83 (10-06-18 @ 06:00) (52 - 142)  BP: 140/54 (10-06-18 @ 06:00) (80/56 - 149/86)  RR: 21 (10-06-18 @ 06:00) (18 - 38)  SpO2: 100% (10-06-18 @ 06:00) (76% - 100%)  Wt(kg): --    ABG - ( 04 Oct 2018 22:23 )  pH, Arterial: x     pH, Blood: 7.25  /  pCO2: 36    /  pO2: 53    / HCO3: 15    / Base Excess: -10.8 /  SaO2: 79                  I&O's Detail    05 Oct 2018 07:01  -  06 Oct 2018 07:00  --------------------------------------------------------  IN:    amiodarone Infusion: 66.8 mL    fentaNYL Infusion.: 108.2 mL    norepinephrine Infusion: 394.5 mL    propofol Infusion: 23 mL    sodium bicarbonate  Infusion: 400 mL    Solution: 100 mL    Solution: 300 mL    Solution: 200 mL    Solution: 200 mL    Solution: 150 mL    vasopressin Infusion: 45.6 mL  Total IN: 1988.1 mL    OUT:    Indwelling Catheter - Urethral: 40 mL  Total OUT: 40 mL    Total NET: 1948.1 mL          Mode: AC/ CMV (Assist Control/ Continuous Mandatory Ventilation)  RR (machine): 35  TV (machine): 290  FiO2: 100  PEEP: 8  ITime: 1  MAP: 15  PIP: 36       PHYSICAL EXAM:    GENERAL: Vented/FiO2 100%  EYES: EOMI, PERRLA, conjunctiva and sclera clear  NECK: Supple, no inc in JVP  CHEST/LUNG: occ crackles  HEART: S1S2  ABDOMEN: Soft, Nontender, Nondistended; Bowel sounds present  EXTREMITIES:  trace edema  NEURO: sedated  ACCESS: L fem non-tunneled catheter        LABS:  CBC Full  -  ( 06 Oct 2018 04:50 )  WBC Count : 45.97 K/uL  Hemoglobin : 7.5 g/dL  Hematocrit : 23.6 %  Platelet Count - Automated : 52 K/uL  Mean Cell Volume : 90.8 fl  Mean Cell Hemoglobin : 28.8 pg  Mean Cell Hemoglobin Concentration : 31.8 gm/dL  Auto Neutrophil # : x  Auto Lymphocyte # : x  Auto Monocyte # : x  Auto Eosinophil # : x  Auto Basophil # : x  Auto Neutrophil % : x  Auto Lymphocyte % : x  Auto Monocyte % : x  Auto Eosinophil % : x  Auto Basophil % : x    10-06    139  |  100  |  35<H>  ----------------------------<  134<H>  5.3   |  14<L>  |  3.73<H>    Ca    6.8<L>      06 Oct 2018 04:50  Phos  6.0     10-05  Mg     2.2     10-06    TPro  4.9<L>  /  Alb  2.4<L>  /  TBili  7.6<H>  /  DBili  6.61<H>  /  AST  2244<H>  /  ALT  906<H>  /  AlkPhos  403<H>  10-06    PT/INR - ( 06 Oct 2018 04:50 )   PT: 19.9 sec;   INR: 1.80 ratio         PTT - ( 06 Oct 2018 04:50 )  PTT:24.9 sec    Culture Results:   Normal Respiratory Pam present (10-03 @ 08:44)        Impression:  * HERBIE -- ischemic ATN. Anuric  * Septic shock  * Acidosis -- lactic, HERBIE. Intractable  * Emerging hyperK  * Neisseria bacteremia   * NSTEMI  * Lung nodule, vertebral lesions suspicious for metastatic dz    Recommendations;   * HD #3 today. No UF  * Monitor for dysrhythmia. Amio PRN

## 2018-10-06 NOTE — PROGRESS NOTE ADULT - SUBJECTIVE AND OBJECTIVE BOX
TMAX - 102.2    On day # 5 Rocephin/ # 3 Diflucan / # 3 Flagyl now    Vital Signs Last 24 Hrs  T(C): 39 (06 Oct 2018 12:43), Max: 39 (06 Oct 2018 12:43)  T(F): 102.2 (06 Oct 2018 12:43), Max: 102.2 (06 Oct 2018 12:43)  HR: 71 (06 Oct 2018 16:00) (53 - 142)  BP: 115/16 (06 Oct 2018 16:00) (74/19 - 160/62)  BP(mean): 38 (06 Oct 2018 16:00) (22 - 89)  RR: 35 (06 Oct 2018 16:00) (17 - 38)  SpO2: 82% (06 Oct 2018 15:00) (76% - 100%)  Mode: AC/ CMV (Assist Control/ Continuous Mandatory Ventilation)  RR (machine): 35  TV (machine): 290  FiO2: 100  PEEP: 8  ITime: 0.7  MAP: 14  PIP: 23  Supplemental O2:  on ventilator      Remains sedated on ventilator and still requiring 100% FIO2 + 8 PEEP.  S/p HD and Plasmapheresis yesterday, but became unstable earlier today and Plasmapheresis was unable to be done.  Still remains on pressors as well for BP support.      PHYSICAL EXAM  General:  sedated on ventilator, remains orally intubated and with NGT in place, with marked edema throughout  HEENT:  conj pink, sclerae mildly icteric, Lt scleral hemorrhage still noted but appears to be resorbing, orally intubated and with NGT in place  Neck:  semi-supple, no nodes noted            Lt IJ CVP in place - site clean and dressing in place and dry - placed 10/2/18  Heart:  Irreg R  Lungs:  decreased BS at bases bilaterally, but no wheezing noted now  Abdomen:  BS+, semi-firm, appears somewhat distended, appears nontender to palpation  Lt. Femoral Vas Cath in place - site obscurred and dressing dry and intact - placed 10/4/18  Extremities:  2-3+ edema of both UE's and LE's now                     no palmar or plantar lesions noted                     Rt upper thigh noted with a few bullous lesions now  Skin:  warm, dry, no rash noted  Serna catheter remains in place with small amount of trudi-colored urine in the bag        I&O's Summary :    05 Oct 2018 07:01  -  06 Oct 2018 07:00  --------------------------------------------------------  IN: 2371.7 mL / OUT: 40 mL / NET: 2331.7 mL    06 Oct 2018 07:01  -  06 Oct 2018 16:42  --------------------------------------------------------  IN: 717.7 mL / OUT: 10 mL / NET: 707.7 mL      LABS:  CBC Full  -  ( 06 Oct 2018 04:50 )  WBC Count : 45.97 K/uL  Hemoglobin : 7.5 g/dL  Hematocrit : 23.6 %  Platelet Count - Automated : 52 K/uL  Mean Cell Volume : 90.8 fl  Mean Cell Hemoglobin : 28.8 pg  Mean Cell Hemoglobin Concentration : 31.8 gm/dL  Auto Neutrophil # : x  Auto Lymphocyte # : x  Auto Monocyte # : x  Auto Eosinophil # : x  Auto Basophil # : x  Auto Neutrophil % : x  Auto Lymphocyte % : x  Auto Monocyte % : x  Auto Eosinophil % : x  Auto Basophil % : x    10-06    139  |  100  |  35<H>  ----------------------------<  134<H>  5.3   |  14<L>  |  3.73<H>    Ca    6.8<L>      06 Oct 2018 04:50  Phos  6.0     10-05  Mg     2.2     10-06    TPro  4.9<L>  /  Alb  2.4<L>  /  TBili  7.6<H>  /  DBili  6.61<H>  /  AST  2244<H>  /  ALT  906<H>  /  AlkPhos  403<H>  10-06    LIVER FUNCTIONS - ( 06 Oct 2018 04:50 )  Alb: 2.4 g/dL / Pro: 4.9 gm/dL / ALK PHOS: 403 U/L / ALT: 906 U/L / AST: 2244 U/L / GGT: x             PT/INR - ( 06 Oct 2018 04:50 )   PT: 19.9 sec;   INR: 1.80 ratio       PTT - ( 06 Oct 2018 04:50 )  PTT:24.9 sec    ABG - ( 04 Oct 2018 22:23 )  pH, Arterial: x     pH, Blood: 7.25  /  pCO2: 36    /  pO2: 53    / HCO3: 15    / Base Excess: -10.8 /  SaO2: 79          CARDIAC MARKERS ( 04 Oct 2018 22:23 )  3.310 ng/mL / x     / x     / x     / x          Sedimentation Rate, Erythrocyte: 9 mm/hr (10-05 @ 04:15)    Sedimentation Rate, Erythrocyte: 23 mm/hr (10-03 @ 04:10)      Influenza A Rapid Screen: Negative (10-03 @ 04:10)  Influenza B Rapid Screen: Negative (10-03 @ 04:10)      Vancomycin Level, Trough: 12.2 ug/mL (10-04 @ 10:44)      Lipase, Serum: 49 U/L (10-02 @ 22:02)    Amylase, Serum Total: 231 U/L (10-02 @ 22:02)    Lactate - 14.3        MICROBIOLOGY:  Specimen Source: .Sputum Sputum (10-03 @ 08:44)  Gram Stain:   Numerous polymorphonuclear leukocytes  Rare Squamous epithelial cells per low power field  Numerous Yeast per oil power field (10.03.18 @ 08:44)  Culture Results:   Normal Respiratory Pam present (10-03 @ 08:44)      Specimen Source: .Urine Clean Catch (Midstream) (10-01 @ 14:43)  Culture Results:   No growth (10-01 @ 14:43)    Specimen Source: .Blood Blood (09-30 @ 22:52)  Culture Results:   Growth in aerobic bottle: Neisseria meningitidis non-groupable.  Sergrouping performed by [facility]  [address] [place] [id number]  Culture - Blood (09.30.18 @ 22:52)    -  Ceftriaxone: S 0.002    -  Penicillin: S 0.064    Gram Stain:   Growth in aerobic bottle: Gram Negative Coccobacilli    Specimen Source: .Blood Blood    Organism: Blood Culture PCR    Organism: Neisseria meningitidis    Culture Results:   Growth in aerobic bottle: Neisseria meningitidis non-groupable.  Sergrouping performed by [facility]  [address] [place] [id number]    Organism Identification: Blood Culture PCR  Neisseria meningitidis     Method Type: ETEST      Specimen Source: .Blood Blood (09-30 @ 22:52)  Culture Results:   Growth in aerobic bottle: Neisseria meningitidis "Susceptibilities not  performed"  See previous culture [id number] (09-30 @ 22:52)          Radiology:   CXR - < from: Xray Chest 1 View- PORTABLE-Routine (10.06.18 @ 08:30) >  TECHNIQUE: AP view.     COMPARISON: 10/5/2018    FINDINGS/  IMPRESSION:  Tip of the endotracheal tube above the nirali satisfactory position.   Enteric tube courses through the stomach, the tip is beyond the lower   margin of the image. Left IJ central venous catheter tip within SVC.    Increased right lung opacity and moderateright pleural effusions may   represent pneumonia or increased pulmonary edema.    Increased small left basilar pleural effusion and left basilar opacity   which may represent atelectasis or pneumonia.    No pneumothorax.    The cardiomediastinal silhouette is normal.          Impression:  Intermittent temps continue on  present ab rx with Rocephin + Diflucan + Flagyl for continued rx of Sepsis with Shock, Respiratory Failure, and HERBIE due to Neisseria Meningitidis Bacteremia and ? Meningitis, and with associated TTP and severe Leukemoid Rxn.  Noted PT, INR, and PTT improving but Platelets remain low.  Noted that the Meningococcal Isolate was determined to be non-groupable at the [facility].      Suggestions:  Will continue present ab rx and continue to follow-up temps and labs closely.  Continue Plasmapheresis and HD if patient is hemodynamically stable enough as well as consider changing the site of the Lt Femoral vas Cath as soon as this is feasible.  Condition remains guarded.

## 2018-10-06 NOTE — DISCHARGE NOTE FOR THE EXPIRED PATIENT - HOSPITAL COURSE
71 y/o F w/hx of breast cancer, DM on metformin presenting with severe sepsis with septic shock, HERBIE, lactic acidosis, coagulopathy likely DIC secondary to infection vs HUS,  and DIC likely secondary to nisseria meningitidis infection. Acute hepatitis likely secondary to probable HLH  (patient also with thrombocytopenia, anemia, and elevated triglycerides). ADAMTS 13 levels low concerning for likely TTP/HUS.  Pt also having NSTEMI but unable to medically treat due to low platelets, severe hypotension and shock liver.  Given severe lactic acidosis, pt started on HD.  Pt also started on plasmaphoresis for concerns of TTP.  Pt only able to tolerate one session of plasmaphoresis.  Today pt developed wide complex arrhythmia with hypotension. Pt too unstable for transfer.  Heparin and amiodarone gtt initiated.  Pt later lost pulse and ACLS protocol carried out.  Bedside POCUS done and revealed enlarged RV.  With concern for STEMI vs acute PE, the decision was made with family to administer tPA.  After prolonged CPR, pt was pronounced dead at 1838.

## 2018-10-06 NOTE — PROGRESS NOTE ADULT - SUBJECTIVE AND OBJECTIVE BOX
[id number]    Patient is a 72y old  Female who presents with a chief complaint of fever, malaise (06 Oct 2018 07:37)      MEDICATIONS  (STANDING):  ALBUTerol    0.083% 2.5 milliGRAM(s) Nebulizer every 6 hours  calcium gluconate IVPB 2 Gram(s) IV Intermittent daily  cefTRIAXone   IVPB      cefTRIAXone   IVPB 2 Gram(s) IV Intermittent every 12 hours  chlorhexidine 0.12% Liquid 15 milliLiter(s) Swish and Spit two times a day  chlorhexidine 4% Liquid 1 Application(s) Topical <User Schedule>  dextrose 5%. 1000 milliLiter(s) (50 mL/Hr) IV Continuous <Continuous>  dextrose 50% Injectable 12.5 Gram(s) IV Push once  dextrose 50% Injectable 25 Gram(s) IV Push once  dextrose 50% Injectable 25 Gram(s) IV Push once  diphenhydrAMINE   Injectable 50 milliGRAM(s) IV Push daily  fentaNYL   Infusion. 0.5 MICROgram(s)/kG/Hr (0.766 mL/Hr) IV Continuous <Continuous>  fluconAZOLE IVPB      fluconAZOLE IVPB 100 milliGRAM(s) IV Intermittent every 24 hours  hydrocortisone sodium succinate Injectable 50 milliGRAM(s) IV Push every 6 hours  insulin lispro (HumaLOG) corrective regimen sliding scale   SubCutaneous every 6 hours  metroNIDAZOLE  IVPB      metroNIDAZOLE  IVPB 500 milliGRAM(s) IV Intermittent every 8 hours  midodrine 10 milliGRAM(s) Oral every 8 hours  norepinephrine Infusion 1 MICROgram(s)/kG/Min (71.813 mL/Hr) IV Continuous <Continuous>  pantoprazole   Suspension 40 milliGRAM(s) Oral daily  propofol Infusion 5 MICROgram(s)/kG/Min (2.298 mL/Hr) IV Continuous <Continuous>  vasopressin Infusion 0.04 Unit(s)/Min (2.4 mL/Hr) IV Continuous <Continuous>    MEDICATIONS  (PRN):  acetaminophen   Tablet .. 650 milliGRAM(s) Oral every 6 hours PRN Temp greater or equal to 38C (100.4F), Mild Pain (1 - 3)  dextrose 40% Gel 15 Gram(s) Oral once PRN Blood Glucose LESS THAN 70 milliGRAM(s)/deciliter  glucagon  Injectable 1 milliGRAM(s) IntraMuscular once PRN Glucose LESS THAN 70 milligrams/deciliter      Daily     Daily Weight in k.7 (06 Oct 2018 05:30)    REVIEW OF SYSTEMS:  remains intubated, no bleeding ,  Vital Signs Last 24 Hrs  T(C): 37.8 (06 Oct 2018 03:30), Max: 38.2 (05 Oct 2018 19:22)  T(F): 100.1 (06 Oct 2018 03:30), Max: 100.7 (05 Oct 2018 19:22)  HR: 69 (06 Oct 2018 09:00) (52 - 142)  BP: 153/64 (06 Oct 2018 09:00) (80/56 - 153/64)  BP(mean): 77 (06 Oct 2018 09:00) (58 - 102)  RR: 21 (06 Oct 2018 09:00) (18 - 38)  SpO2: 100% (06 Oct 2018 06:00) (76% - 100%)    PHYSICAL EXAM: no bleeding   GENERAL: NAD, well-developed  HEAD:  Atraumatic, Normocephalic  EYES: clear , conjunctiva and sclera clear  NECK: Supple, No JVD  BREAST:   CHEST/LUNG: Clear   HEART: Regular rate and rhythm;  ABDOMEN: Soft, Nontender, Nondistended; Bowel sounds present  EXTREMITIES:  2+ Peripheral Pulses, No clubbing, cyanosis, or edema      LABS:  CBC Full  -  ( 06 Oct 2018 04:50 )  WBC Count : 45.97 K/uL  Hemoglobin : 7.5 g/dL  Hematocrit : 23.6 %  Platelet Count - Automated : 52 K/uL  Mean Cell Volume : 90.8 fl  Mean Cell Hemoglobin : 28.8 pg  Mean Cell Hemoglobin Concentration : 31.8 gm/dL  Auto Neutrophil # : x  Auto Lymphocyte # : x  Auto Monocyte # : x  Auto Eosinophil # : x  Auto Basophil # : x  Auto Neutrophil % : x  Auto Lymphocyte % : x  Auto Monocyte % : x  Auto Eosinophil % : x  Auto Basophil % : x    10-06    139  |  100  |  35<H>  ----------------------------<  134<H>  5.3   |  14<L>  |  3.73<H>    Ca    6.8<L>      06 Oct 2018 04:50  Phos  6.0     10-05  Mg     2.2     10-06    TPro  4.9<L>  /  Alb  2.4<L>  /  TBili  7.6<H>  /  DBili  6.61<H>  /  AST  2244<H>  /  ALT  906<H>  /  AlkPhos  403<H>  10-06    PT/INR - ( 06 Oct 2018 04:50 )   PT: 19.9 sec;   INR: 1.80 ratio         PTT - ( 06 Oct 2018 04:50 )  PTT:24.9 sec      CULTURES:    RADIOLOGY & ADDITIONAL STUDIES:

## 2018-10-07 LAB
GLUCOSE BLDC GLUCOMTR-MCNC: 45 MG/DL — CRITICAL LOW (ref 70–99)
GLUCOSE BLDC GLUCOMTR-MCNC: 45 MG/DL — CRITICAL LOW (ref 70–99)
GLUCOSE BLDC GLUCOMTR-MCNC: 53 MG/DL — LOW (ref 70–99)
GLUCOSE BLDC GLUCOMTR-MCNC: 95 MG/DL — SIGNIFICANT CHANGE UP (ref 70–99)

## 2018-10-08 DIAGNOSIS — I10 ESSENTIAL (PRIMARY) HYPERTENSION: ICD-10-CM

## 2018-10-08 DIAGNOSIS — R50.9 FEVER, UNSPECIFIED: ICD-10-CM

## 2018-10-08 DIAGNOSIS — E83.42 HYPOMAGNESEMIA: ICD-10-CM

## 2018-10-08 DIAGNOSIS — B17.9 ACUTE VIRAL HEPATITIS, UNSPECIFIED: ICD-10-CM

## 2018-10-08 DIAGNOSIS — Z92.3 PERSONAL HISTORY OF IRRADIATION: ICD-10-CM

## 2018-10-08 DIAGNOSIS — G03.8 MENINGITIS DUE TO OTHER SPECIFIED CAUSES: ICD-10-CM

## 2018-10-08 DIAGNOSIS — E87.2 ACIDOSIS: ICD-10-CM

## 2018-10-08 DIAGNOSIS — A39.4: ICD-10-CM

## 2018-10-08 DIAGNOSIS — Z92.21 PERSONAL HISTORY OF ANTINEOPLASTIC CHEMOTHERAPY: ICD-10-CM

## 2018-10-08 DIAGNOSIS — A41.9 SEPSIS, UNSPECIFIED ORGANISM: ICD-10-CM

## 2018-10-08 DIAGNOSIS — E83.51 HYPOCALCEMIA: ICD-10-CM

## 2018-10-08 DIAGNOSIS — Z87.891 PERSONAL HISTORY OF NICOTINE DEPENDENCE: ICD-10-CM

## 2018-10-08 DIAGNOSIS — R57.0 CARDIOGENIC SHOCK: ICD-10-CM

## 2018-10-08 DIAGNOSIS — I21.4 NON-ST ELEVATION (NSTEMI) MYOCARDIAL INFARCTION: ICD-10-CM

## 2018-10-08 DIAGNOSIS — K72.00 ACUTE AND SUBACUTE HEPATIC FAILURE WITHOUT COMA: ICD-10-CM

## 2018-10-08 DIAGNOSIS — Z85.3 PERSONAL HISTORY OF MALIGNANT NEOPLASM OF BREAST: ICD-10-CM

## 2018-10-08 DIAGNOSIS — E11.9 TYPE 2 DIABETES MELLITUS WITHOUT COMPLICATIONS: ICD-10-CM

## 2018-10-08 DIAGNOSIS — R65.21 SEVERE SEPSIS WITH SEPTIC SHOCK: ICD-10-CM

## 2018-10-08 DIAGNOSIS — E87.6 HYPOKALEMIA: ICD-10-CM

## 2018-10-08 DIAGNOSIS — M31.1 THROMBOTIC MICROANGIOPATHY: ICD-10-CM

## 2018-10-08 DIAGNOSIS — J96.01 ACUTE RESPIRATORY FAILURE WITH HYPOXIA: ICD-10-CM

## 2018-10-08 DIAGNOSIS — N17.0 ACUTE KIDNEY FAILURE WITH TUBULAR NECROSIS: ICD-10-CM

## 2018-10-08 DIAGNOSIS — D69.6 THROMBOCYTOPENIA, UNSPECIFIED: ICD-10-CM

## 2018-10-08 DIAGNOSIS — D68.9 COAGULATION DEFECT, UNSPECIFIED: ICD-10-CM

## 2018-10-08 LAB — GLUCOSE BLDC GLUCOMTR-MCNC: 53 MG/DL — LOW (ref 70–99)

## 2018-11-10 NOTE — PROCEDURE NOTE - NSSITEPREP_SKIN_A_CORE
intubated  dilated pupils R>L, nonreactive  breath sounds with bagging, equal b/l  abdomen distended  palpable pulses  nonresponsive  skin tears and ecchymoses b/l UE and LE and chest  cachetic appearing
Adherence to aseptic technique: hand hygiene prior to donning barriers (gown, gloves), don cap and mask, sterile drape over patient/chlorhexidine
chlorhexidine
chlorhexidine
Adherence to aseptic technique: hand hygiene prior to donning barriers (gown, gloves), don cap and mask, sterile drape over patient/chlorhexidine

## 2020-01-24 NOTE — PROVIDER CONTACT NOTE (CRITICAL VALUE NOTIFICATION) - PERSON GIVING RESULT:
Vannesa Grimes/ Lab Quality 226: Preventive Care And Screening: Tobacco Use: Screening And Cessation Intervention: Patient screened for tobacco use and is an ex/non-smoker Detail Level: Detailed Quality 130: Documentation Of Current Medications In The Medical Record: Current Medications Documented Quality 47: Advance Care Plan: Advance Care Planning discussed and documented; advance care plan or surrogate decision maker documented in the medical record. Quality 431: Preventive Care And Screening: Unhealthy Alcohol Use - Screening: Patient screened for unhealthy alcohol use using a single question and scores less than 2 times per year Quality 110: Preventive Care And Screening: Influenza Immunization: Influenza Immunization Administered during Influenza season

## 2022-10-10 NOTE — PROGRESS NOTE ADULT - REASON FOR ADMISSION
fever, malaise Other (Free Text): Lesion of concern to patient. Very benign appearing. Offered reassurance biopsy but pt is ok with monitoring. Note Text (......Xxx Chief Complaint.): This diagnosis correlates with the Detail Level: Detailed Render Risk Assessment In Note?: no